# Patient Record
Sex: FEMALE | Race: WHITE | NOT HISPANIC OR LATINO | Employment: OTHER | URBAN - METROPOLITAN AREA
[De-identification: names, ages, dates, MRNs, and addresses within clinical notes are randomized per-mention and may not be internally consistent; named-entity substitution may affect disease eponyms.]

---

## 2020-09-10 PROCEDURE — U0003 INFECTIOUS AGENT DETECTION BY NUCLEIC ACID (DNA OR RNA); SEVERE ACUTE RESPIRATORY SYNDROME CORONAVIRUS 2 (SARS-COV-2) (CORONAVIRUS DISEASE [COVID-19]), AMPLIFIED PROBE TECHNIQUE, MAKING USE OF HIGH THROUGHPUT TECHNOLOGIES AS DESCRIBED BY CMS-2020-01-R: HCPCS | Performed by: NURSE PRACTITIONER

## 2020-09-19 ENCOUNTER — APPOINTMENT (OUTPATIENT)
Dept: GENERAL RADIOLOGY | Facility: HOSPITAL | Age: 65
End: 2020-09-19

## 2020-09-19 ENCOUNTER — HOSPITAL ENCOUNTER (INPATIENT)
Facility: HOSPITAL | Age: 65
LOS: 5 days | Discharge: HOME | End: 2020-09-24
Attending: INTERNAL MEDICINE | Admitting: INTERNAL MEDICINE

## 2020-09-19 DIAGNOSIS — R50.9 FEVER IN ADULT: ICD-10-CM

## 2020-09-19 DIAGNOSIS — J44.9 CHRONIC OBSTRUCTIVE PULMONARY DISEASE, UNSPECIFIED COPD TYPE (HCC): ICD-10-CM

## 2020-09-19 DIAGNOSIS — J18.9 PNEUMONIA OF BOTH LUNGS DUE TO INFECTIOUS ORGANISM, UNSPECIFIED PART OF LUNG: ICD-10-CM

## 2020-09-19 DIAGNOSIS — R09.02 HYPOXIA: ICD-10-CM

## 2020-09-19 DIAGNOSIS — U07.1 COVID-19 VIRUS INFECTION: Primary | ICD-10-CM

## 2020-09-19 LAB
ALBUMIN SERPL-MCNC: 3.3 G/DL (ref 3.5–5.2)
ALBUMIN/GLOB SERPL: 1.3 G/DL
ALP SERPL-CCNC: 61 U/L (ref 39–117)
ALT SERPL W P-5'-P-CCNC: 45 U/L (ref 1–33)
ANION GAP SERPL CALCULATED.3IONS-SCNC: 11 MMOL/L (ref 5–15)
ARTERIAL PATENCY WRIST A: POSITIVE
AST SERPL-CCNC: 67 U/L (ref 1–32)
ATMOSPHERIC PRESS: ABNORMAL MM[HG]
B PARAPERT DNA SPEC QL NAA+PROBE: NOT DETECTED
B PERT DNA SPEC QL NAA+PROBE: NOT DETECTED
BASE EXCESS BLDA CALC-SCNC: 0.4 MMOL/L (ref 0–3)
BASOPHILS # BLD AUTO: 0 10*3/MM3 (ref 0–0.2)
BASOPHILS NFR BLD AUTO: 0.3 % (ref 0–1.5)
BDY SITE: ABNORMAL
BILIRUB SERPL-MCNC: 0.3 MG/DL (ref 0–1.2)
BUN SERPL-MCNC: 7 MG/DL (ref 8–23)
BUN SERPL-MCNC: ABNORMAL MG/DL
BUN/CREAT SERPL: ABNORMAL
C PNEUM DNA NPH QL NAA+NON-PROBE: NOT DETECTED
CALCIUM SPEC-SCNC: 8 MG/DL (ref 8.6–10.5)
CHLORIDE SERPL-SCNC: 97 MMOL/L (ref 98–107)
CO2 BLDA-SCNC: 24.2 MMOL/L (ref 22–29)
CO2 SERPL-SCNC: 25 MMOL/L (ref 22–29)
CREAT SERPL-MCNC: 0.57 MG/DL (ref 0.57–1)
CRP SERPL-MCNC: 11.34 MG/DL (ref 0–0.5)
D-LACTATE SERPL-SCNC: 1 MMOL/L (ref 0.5–2)
DEPRECATED RDW RBC AUTO: 45.1 FL (ref 37–54)
EOSINOPHIL # BLD AUTO: 0 10*3/MM3 (ref 0–0.4)
EOSINOPHIL NFR BLD AUTO: 0 % (ref 0.3–6.2)
ERYTHROCYTE [DISTWIDTH] IN BLOOD BY AUTOMATED COUNT: 14 % (ref 12.3–15.4)
FLUAV H1 2009 PAND RNA NPH QL NAA+PROBE: NOT DETECTED
FLUAV H1 HA GENE NPH QL NAA+PROBE: NOT DETECTED
FLUAV H3 RNA NPH QL NAA+PROBE: NOT DETECTED
FLUAV SUBTYP SPEC NAA+PROBE: NOT DETECTED
FLUBV RNA ISLT QL NAA+PROBE: NOT DETECTED
GFR SERPL CREATININE-BSD FRML MDRD: 107 ML/MIN/1.73
GLOBULIN UR ELPH-MCNC: 2.6 GM/DL
GLUCOSE BLDC GLUCOMTR-MCNC: 143 MG/DL (ref 70–105)
GLUCOSE BLDC GLUCOMTR-MCNC: 148 MG/DL (ref 70–105)
GLUCOSE SERPL-MCNC: 118 MG/DL (ref 65–99)
HADV DNA SPEC NAA+PROBE: NOT DETECTED
HCO3 BLDA-SCNC: 23.2 MMOL/L (ref 21–28)
HCOV 229E RNA SPEC QL NAA+PROBE: NOT DETECTED
HCOV HKU1 RNA SPEC QL NAA+PROBE: NOT DETECTED
HCOV NL63 RNA SPEC QL NAA+PROBE: NOT DETECTED
HCOV OC43 RNA SPEC QL NAA+PROBE: NOT DETECTED
HCT VFR BLD AUTO: 43.2 % (ref 34–46.6)
HEMODILUTION: NO
HGB BLD-MCNC: 14.7 G/DL (ref 12–15.9)
HMPV RNA NPH QL NAA+NON-PROBE: NOT DETECTED
HPIV1 RNA SPEC QL NAA+PROBE: NOT DETECTED
HPIV2 RNA SPEC QL NAA+PROBE: NOT DETECTED
HPIV3 RNA NPH QL NAA+PROBE: NOT DETECTED
HPIV4 P GENE NPH QL NAA+PROBE: NOT DETECTED
INHALED O2 CONCENTRATION: 40 %
LDH SERPL-CCNC: 434 U/L (ref 135–214)
LYMPHOCYTES # BLD AUTO: 0.6 10*3/MM3 (ref 0.7–3.1)
LYMPHOCYTES NFR BLD AUTO: 10.2 % (ref 19.6–45.3)
M PNEUMO IGG SER IA-ACNC: NOT DETECTED
MCH RBC QN AUTO: 31.5 PG (ref 26.6–33)
MCHC RBC AUTO-ENTMCNC: 34 G/DL (ref 31.5–35.7)
MCV RBC AUTO: 92.9 FL (ref 79–97)
MODALITY: ABNORMAL
MONOCYTES # BLD AUTO: 0.3 10*3/MM3 (ref 0.1–0.9)
MONOCYTES NFR BLD AUTO: 5.2 % (ref 5–12)
NEUTROPHILS NFR BLD AUTO: 5.2 10*3/MM3 (ref 1.7–7)
NEUTROPHILS NFR BLD AUTO: 84.3 % (ref 42.7–76)
NRBC BLD AUTO-RTO: 0 /100 WBC (ref 0–0.2)
PCO2 BLDA: 31.8 MM HG (ref 35–48)
PH BLDA: 7.47 PH UNITS (ref 7.35–7.45)
PLATELET # BLD AUTO: 204 10*3/MM3 (ref 140–450)
PMV BLD AUTO: 8.4 FL (ref 6–12)
PO2 BLDA: 76.9 MM HG (ref 83–108)
POTASSIUM SERPL-SCNC: 3.6 MMOL/L (ref 3.5–5.2)
PROCALCITONIN SERPL-MCNC: 0.55 NG/ML (ref 0–0.25)
PROT SERPL-MCNC: 5.9 G/DL (ref 6–8.5)
RBC # BLD AUTO: 4.65 10*6/MM3 (ref 3.77–5.28)
RHINOVIRUS RNA SPEC NAA+PROBE: NOT DETECTED
RSV RNA NPH QL NAA+NON-PROBE: NOT DETECTED
SAO2 % BLDCOA: 96.2 % (ref 94–98)
SODIUM SERPL-SCNC: 133 MMOL/L (ref 136–145)
TROPONIN T SERPL-MCNC: <0.01 NG/ML (ref 0–0.03)
WBC # BLD AUTO: 6.2 10*3/MM3 (ref 3.4–10.8)

## 2020-09-19 PROCEDURE — 94640 AIRWAY INHALATION TREATMENT: CPT

## 2020-09-19 PROCEDURE — 63710000001 ONDANSETRON ODT 4 MG TABLET DISPERSIBLE: Performed by: NURSE PRACTITIONER

## 2020-09-19 PROCEDURE — 25010000002 CEFTRIAXONE PER 250 MG: Performed by: NURSE PRACTITIONER

## 2020-09-19 PROCEDURE — 83605 ASSAY OF LACTIC ACID: CPT

## 2020-09-19 PROCEDURE — 25010000002 ENOXAPARIN PER 10 MG: Performed by: INTERNAL MEDICINE

## 2020-09-19 PROCEDURE — 87040 BLOOD CULTURE FOR BACTERIA: CPT | Performed by: NURSE PRACTITIONER

## 2020-09-19 PROCEDURE — 82803 BLOOD GASES ANY COMBINATION: CPT

## 2020-09-19 PROCEDURE — 83615 LACTATE (LD) (LDH) ENZYME: CPT | Performed by: NURSE PRACTITIONER

## 2020-09-19 PROCEDURE — 82962 GLUCOSE BLOOD TEST: CPT

## 2020-09-19 PROCEDURE — 94799 UNLISTED PULMONARY SVC/PX: CPT

## 2020-09-19 PROCEDURE — 84145 PROCALCITONIN (PCT): CPT | Performed by: NURSE PRACTITIONER

## 2020-09-19 PROCEDURE — 25010000002 DEXAMETHASONE SODIUM PHOSPHATE 10 MG/ML SOLUTION: Performed by: NURSE PRACTITIONER

## 2020-09-19 PROCEDURE — 99285 EMERGENCY DEPT VISIT HI MDM: CPT

## 2020-09-19 PROCEDURE — 71045 X-RAY EXAM CHEST 1 VIEW: CPT

## 2020-09-19 PROCEDURE — 86140 C-REACTIVE PROTEIN: CPT | Performed by: NURSE PRACTITIONER

## 2020-09-19 PROCEDURE — 84484 ASSAY OF TROPONIN QUANT: CPT | Performed by: NURSE PRACTITIONER

## 2020-09-19 PROCEDURE — 36600 WITHDRAWAL OF ARTERIAL BLOOD: CPT

## 2020-09-19 PROCEDURE — 0100U HC BIOFIRE FILMARRAY RESP PANEL 2: CPT | Performed by: INTERNAL MEDICINE

## 2020-09-19 PROCEDURE — 99223 1ST HOSP IP/OBS HIGH 75: CPT | Performed by: INTERNAL MEDICINE

## 2020-09-19 PROCEDURE — 85025 COMPLETE CBC W/AUTO DIFF WBC: CPT | Performed by: NURSE PRACTITIONER

## 2020-09-19 PROCEDURE — XW033E5 INTRODUCTION OF REMDESIVIR ANTI-INFECTIVE INTO PERIPHERAL VEIN, PERCUTANEOUS APPROACH, NEW TECHNOLOGY GROUP 5: ICD-10-PCS | Performed by: INTERNAL MEDICINE

## 2020-09-19 PROCEDURE — 93005 ELECTROCARDIOGRAM TRACING: CPT | Performed by: NURSE PRACTITIONER

## 2020-09-19 PROCEDURE — 25010000002 AZITHROMYCIN PER 500 MG: Performed by: NURSE PRACTITIONER

## 2020-09-19 PROCEDURE — 80053 COMPREHEN METABOLIC PANEL: CPT | Performed by: NURSE PRACTITIONER

## 2020-09-19 RX ORDER — NITROGLYCERIN 0.4 MG/1
0.4 TABLET SUBLINGUAL
Status: DISCONTINUED | OUTPATIENT
Start: 2020-09-19 | End: 2020-09-24 | Stop reason: HOSPADM

## 2020-09-19 RX ORDER — ACETAMINOPHEN 500 MG
1000 TABLET ORAL ONCE
Status: COMPLETED | OUTPATIENT
Start: 2020-09-19 | End: 2020-09-19

## 2020-09-19 RX ORDER — ONDANSETRON 2 MG/ML
4 INJECTION INTRAMUSCULAR; INTRAVENOUS EVERY 6 HOURS PRN
Status: DISCONTINUED | OUTPATIENT
Start: 2020-09-19 | End: 2020-09-24 | Stop reason: HOSPADM

## 2020-09-19 RX ORDER — ONDANSETRON 4 MG/1
4 TABLET, FILM COATED ORAL EVERY 6 HOURS PRN
Status: DISCONTINUED | OUTPATIENT
Start: 2020-09-19 | End: 2020-09-24 | Stop reason: HOSPADM

## 2020-09-19 RX ORDER — DEXAMETHASONE SODIUM PHOSPHATE 10 MG/ML
6 INJECTION, SOLUTION INTRAMUSCULAR; INTRAVENOUS ONCE
Status: COMPLETED | OUTPATIENT
Start: 2020-09-19 | End: 2020-09-19

## 2020-09-19 RX ORDER — ALUMINA, MAGNESIA, AND SIMETHICONE 2400; 2400; 240 MG/30ML; MG/30ML; MG/30ML
15 SUSPENSION ORAL EVERY 6 HOURS PRN
Status: DISCONTINUED | OUTPATIENT
Start: 2020-09-19 | End: 2020-09-24 | Stop reason: HOSPADM

## 2020-09-19 RX ORDER — SODIUM CHLORIDE 0.9 % (FLUSH) 0.9 %
10 SYRINGE (ML) INJECTION AS NEEDED
Status: DISCONTINUED | OUTPATIENT
Start: 2020-09-19 | End: 2020-09-24 | Stop reason: HOSPADM

## 2020-09-19 RX ORDER — CETIRIZINE HYDROCHLORIDE 10 MG/1
10 TABLET ORAL DAILY
Status: DISCONTINUED | OUTPATIENT
Start: 2020-09-19 | End: 2020-09-24 | Stop reason: HOSPADM

## 2020-09-19 RX ORDER — BUDESONIDE AND FORMOTEROL FUMARATE DIHYDRATE 160; 4.5 UG/1; UG/1
2 AEROSOL RESPIRATORY (INHALATION)
Status: DISCONTINUED | OUTPATIENT
Start: 2020-09-19 | End: 2020-09-24 | Stop reason: HOSPADM

## 2020-09-19 RX ORDER — ACETAMINOPHEN 160 MG/5ML
650 SOLUTION ORAL EVERY 4 HOURS PRN
Status: DISCONTINUED | OUTPATIENT
Start: 2020-09-19 | End: 2020-09-24 | Stop reason: HOSPADM

## 2020-09-19 RX ORDER — DEXAMETHASONE 6 MG/1
6 TABLET ORAL
Status: DISCONTINUED | OUTPATIENT
Start: 2020-09-20 | End: 2020-09-21

## 2020-09-19 RX ORDER — SODIUM CHLORIDE 0.9 % (FLUSH) 0.9 %
10 SYRINGE (ML) INJECTION EVERY 12 HOURS SCHEDULED
Status: DISCONTINUED | OUTPATIENT
Start: 2020-09-19 | End: 2020-09-24 | Stop reason: HOSPADM

## 2020-09-19 RX ORDER — AZITHROMYCIN 250 MG/1
250 TABLET, FILM COATED ORAL
Status: COMPLETED | OUTPATIENT
Start: 2020-09-20 | End: 2020-09-23

## 2020-09-19 RX ORDER — ACETAMINOPHEN 650 MG/1
650 SUPPOSITORY RECTAL EVERY 4 HOURS PRN
Status: DISCONTINUED | OUTPATIENT
Start: 2020-09-19 | End: 2020-09-24 | Stop reason: HOSPADM

## 2020-09-19 RX ORDER — ALBUTEROL SULFATE 2.5 MG/3ML
2.5 SOLUTION RESPIRATORY (INHALATION) EVERY 6 HOURS PRN
COMMUNITY

## 2020-09-19 RX ORDER — ONDANSETRON 4 MG/1
4 TABLET, ORALLY DISINTEGRATING ORAL ONCE
Status: COMPLETED | OUTPATIENT
Start: 2020-09-19 | End: 2020-09-19

## 2020-09-19 RX ORDER — ACETAMINOPHEN 325 MG/1
650 TABLET ORAL EVERY 4 HOURS PRN
Status: DISCONTINUED | OUTPATIENT
Start: 2020-09-19 | End: 2020-09-24 | Stop reason: HOSPADM

## 2020-09-19 RX ORDER — CHOLECALCIFEROL (VITAMIN D3) 125 MCG
5 CAPSULE ORAL NIGHTLY PRN
Status: DISCONTINUED | OUTPATIENT
Start: 2020-09-19 | End: 2020-09-24 | Stop reason: HOSPADM

## 2020-09-19 RX ORDER — ALBUTEROL SULFATE 90 UG/1
2 AEROSOL, METERED RESPIRATORY (INHALATION) EVERY 4 HOURS PRN
Status: DISCONTINUED | OUTPATIENT
Start: 2020-09-19 | End: 2020-09-24 | Stop reason: HOSPADM

## 2020-09-19 RX ADMIN — CEFTRIAXONE SODIUM 1 G: 10 INJECTION, POWDER, FOR SOLUTION INTRAVENOUS at 11:33

## 2020-09-19 RX ADMIN — ALBUTEROL SULFATE 2 PUFF: 90 AEROSOL, METERED RESPIRATORY (INHALATION) at 18:36

## 2020-09-19 RX ADMIN — Medication 10 ML: at 14:57

## 2020-09-19 RX ADMIN — ENOXAPARIN SODIUM 40 MG: 40 INJECTION SUBCUTANEOUS at 14:55

## 2020-09-19 RX ADMIN — SODIUM CHLORIDE 500 MG: 9 INJECTION, SOLUTION INTRAVENOUS at 11:33

## 2020-09-19 RX ADMIN — Medication 10 ML: at 20:30

## 2020-09-19 RX ADMIN — BUDESONIDE AND FORMOTEROL FUMARATE DIHYDRATE 2 PUFF: 160; 4.5 AEROSOL RESPIRATORY (INHALATION) at 18:36

## 2020-09-19 RX ADMIN — CETIRIZINE HYDROCHLORIDE 10 MG: 10 TABLET, FILM COATED ORAL at 14:55

## 2020-09-19 RX ADMIN — DEXAMETHASONE SODIUM PHOSPHATE 6 MG: 10 INJECTION, SOLUTION INTRAMUSCULAR; INTRAVENOUS at 09:37

## 2020-09-19 RX ADMIN — ONDANSETRON 4 MG: 4 TABLET, ORALLY DISINTEGRATING ORAL at 11:54

## 2020-09-19 RX ADMIN — SODIUM CHLORIDE 200 MG: 9 INJECTION, SOLUTION INTRAVENOUS at 15:12

## 2020-09-19 RX ADMIN — ACETAMINOPHEN 1000 MG: 500 TABLET ORAL at 09:39

## 2020-09-19 NOTE — ED NOTES
RT called for ABG per NP Andressa request.     Kacy Duarte RN  09/19/20 0941       Kacy Duarte RN  09/19/20 0941

## 2020-09-19 NOTE — ED NOTES
Bodyaches, fever at home. Was diagnosed with covid about 1 week ago. History of COPD. Coughs only after breathing treatments     Cari Kamara RN  09/19/20 1483

## 2020-09-19 NOTE — ED NOTES
Notified staff that patient needs a recollect on their green tube d/t hemolysis     Garima Champion, RegSched Rep  09/19/20 1007

## 2020-09-19 NOTE — ED PROVIDER NOTES
Subjective   Patient is a 65-year-old obese white female with history of COPD who presents today with complaints of fever chills body aches cough congestion shortness of breath.  Patient states she normally only wears oxygen at night however she has been wearing it continuously for the last several days.  Patient states her symptoms started 10 days ago.  She states at the onset of her symptoms she was seen in urgent care and tested for COVID.  She states 6 days ago she received positive results.  She states her  also had COVID and thinks she contracted it from him.  She states in the last 24 to 48 hours her symptoms have worsened.  She denies any chest pain.  She denies any nausea or vomiting but does report a few nonbloody loose stools.  She denies any lower extremity pain or edema.  She denies any recent travel or prolonged immobilization.          Review of Systems   Constitutional: Positive for chills, fatigue and fever.   HENT: Positive for congestion. Negative for sore throat.    Respiratory: Positive for cough and shortness of breath.    Cardiovascular: Negative for chest pain and leg swelling.   Gastrointestinal: Positive for diarrhea. Negative for abdominal pain, nausea and vomiting.   Genitourinary: Negative for decreased urine volume and dysuria.   Musculoskeletal: Positive for myalgias.   Skin: Negative for rash.   Neurological: Positive for weakness and headaches. Negative for dizziness and numbness.       Past Medical History:   Diagnosis Date   • COPD (chronic obstructive pulmonary disease) (CMS/[facility])        Allergies   Allergen Reactions   • Sulfa Antibiotics Anaphylaxis       Past Surgical History:   Procedure Laterality Date   • TONSILLECTOMY     • TUBAL ABDOMINAL LIGATION         No family history on file.    Social History     Socioeconomic History   • Marital status:      Spouse name: Not on file   • Number of children: Not on file   • Years of education: Not on file   • Highest  education level: Not on file   Tobacco Use   • Smoking status: Former Smoker   • Smokeless tobacco: Never Used   • Tobacco comment: quit 2011   Substance and Sexual Activity   • Alcohol use: Never     Frequency: Never   • Drug use: Never           Objective   Physical Exam  Vital signs and triage nurse note reviewed.  Constitutional: Awake, alert; well-developed and well-nourished. No acute distress is noted.  HEENT: Normocephalic, atraumatic; pupils are PERRL with intact EOM; oropharynx is pink and moist without exudate or erythema.  No drooling or pooling of oral secretions.  Neck: Supple, full range of motion without pain; no cervical lymphadenopathy. Normal phonation.  Cardiovascular: Regular rate and rhythm, normal S1-S2.  No murmur noted.  Pulmonary: Respiratory effort regular nonlabored, mildly tachypneic, breath sounds bibasilar crackles.  No wheezes or rhonchi noted.  Abdomen: Soft, nontender, nondistended with normoactive bowel sounds; no rebound or guarding.  Musculoskeletal: Independent range of motion of all extremities with no palpable tenderness or edema.  Calves are symmetric and nontender.  Neuro: Alert oriented x3, speech is clear and appropriate, GCS 15.    Skin: Flesh tone, warm, dry, intact; no erythematous or petechial rash or lesion.      Procedures           ED Course  ED Course as of Sep 19 1101   Sat Sep 19, 2020   1052 EKG reviewed by me and interpreted by Dr. Pollock:  Sinus tachycardia with ventricular rate of 110.  No acute ST or T wave changes noted.     [MD]      ED Course User Index  [MD] Steffany Mcmanus APRN      Labs Reviewed   CBC WITH AUTO DIFFERENTIAL - Abnormal; Notable for the following components:       Result Value    Neutrophil % 84.3 (*)     Lymphocyte % 10.2 (*)     Eosinophil % 0.0 (*)     Lymphocytes, Absolute 0.60 (*)     All other components within normal limits   BLOOD GAS, ARTERIAL - Abnormal; Notable for the following components:    pH, Arterial 7.471 (*)     pCO2,  Arterial 31.8 (*)     pO2, Arterial 76.9 (*)     All other components within normal limits   POC LACTATE - Normal   BLOOD CULTURE   BLOOD CULTURE   BLOOD GAS, ARTERIAL   COMPREHENSIVE METABOLIC PANEL   LACTATE DEHYDROGENASE   PROCALCITONIN   C-REACTIVE PROTEIN   TROPONIN (IN-HOUSE)   BUN   POC LACTATE   CBC AND DIFFERENTIAL    Narrative:     The following orders were created for panel order CBC & Differential.  Procedure                               Abnormality         Status                     ---------                               -----------         ------                     CBC Auto Differential[[id number]]        Abnormal            Final result                 Please view results for these tests on the individual orders.     Xr Chest Ap    Result Date: 9/19/2020  Multifocal consolidation involving the right midlung and lung bases bilaterally consistent with multifocal pneumonia, worsened from 09/10/2020.  Electronically Signed By-Ramiro Villa On:9/19/2020 10:30 AM This report was finalized on [id number] by  Ramiro Villa, .    Medications   sodium chloride 0.9 % flush 10 mL (has no administration in time range)   cefTRIAXone (ROCEPHIN) in SWFI 1 gram/10ml IV PUSH syringe (has no administration in time range)   azithromycin (ZITHROMAX) 500 mg in 250mL NS IVPB (has no administration in time range)   dexamethasone sodium phosphate injection 6 mg (6 mg Intravenous Given 9/19/20 0937)   acetaminophen (TYLENOL) tablet 1,000 mg (1,000 mg Oral Given 9/19/20 0939)                                            MDM  Number of Diagnoses or Management Options  COVID-19 virus infection:   Fever in adult:   Hypoxia:   Diagnosis management comments: Comorbidities: COPD  Differentials: Pneumonia, hypoxia, sepsis, COPD exacerbation;this list is not all inclusive and does not constitute the entirety of considered causes  Discussion with provider:  Radiology interpretation: X-rays reviewed by me and interpreted by radiologist:  As above  Lab interpretation: Labs viewed by me significant for: As above    Patient is pleasant continue cardiac monitor.  She had IV established.  She had labs, EKG and chest x-ray obtained.  Her initial room air O2 saturation was noted to be 85 to 86%.  She was placed on nasal cannula at 2 L with minimal improvement.  It was increased to 5 L.  Her O2 saturation improved to 92 to 93% on this.  She was given Decadron IV.  She had blood cultures obtained as well as POC lactates be normal at 1.0.  She was given Tylenol for fever.  She had ABG obtained was found to be as follows: pH 7.47, PCO2 31.8, PaO2 76.9, bicarb 23.2.  This was after the patient had been on 5 L nasal cannula for approximately 30 minutes.    Chest x-ray reveals multifocal pneumonia.  Patient started on IV Rocephin and Zithromax.    She will be admitted to hospital for further observation and treatment.  She was discussed with hospitalist.    Appropriate PPE was worn throughout patient exam/encounter.       Amount and/or Complexity of Data Reviewed  Clinical lab tests: ordered and reviewed  Tests in the radiology section of CPT®: ordered and reviewed    Patient Progress  Patient progress: stable      Final diagnoses:   COVID-19 virus infection   Hypoxia   Fever in adult   Pneumonia of both lungs due to infectious organism, unspecified part of lung            Steffany Mcmanus APRN  09/19/20 1057       Steffany Mcmanus APRN  09/19/20 1101

## 2020-09-19 NOTE — NURSING NOTE
Received patient from ED around 13:00 today.  Presents with SOB on 5 liters non-rebreather.  Was febrile in Ed, but upon admission was 97.5.  No c/o of pain or distress.  Pt received Remdesivir at 15:00 through her 20G RAC.  Pt is A&O x4 and a stand by assist.  Pt ate 25% of her dinner and stated that she is feeling much better than she did when she came in.

## 2020-09-19 NOTE — H&P
[facility] Medicine Services      Patient Name: Monisha Batista  : 1955  MRN: [id number]  Primary Care Physician: Polly Domingo MD  Date of admission: 2020    Patient Care Team:  Polly Domingo MD as PCP - General (Family Medicine)  Bess Serrato MD as PCP - Claims Attributed  Bess Serrato MD as Consulting Physician (Pulmonary Disease)          Subjective   History Present Illness     Chief Complaint:   Chief Complaint   Patient presents with   • Fever       Shortness of breath         History of Present Illness    Patient is a 65-year-old female with history of COPD on home oxygen 2.5 L at bedtime.  Presented to the emergency room on 2020 with complaints of cough productive of brownish sputum, progressively worsening shortness of breath and body aches.  Patient stated symptoms started about 10 days ago  and she had gone to an outlying urgent care facility where she was diagnosed with COVID-19 on 2020.  Since then her symptoms continue to worsen and she now has to use her home oxygen continuously.  Related having subjective fever and chills  Of note is that her  is also positive for COVID-19.  In the ED, temperature was 101.6 and patient 's oxygen saturation was 86% on room air improved to 93% with 5 L of oxygen via nasal cannula.  Chest x-ray done showed worsening multifocal pneumonia.    Patient was admitted for further care.    Review of Systems   Constitution: Positive for chills, fever and malaise/fatigue.   HENT: Positive for congestion.    Eyes: Negative for blurred vision.   Cardiovascular: Negative for chest pain.   Respiratory: Positive for cough and shortness of breath. Negative for hemoptysis.    Endocrine: Negative for cold intolerance and heat intolerance.   Gastrointestinal: Positive for diarrhea and nausea. Negative for abdominal pain and vomiting.   Genitourinary: Negative for dysuria.   Neurological: Positive for weakness.      Psychiatric/Behavioral: Negative for altered mental status.           Personal History     Past Medical History:   Past Medical History:   Diagnosis Date   • COPD (chronic obstructive pulmonary disease) (CMS/HCC)    • Sleep apnea        Surgical History:      Past Surgical History:   Procedure Laterality Date   • TONSILLECTOMY     • TUBAL ABDOMINAL LIGATION             Family History: family history is not on file. Otherwise pertinent FHx was reviewed and unremarkable.     Social History:  reports that she has quit smoking. She has never used smokeless tobacco. She reports that she does not drink alcohol or use drugs.      Medications:  Prior to Admission medications    Medication Sig Start Date End Date Taking? Authorizing Provider   albuterol (PROVENTIL) (2.5 MG/3ML) 0.083% nebulizer solution Take 2.5 mg by nebulization Every 6 (Six) Hours As Needed for Wheezing or Shortness of Air.   Yes Provider, MD Angel   albuterol sulfate  (90 Base) MCG/ACT inhaler Inhale 2 puffs Every 4 (Four) Hours As Needed. 7/7/20  Yes Emergency, Nurse Harris RN   loratadine (CLARITIN) 10 MG tablet Take 10 mg by mouth every night at bedtime. 7/6/20  Yes Emergency, Nurse Epic, RN   TRELEGY ELLIPTA 100-62.5-25 MCG/INH aerosol powder  Inhale 1 puff Daily. 8/12/20  Yes Emergency, Nurse Epic, RN   brompheniramine-pseudoephedrine-DM 30-2-10 MG/5ML syrup Take 5 mL by mouth 4 (Four) Times a Day As Needed for Congestion or Cough. 9/10/20   Taj Belcher APRN   doxycycline (VIBRAMYCIN) 100 MG capsule Take 1 capsule by mouth 2 (Two) Times a Day. 9/10/20   Taj Belcher APRN   albuterol sulfate HFA (ProAir HFA) 108 (90 Base) MCG/ACT inhaler PROAIR  (90 Base) MCG/ACT AERS 2/18/16 9/19/20  Emergency, Nurse Epic, RN       Allergies:    Allergies   Allergen Reactions   • Sulfa Antibiotics Anaphylaxis       Objective   Objective     Vital Signs  Temp:  [97.8 °F (36.6 °C)-101.6 °F (38.7 °C)] 97.8 °F (36.6 °C)  Heart Rate:   [101-119] 101  Resp:  [16-22] 22  BP: (101-146)/(68-85) 123/74  SpO2:  [86 %-95 %] 92 %  on  Flow (L/min):  [5] 5;   Device (Oxygen Therapy): nonrebreather mask  Body mass index is 38.03 kg/m².    Physical Exam  Vitals signs reviewed.   Constitutional:       General: She is not in acute distress.  HENT:      Head: Normocephalic and atraumatic.      Nose: Nose normal.   Eyes:      Extraocular Movements: Extraocular movements intact.      Pupils: Pupils are equal, round, and reactive to light.   Neck:      Musculoskeletal: Neck supple.   Cardiovascular:      Rate and Rhythm: Normal rate and regular rhythm.   Pulmonary:      Effort: No respiratory distress.      Comments: Decreased air entry at the bases  Abdominal:      General: Bowel sounds are normal.      Palpations: Abdomen is soft.      Tenderness: There is no abdominal tenderness.   Musculoskeletal: Normal range of motion.   Skin:     General: Skin is warm and dry.   Neurological:      General: No focal deficit present.      Mental Status: She is alert and oriented to person, place, and time.   Psychiatric:         Mood and Affect: Mood normal.         Results Review:  I have personally reviewed most recent blood work and imaging     Results from last 7 days   Lab Units 09/19/20  0937   WBC 10*3/mm3 6.20   HEMOGLOBIN g/dL 14.7   HEMATOCRIT % 43.2   PLATELETS 10*3/mm3 204     Results from last 7 days   Lab Units 09/19/20  1009 09/19/20  0935   SODIUM mmol/L 133*  --    POTASSIUM mmol/L 3.6  --    CHLORIDE mmol/L 97*  --    CO2 mmol/L 25.0  --    BUN  7*  --    CREATININE mg/dL 0.57  --    GLUCOSE mg/dL 118*  --    CALCIUM mg/dL 8.0*  --    ALT (SGPT) U/L 45*  --    AST (SGOT) U/L 67*  --    TROPONIN T ng/mL <0.010  --    LACTATE mmol/L  --  1.0   PROCALCITONIN ng/mL 0.55*  --      Estimated Creatinine Clearance: 115.2 mL/min (by C-G formula based on SCr of 0.57 mg/dL).  Brief Urine Lab Results     None          Microbiology Results (last 10 days)     Procedure  Component Value - Date/Time    COVID-19,[facility],NP/OP Swab in Transport Media or ESwab 72 HR TAT - Swab, Nasopharynx [[id number]]  (Abnormal) Collected: 09/10/20 1313    Lab Status: Final result Specimen: Swab from Nasopharynx Updated: 09/12/20 2207    Narrative:      The following orders were created for panel order COVID-19,[facility],NP/OP Swab in Transport Media or ESwab 72 HR TAT - Swab, Nasopharynx.  Procedure                               Abnormality         Status                     ---------                               -----------         ------                     COVID-19,[facility] ROUTINE...[[id number]]  Abnormal            Final result                 Please view results for these tests on the individual orders.    COVID-19,[facility] ROUTINE, NP/OP SWAB IN TRANSPORT MEDIA OR ESWAB 72 HR TAT - Swab, Nasopharynx [[id number]]  (Abnormal) Collected: 09/10/20 1313    Lab Status: Final result Specimen: Swab from Nasopharynx Updated: 09/12/20 2207     SARS-CoV-2, [facility]ILYN Detected     Comment: This nucleic acid amplification test was developed and its performance  characteristics determined by [facility]. Nucleic acid  amplification tests include PCR and TMA. This test has not been FDA  cleared or approved. This test has been authorized by FDA under an  Emergency Use Authorization (EUA). This test is only authorized for  the duration of time the declaration that circumstances exist  justifying the authorization of the emergency use of in vitro  diagnostic tests for detection of SARS-CoV-2 virus and/or diagnosis  of COVID-19 infection under section 564(b)(1) of the Act, 21 U.S.C.  360bbb-3(b) (1), unless the authorization is terminated or revoked  sooner.  When diagnostic testing is negative, the possibility of a false  negative result should be considered in the context of a patient's  recent exposures and the presence of clinical signs and symptoms  consistent with COVID-19. An individual without symptoms of  COVID-19  and who is not shedding SARS-CoV-2 virus would expect to have a  negative (not detected) result in this assay.       Narrative:      Performed at:  [address] [facility]  [address] [facility], [place], [place]  [id number]  : Eliza Rose MD, Phone:  [phone]          ECG/EMG Results (most recent)     Procedure Component Value Units Date/Time    ECG 12 Lead [[id number]] Collected: 09/19/20 0944     Updated: 09/19/20 0946    Narrative:      HEART RATE= 110  bpm  RR Interval= 544  ms  NC Interval= 158  ms  P Horizontal Axis= 0  deg  P Front Axis= 61  deg  QRSD Interval= 84  ms  QT Interval= 319  ms  QRS Axis= 5  deg  T Wave Axis= 62  deg  - OTHERWISE NORMAL ECG -  Sinus tachycardia  No previous ECG available for comparison  Electronically Signed By:   Date and Time of Study: 2020-09-19 09:44:09                    Xr Chest Ap    Result Date: 9/19/2020  Multifocal consolidation involving the right midlung and lung bases bilaterally consistent with multifocal pneumonia, worsened from 09/10/2020.  Electronically Signed By-Ramiro Villa On:9/19/2020 10:30 AM This report was finalized on [id number] by  Ramiro Villa, .        Estimated Creatinine Clearance: 115.2 mL/min (by C-G formula based on SCr of 0.57 mg/dL).    Assessment/Plan   Assessment/Plan       Active Hospital Problems    Diagnosis  POA   • COVID-19 virus infection [U07.1]  Yes      Resolved Hospital Problems   No resolved problems to display.     Multifocal pneumonia due to COVID-19 infection  Tested positive for COVID-19 on 09/10/2020  Will  check respiratory viral panel  Chest x-ray reviewed-showed worsening of the focal pneumonia  Will start on Decadron and remdesivir   Also started on azithromycin  Respiratory care with bronchodilator  Oxygen therapy and titration  Airborne and contact precaution initiated  Monitor inflammatory markers    Fever-most likely due to above  Analgesic as needed    Acute hypoxic respiratory failure on  chronic respiratory failure  On oxygen via nasal cannula-currently on 5 L  Oxygen therapy and titration      DVT prophylaxis with Lovenox        Further recommendation following clinical course    The FDA has authorized the emergency use of remdesivir, which is not an FDA approved drug. Discussions with the patient/patient caregiver regarding the risks and benefits of remdesivir have occurred. The patient/patient caregiver verbalizes recognition that this is an investigational medication which may offer both benefits and risks, the extent of which are unknown. Information on available alternative treatments and the risks and benefits of those alternatives was discussed. “Fact Sheet for Patients and Parents/Caregivers” was discussed with the patient/patient caregiver. All questions were answered to satisfaction and the patient/patient caregiver would like to proceed treating with remdesivir.                Mechanical Order History:     None      Pharmalogical Order History:     None          CODE STATUS:    Code Status and Medical Interventions:   Ordered at: 09/19/20 1358     Level Of Support Discussed With:    Patient     Code Status:    CPR     Medical Interventions (Level of Support Prior to Arrest):    Full       This patient has been examined with appropriate PPE. 09/19/20      I discussed the patient's findings and my recommendations with patient's    Electronically signed by Alex Leonard MD, 09/19/20, 1:27 PM EDT.  [facility] Hospitalist Team

## 2020-09-20 LAB
ALBUMIN SERPL-MCNC: 3.1 G/DL (ref 3.5–5.2)
ALBUMIN/GLOB SERPL: 1.1 G/DL
ALP SERPL-CCNC: 64 U/L (ref 39–117)
ALT SERPL W P-5'-P-CCNC: 45 U/L (ref 1–33)
ANION GAP SERPL CALCULATED.3IONS-SCNC: 10 MMOL/L (ref 5–15)
AST SERPL-CCNC: 63 U/L (ref 1–32)
BASOPHILS # BLD AUTO: 0 10*3/MM3 (ref 0–0.2)
BASOPHILS NFR BLD AUTO: 0.2 % (ref 0–1.5)
BILIRUB SERPL-MCNC: 0.2 MG/DL (ref 0–1.2)
BUN SERPL-MCNC: 10 MG/DL (ref 8–23)
BUN SERPL-MCNC: ABNORMAL MG/DL
BUN/CREAT SERPL: ABNORMAL
CALCIUM SPEC-SCNC: 7.9 MG/DL (ref 8.6–10.5)
CHLORIDE SERPL-SCNC: 102 MMOL/L (ref 98–107)
CO2 SERPL-SCNC: 26 MMOL/L (ref 22–29)
CREAT SERPL-MCNC: 0.53 MG/DL (ref 0.57–1)
D DIMER PPP FEU-MCNC: 0.53 MG/L (FEU) (ref 0–0.59)
DEPRECATED RDW RBC AUTO: 45.1 FL (ref 37–54)
EOSINOPHIL # BLD AUTO: 0 10*3/MM3 (ref 0–0.4)
EOSINOPHIL NFR BLD AUTO: 0 % (ref 0.3–6.2)
ERYTHROCYTE [DISTWIDTH] IN BLOOD BY AUTOMATED COUNT: 13.7 % (ref 12.3–15.4)
FERRITIN SERPL-MCNC: 1797 NG/ML (ref 13–150)
GFR SERPL CREATININE-BSD FRML MDRD: 116 ML/MIN/1.73
GLOBULIN UR ELPH-MCNC: 2.7 GM/DL
GLUCOSE BLDC GLUCOMTR-MCNC: 120 MG/DL (ref 70–105)
GLUCOSE BLDC GLUCOMTR-MCNC: 146 MG/DL (ref 70–105)
GLUCOSE BLDC GLUCOMTR-MCNC: 170 MG/DL (ref 70–105)
GLUCOSE BLDC GLUCOMTR-MCNC: 173 MG/DL (ref 70–105)
GLUCOSE SERPL-MCNC: 129 MG/DL (ref 65–99)
HCT VFR BLD AUTO: 41.2 % (ref 34–46.6)
HGB BLD-MCNC: 14 G/DL (ref 12–15.9)
LYMPHOCYTES # BLD AUTO: 0.7 10*3/MM3 (ref 0.7–3.1)
LYMPHOCYTES NFR BLD AUTO: 18.6 % (ref 19.6–45.3)
MCH RBC QN AUTO: 31.5 PG (ref 26.6–33)
MCHC RBC AUTO-ENTMCNC: 33.9 G/DL (ref 31.5–35.7)
MCV RBC AUTO: 93 FL (ref 79–97)
MONOCYTES # BLD AUTO: 0.4 10*3/MM3 (ref 0.1–0.9)
MONOCYTES NFR BLD AUTO: 10.8 % (ref 5–12)
NEUTROPHILS NFR BLD AUTO: 2.5 10*3/MM3 (ref 1.7–7)
NEUTROPHILS NFR BLD AUTO: 70.4 % (ref 42.7–76)
NRBC BLD AUTO-RTO: 0.1 /100 WBC (ref 0–0.2)
PLATELET # BLD AUTO: 207 10*3/MM3 (ref 140–450)
PMV BLD AUTO: 8 FL (ref 6–12)
POTASSIUM SERPL-SCNC: 3.9 MMOL/L (ref 3.5–5.2)
PROT SERPL-MCNC: 5.8 G/DL (ref 6–8.5)
RBC # BLD AUTO: 4.43 10*6/MM3 (ref 3.77–5.28)
SODIUM SERPL-SCNC: 138 MMOL/L (ref 136–145)
WBC # BLD AUTO: 3.5 10*3/MM3 (ref 3.4–10.8)

## 2020-09-20 PROCEDURE — 94799 UNLISTED PULMONARY SVC/PX: CPT

## 2020-09-20 PROCEDURE — 80053 COMPREHEN METABOLIC PANEL: CPT | Performed by: INTERNAL MEDICINE

## 2020-09-20 PROCEDURE — 82962 GLUCOSE BLOOD TEST: CPT

## 2020-09-20 PROCEDURE — 99233 SBSQ HOSP IP/OBS HIGH 50: CPT | Performed by: INTERNAL MEDICINE

## 2020-09-20 PROCEDURE — 82728 ASSAY OF FERRITIN: CPT | Performed by: INTERNAL MEDICINE

## 2020-09-20 PROCEDURE — 85025 COMPLETE CBC W/AUTO DIFF WBC: CPT | Performed by: INTERNAL MEDICINE

## 2020-09-20 PROCEDURE — 85379 FIBRIN DEGRADATION QUANT: CPT | Performed by: INTERNAL MEDICINE

## 2020-09-20 PROCEDURE — 25010000002 ENOXAPARIN PER 10 MG: Performed by: INTERNAL MEDICINE

## 2020-09-20 RX ADMIN — SODIUM CHLORIDE 100 MG: 9 INJECTION, SOLUTION INTRAVENOUS at 15:11

## 2020-09-20 RX ADMIN — ENOXAPARIN SODIUM 40 MG: 40 INJECTION SUBCUTANEOUS at 15:11

## 2020-09-20 RX ADMIN — BUDESONIDE AND FORMOTEROL FUMARATE DIHYDRATE 2 PUFF: 160; 4.5 AEROSOL RESPIRATORY (INHALATION) at 07:52

## 2020-09-20 RX ADMIN — BUDESONIDE AND FORMOTEROL FUMARATE DIHYDRATE 2 PUFF: 160; 4.5 AEROSOL RESPIRATORY (INHALATION) at 19:10

## 2020-09-20 RX ADMIN — Medication 10 ML: at 20:00

## 2020-09-20 RX ADMIN — CETIRIZINE HYDROCHLORIDE 10 MG: 10 TABLET, FILM COATED ORAL at 08:00

## 2020-09-20 RX ADMIN — DEXAMETHASONE 6 MG: 6 TABLET ORAL at 08:00

## 2020-09-20 RX ADMIN — AZITHROMYCIN MONOHYDRATE 250 MG: 250 TABLET ORAL at 08:01

## 2020-09-20 RX ADMIN — Medication 10 ML: at 08:00

## 2020-09-20 NOTE — NURSING NOTE
Patient in bed resting. Non- rebreather on. Patient denies pain. States she experienced one episode of diarrhea today(1900). Per patient one BM today. VS WNL. Patient denies nausea. Productive cough noted. Call light within reach. Bedside table with reach. Educated on use of call light for assistance. Bedside commode noted. Will continue to monitor.

## 2020-09-20 NOTE — PROGRESS NOTES
[facility] Medicine Services Daily Progress Note      Hospitalist Team  LOS 1 days      Patient Care Team:  Polly Domingo MD as PCP - General (Family Medicine)  Bess Serrato MD as PCP - Claims Attributed  Bess Serrato MD as Consulting Physician (Pulmonary Disease)    Patient Location: 201/1      Subjective   Subjective     Chief Complaint / Subjective  Chief Complaint   Patient presents with   • Fever         Brief Synopsis of Hospital Course/HPI  Patient is a 65-year-old female with history of COPD on home oxygen 2.5 L at bedtime.  Presented to the emergency room on 9/20/2020 with complaints of cough productive of brownish sputum, progressively worsening shortness of breath and body aches.  Patient stated symptoms started about 10 days ago  and she had gone to an outlying urgent care facility where she was diagnosed with COVID-19 on 9/11/2020.  Since then her symptoms continue to worsen and she now has to use her home oxygen continuously.  Related having subjective fever and chills  Of note is that her  is also positive for COVID-19.  In the ED, temperature was 101.6 and patient 's oxygen saturation was 86% on room air improved to 93% with 5 L of oxygen via nasal cannula.  Chest x-ray done showed worsening multifocal pneumonia.     Patient was admitted for further care.      Date::    9/20/2020  Patient seen and examined  Shortness of breath continues  Currently on nonrebreather mask 11 L and saturating mid 90s    Review of Systems   Constitution: Negative for chills and fever.   HENT: Negative for congestion.    Eyes: Negative for blurred vision.   Cardiovascular: Negative for chest pain.   Respiratory: Positive for cough and shortness of breath.    Endocrine: Negative for cold intolerance and heat intolerance.   Gastrointestinal: Negative for abdominal pain, nausea and vomiting.   Genitourinary: Negative for dysuria.   Neurological: Positive for weakness.    "  Psychiatric/Behavioral: Negative for altered mental status.         Objective   Objective      Vital Signs  Temp:  [96 °F (35.6 °C)-101.6 °F (38.7 °C)] 97.3 °F (36.3 °C)  Heart Rate:  [] 88  Resp:  [17-25] 20  BP: (101-146)/(64-81) 118/66  Oxygen Therapy  SpO2: 93 %  Pulse Oximetry Type: Continuous  Device (Oxygen Therapy): nonrebreather mask  Flow (L/min): 11  Flowsheet Rows      First Filed Value   Admission Height  162.6 cm (64\") Documented at 09/19/2020 0912   Admission Weight  101 kg (221 lb 9 oz) Documented at 09/19/2020 0912        Intake & Output (last 3 days)       09/17 0701 - 09/18 0700 09/18 0701 - 09/19 0700 09/19 0701 - 09/20 0700 09/20 0701 - 09/21 0700    P.O.   120 240    Total Intake(mL/kg)   120 (1.2) 240 (2.4)    Net   +120 +240            Urine Unmeasured Occurrence    2 x    Stool Unmeasured Occurrence   1 x         Lines, Drains & Airways    Active LDAs     Name:   Placement date:   Placement time:   Site:   Days:    Peripheral IV 09/19/20 0939 Right Antecubital   09/19/20    0939    Antecubital   1                  Physical Exam:    Physical Exam  Vitals signs reviewed.   Constitutional:       Appearance: She is ill-appearing.   HENT:      Head: Normocephalic and atraumatic.      Nose: Nose normal.   Eyes:      Extraocular Movements: Extraocular movements intact.      Conjunctiva/sclera: Conjunctivae normal.      Pupils: Pupils are equal, round, and reactive to light.   Neck:      Musculoskeletal: Neck supple.   Cardiovascular:      Rate and Rhythm: Normal rate and regular rhythm.   Pulmonary:      Effort: Pulmonary effort is normal.      Comments: Decreased air entry at the bases  Abdominal:      General: Bowel sounds are normal.      Palpations: Abdomen is soft.      Tenderness: There is no abdominal tenderness.   Musculoskeletal: Normal range of motion.   Skin:     General: Skin is warm and dry.   Neurological:      Mental Status: She is alert and oriented to person, place, and " time.   Psychiatric:         Mood and Affect: Mood normal.               Procedures:              Results Review:     I reviewed the patient's new clinical results.      Lab Results (last 24 hours)     Procedure Component Value Units Date/Time    Blood Culture - Blood, Arm, Right [[id number]] Collected: 09/19/20 0937    Specimen: Blood from Arm, Right Updated: 09/20/20 0945     Blood Culture No growth at 24 hours    BUN [[id number]]  (Normal) Collected: 09/20/20 0322    Specimen: Blood Updated: 09/20/20 0711     BUN 10 mg/dL     POC Glucose Once [[id number]]  (Abnormal) Collected: 09/20/20 0701    Specimen: Blood Updated: 09/20/20 0702     Glucose 120 mg/dL      Comment: Serial Number: [id number]:  [id number]       Ferritin [[id number]]  (Abnormal) Collected: 09/20/20 0322    Specimen: Blood Updated: 09/20/20 0446     Ferritin 1,797.00 ng/mL     Narrative:      Results may be falsely decreased if patient taking Biotin.      Comprehensive Metabolic Panel [[id number]]  (Abnormal) Collected: 09/20/20 0322    Specimen: Blood Updated: 09/20/20 0442     Glucose 129 mg/dL      BUN --     Comment: Testing performed by alternate method        Creatinine 0.53 mg/dL      Sodium 138 mmol/L      Potassium 3.9 mmol/L      Chloride 102 mmol/L      CO2 26.0 mmol/L      Calcium 7.9 mg/dL      Total Protein 5.8 g/dL      Albumin 3.10 g/dL      ALT (SGPT) 45 U/L      AST (SGOT) 63 U/L      Alkaline Phosphatase 64 U/L      Total Bilirubin 0.2 mg/dL      eGFR Non African Amer 116 mL/min/1.73      Globulin 2.7 gm/dL      A/G Ratio 1.1 g/dL      BUN/Creatinine Ratio --     Comment: Testing not performed        Anion Gap 10.0 mmol/L     Narrative:      GFR Normal >60  Chronic Kidney Disease <60  Kidney Failure <15      D-dimer, Quantitative [[id number]]  (Normal) Collected: 09/20/20 0322    Specimen: Blood Updated: 09/20/20 0424     D-Dimer, Quantitative 0.53 mg/L (FEU)     Narrative:      Reference  Range  --------------------------------------------------------------------     < 0.50   Negative Predictive Value  0.50-0.59   Indeterminate    >= 0.60   Probable VTE             A very low percentage of patients with DVT may yield D-Dimer results   below the cut-off of 0.50 mg/L FEU.  This is known to be more   prevalent in patients with distal DVT.             Results of this test should always be interpreted in conjunction with   the patient's medical history, clinical presentation and other   findings.  Clinical diagnosis should not be based on the result of   INNOVANCE D-Dimer alone.    CBC Auto Differential [[id number]]  (Abnormal) Collected: 09/20/20 0322    Specimen: Blood Updated: 09/20/20 0414     WBC 3.50 10*3/mm3      RBC 4.43 10*6/mm3      Hemoglobin 14.0 g/dL      Hematocrit 41.2 %      MCV 93.0 fL      MCH 31.5 pg      MCHC 33.9 g/dL      RDW 13.7 %      RDW-SD 45.1 fl      MPV 8.0 fL      Platelets 207 10*3/mm3      Neutrophil % 70.4 %      Lymphocyte % 18.6 %      Monocyte % 10.8 %      Eosinophil % 0.0 %      Basophil % 0.2 %      Neutrophils, Absolute 2.50 10*3/mm3      Lymphocytes, Absolute 0.70 10*3/mm3      Monocytes, Absolute 0.40 10*3/mm3      Eosinophils, Absolute 0.00 10*3/mm3      Basophils, Absolute 0.00 10*3/mm3      nRBC 0.1 /100 WBC     POC Glucose Once [[id number]]  (Abnormal) Collected: 09/19/20 2142    Specimen: Blood Updated: 09/19/20 2143     Glucose 148 mg/dL      Comment: Serial Number: [id number]:  [id number]       Respiratory Panel, PCR - Swab, Nasopharynx [[id number]]  (Normal) Collected: 09/19/20 1504    Specimen: Swab from Nasopharynx Updated: 09/19/20 1651     ADENOVIRUS, PCR Not Detected     Coronavirus 229E Not Detected     Coronavirus HKU1 Not Detected     Coronavirus NL63 Not Detected     Coronavirus OC43 Not Detected     Human Metapneumovirus Not Detected     Human Rhinovirus/Enterovirus Not Detected     Influenza B PCR Not Detected     Parainfluenza Virus 1 Not  "Detected     Parainfluenza Virus 2 Not Detected     Parainfluenza Virus 3 Not Detected     Parainfluenza Virus 4 Not Detected     Bordetella pertussis pcr Not Detected     Influenza A H1 2009 PCR Not Detected     Chlamydophila pneumoniae PCR Not Detected     Mycoplasma pneumo by PCR Not Detected     Influenza A PCR Not Detected     Influenza A H3 Not Detected     Influenza A H1 Not Detected     RSV, PCR Not Detected     Bordetella parapertussis PCR Not Detected    Narrative:      The coronavirus on the RVP is NOT COVID-19 and is NOT indicative of infection with COVID-19.     POC Glucose Once [[id number]]  (Abnormal) Collected: 09/19/20 1559    Specimen: Blood Updated: 09/19/20 1600     Glucose 143 mg/dL      Comment: Serial Number: [id number]:  [id number]       BUN [[id number]]  (Abnormal) Collected: 09/19/20 1009    Specimen: Blood Updated: 09/19/20 1115     BUN 7 mg/dL     Procalcitonin [[id number]]  (Abnormal) Collected: 09/19/20 1009    Specimen: Blood Updated: 09/19/20 1106     Procalcitonin 0.55 ng/mL     Narrative:      As a Marker for Sepsis (Non-Neonates):   1. <0.5 ng/mL represents a low risk of severe sepsis and/or septic shock.  1. >2 ng/mL represents a high risk of severe sepsis and/or septic shock.    As a Marker for Lower Respiratory Tract Infections that require antibiotic therapy:  PCT on Admission     Antibiotic Therapy             6-12 Hrs later  > 0.5                Strongly Recommended            >0.25 - <0.5         Recommended  0.1 - 0.25           Discouraged                   Remeasure/reassess PCT  <0.1                 Strongly Discouraged          Remeasure/reassess PCT      As 28 day mortality risk marker: \"Change in Procalcitonin Result\" (> 80 % or <=80 %) if Day 0 (or Day 1) and Day 4 values are available. Refer to [email or web address]-[email or web address]   Change in PCT <=80 %   A decrease of PCT levels below or equal to 80 % defines a positive change in PCT test result representing a " higher risk for 28-day all-cause mortality of patients diagnosed with severe sepsis or septic shock.  Change in PCT > 80 %   A decrease of PCT levels of more than 80 % defines a negative change in PCT result representing a lower risk for 28-day all-cause mortality of patients diagnosed with severe sepsis or septic shock.                Results may be falsely decreased if patient taking Biotin.     Comprehensive Metabolic Panel [[id number]]  (Abnormal) Collected: 09/19/20 1009    Specimen: Blood Updated: 09/19/20 1104     Glucose 118 mg/dL      BUN --     Comment: Testing performed by alternate method        Creatinine 0.57 mg/dL      Sodium 133 mmol/L      Potassium 3.6 mmol/L      Chloride 97 mmol/L      CO2 25.0 mmol/L      Calcium 8.0 mg/dL      Total Protein 5.9 g/dL      Albumin 3.30 g/dL      ALT (SGPT) 45 U/L      AST (SGOT) 67 U/L      Alkaline Phosphatase 61 U/L      Total Bilirubin 0.3 mg/dL      eGFR Non African Amer 107 mL/min/1.73      Globulin 2.6 gm/dL      A/G Ratio 1.3 g/dL      BUN/Creatinine Ratio --     Comment: Testing not performed        Anion Gap 11.0 mmol/L     Narrative:      GFR Normal >60  Chronic Kidney Disease <60  Kidney Failure <15      Lactate Dehydrogenase [[id number]]  (Abnormal) Collected: 09/19/20 1009    Specimen: Blood Updated: 09/19/20 1104      U/L     C-reactive Protein [[id number]]  (Abnormal) Collected: 09/19/20 1009    Specimen: Blood Updated: 09/19/20 1104     C-Reactive Protein 11.34 mg/dL     Troponin [[id number]]  (Normal) Collected: 09/19/20 1009    Specimen: Blood Updated: 09/19/20 1104     Troponin T <0.010 ng/mL     Narrative:      Troponin T Reference Range:  <= 0.03 ng/mL-   Negative for AMI  >0.03 ng/mL-     Abnormal for myocardial necrosis.  Clinicians would have to utilize clinical acumen, EKG, Troponin and serial changes to determine if it is an Acute Myocardial Infarction or myocardial injury due to an underlying chronic condition.       Results may be  falsely decreased if patient taking Biotin.      Blood Gas, Arterial [[id number]]  (Abnormal) Collected: 09/19/20 0959    Specimen: Arterial Blood Updated: 09/19/20 1002     Site Right Radial     Blair's Test Positive     pH, Arterial 7.471 pH units      pCO2, Arterial 31.8 mm Hg      pO2, Arterial 76.9 mm Hg      HCO3, Arterial 23.2 mmol/L      Base Excess, Arterial 0.4 mmol/L      Comment: Serial Number: [id number]:  [id number]        O2 Saturation, Arterial 96.2 %      CO2 Content 24.2 mmol/L      Barometric Pressure for Blood Gas --     Comment: N/A        Modality Cannula     FIO2 40 %      Hemodilution No    CBC & Differential [[id number]]  (Abnormal) Collected: 09/19/20 0937    Specimen: Blood Updated: 09/19/20 1000    Narrative:      The following orders were created for panel order CBC & Differential.  Procedure                               Abnormality         Status                     ---------                               -----------         ------                     CBC Auto Differential[[id number]]        Abnormal            Final result                 Please view results for these tests on the individual orders.    CBC Auto Differential [[id number]]  (Abnormal) Collected: 09/19/20 0937    Specimen: Blood Updated: 09/19/20 1000     WBC 6.20 10*3/mm3      RBC 4.65 10*6/mm3      Hemoglobin 14.7 g/dL      Hematocrit 43.2 %      MCV 92.9 fL      MCH 31.5 pg      MCHC 34.0 g/dL      RDW 14.0 %      RDW-SD 45.1 fl      MPV 8.4 fL      Platelets 204 10*3/mm3      Neutrophil % 84.3 %      Lymphocyte % 10.2 %      Monocyte % 5.2 %      Eosinophil % 0.0 %      Basophil % 0.3 %      Neutrophils, Absolute 5.20 10*3/mm3      Lymphocytes, Absolute 0.60 10*3/mm3      Monocytes, Absolute 0.30 10*3/mm3      Eosinophils, Absolute 0.00 10*3/mm3      Basophils, Absolute 0.00 10*3/mm3      nRBC 0.0 /100 WBC         No results found for: HGBA1C        Results from last 7 days   Lab Units 09/19/20 0959   PH, ARTERIAL pH  units 7.471*   PO2 ART mm Hg 76.9*   PCO2, ARTERIAL mm Hg 31.8*   HCO3 ART mmol/L 23.2     No results found for: LIPASE  No results found for: CHOL, CHLPL, TRIG, HDL, LDL, LDLDIRECT    No results found for: INTRAOP, PREDX, FINALDX, COMDX    Microbiology Results (last 10 days)     Procedure Component Value - Date/Time    Respiratory Panel, PCR - Swab, Nasopharynx [[id number]]  (Normal) Collected: 09/19/20 1504    Lab Status: Final result Specimen: Swab from Nasopharynx Updated: 09/19/20 1651     ADENOVIRUS, PCR Not Detected     Coronavirus 229E Not Detected     Coronavirus HKU1 Not Detected     Coronavirus NL63 Not Detected     Coronavirus OC43 Not Detected     Human Metapneumovirus Not Detected     Human Rhinovirus/Enterovirus Not Detected     Influenza B PCR Not Detected     Parainfluenza Virus 1 Not Detected     Parainfluenza Virus 2 Not Detected     Parainfluenza Virus 3 Not Detected     Parainfluenza Virus 4 Not Detected     Bordetella pertussis pcr Not Detected     Influenza A H1 2009 PCR Not Detected     Chlamydophila pneumoniae PCR Not Detected     Mycoplasma pneumo by PCR Not Detected     Influenza A PCR Not Detected     Influenza A H3 Not Detected     Influenza A H1 Not Detected     RSV, PCR Not Detected     Bordetella parapertussis PCR Not Detected    Narrative:      The coronavirus on the RVP is NOT COVID-19 and is NOT indicative of infection with COVID-19.     Blood Culture - Blood, Arm, Right [[id number]] Collected: 09/19/20 0937    Lab Status: Preliminary result Specimen: Blood from Arm, Right Updated: 09/20/20 0945     Blood Culture No growth at 24 hours    COVID-19,[facility],NP/OP Swab in Transport Media or ESwab 72 HR TAT - Swab, Nasopharynx [[id number]]  (Abnormal) Collected: 09/10/20 1313    Lab Status: Final result Specimen: Swab from Nasopharynx Updated: 09/12/20 2207    Narrative:      The following orders were created for panel order COVID-19,[facility],NP/OP Swab in Transport Media or ESwab 72 HR TAT  - Swab, Nasopharynx.  Procedure                               Abnormality         Status                     ---------                               -----------         ------                     COVID-19,[facility] ROUTINE...[[id number]]  Abnormal            Final result                 Please view results for these tests on the individual orders.    COVID-19,[facility] ROUTINE, NP/OP SWAB IN TRANSPORT MEDIA OR ESWAB 72 HR TAT - Swab, Nasopharynx [[id number]]  (Abnormal) Collected: 09/10/20 1313    Lab Status: Final result Specimen: Swab from Nasopharynx Updated: 09/12/20 2207     SARS-CoV-2, MARILYN Detected     Comment: This nucleic acid amplification test was developed and its performance  characteristics determined by [employer] Nucleic acid  amplification tests include PCR and TMA. This test has not been FDA  cleared or approved. This test has been authorized by FDA under an  Emergency Use Authorization (EUA). This test is only authorized for  the duration of time the declaration that circumstances exist  justifying the authorization of the emergency use of in vitro  diagnostic tests for detection of SARS-CoV-2 virus and/or diagnosis  of COVID-19 infection under section 564(b)(1) of the Act, 21 U.S.C.  360bbb-3(b) (1), unless the authorization is terminated or revoked  sooner.  When diagnostic testing is negative, the possibility of a false  negative result should be considered in the context of a patient's  recent exposures and the presence of clinical signs and symptoms  consistent with COVID-19. An individual without symptoms of COVID-19  and who is not shedding SARS-CoV-2 virus would expect to have a  negative (not detected) result in this assay.       Narrative:      Performed at:   - [facility]  [address] [facility] [place], [place]  [id number]  : Eliza Rose MD, Phone:  [phone]          ECG/EMG Results (most recent)     Procedure Component Value Units Date/Time    ECG 12 Lead  [[id number]] Collected: 09/19/20 0944     Updated: 09/20/20 0831    Narrative:      HEART RATE= 110  bpm  RR Interval= 544  ms  W[place] Interval= 158  ms  P Horizontal Axis= 0  deg  P Front Axis= 61  deg  QRSD Interval= 84  ms  QT Interval= 319  ms  QRS Axis= 5  deg  T Wave Axis= 62  deg  - OTHERWISE NORMAL ECG -  Sinus tachycardia  No previous ECG available for comparison  Electronically Signed By: Reid Pollock (JAYNE) 20-Sep-2020 08:30:38  Date and Time of Study: 2020-09-19 09:44:09                    Xr Chest Ap    Result Date: 9/19/2020  Multifocal consolidation involving the right midlung and lung bases bilaterally consistent with multifocal pneumonia, worsened from 09/10/2020.  Electronically Signed By-Ramiro Villa On:9/19/2020 10:30 AM This report was finalized on [id number] by  Ramiro Villa, .          Xrays, labs reviewed personally by physician.    Medication Review:   I have reviewed the patient's current medication list      Scheduled Meds  azithromycin, 250 mg, Oral, Q24H  budesonide-formoterol, 2 puff, Inhalation, BID - RT  cetirizine, 10 mg, Oral, Daily  dexamethasone, 6 mg, Oral, Daily With Breakfast  enoxaparin, 40 mg, Subcutaneous, Q24H  INV [id number] remdesivir in NS IVPB, 100 mg, Intravenous, Q24H  sodium chloride, 10 mL, Intravenous, Q12H        Meds Infusions  Pharmacy Consult - Remdesivir,   Pharmacy Consult - Steroid Insulin Protocol,         Meds PRN  •  acetaminophen **OR** acetaminophen **OR** acetaminophen  •  albuterol sulfate HFA  •  aluminum-magnesium hydroxide-simethicone  •  melatonin  •  nitroglycerin  •  ondansetron **OR** ondansetron  •  Pharmacy Consult - Remdesivir  •  Pharmacy Consult - Steroid Insulin Protocol  •  [COMPLETED] Insert peripheral IV **AND** sodium chloride  •  sodium chloride        Assessment/Plan   Assessment/Plan     Active Hospital Problems    Diagnosis  POA   • COVID-19 virus infection [U07.1]  Yes      Resolved Hospital Problems   No resolved problems to display.        MEDICAL DECISION MAKING COMPLEXITY BY PROBLEM:     Multifocal pneumonia due to COVID-19 infection  Tested positive for COVID-19 on 09/10/2020   respiratory viral panel insignificant  Chest x-ray reviewed-showed worsening of the focal pneumonia  on Decadron and remdesivir   Also started on azithromycin  Respiratory care with bronchodilator  Oxygen therapy and titration  Airborne and contact precaution initiated  Monitor inflammatory markers     Fever-most likely due to above  Analgesic as needed     Acute hypoxic respiratory failure on chronic respiratory failure  Currently on 11 L of oxygen via nonrebreather mask  Oxygen therapy and titration        DVT prophylaxis with Lovenox         Mechanical Order History:     None      Pharmalogical Order History:      Ordered     Dose Route Frequency Stop    09/19/20 3498  enoxaparin (LOVENOX) syringe 40 mg      40 mg SC Every 24 Hours --                  Code Status -   Code Status and Medical Interventions:   Ordered at: 09/19/20 1358     Level Of Support Discussed With:    Patient     Code Status:    CPR     Medical Interventions (Level of Support Prior to Arrest):    Full       This patient has been examined with appropriate PPE. 09/20/20        Discharge Planning  Pending clinical progress          Electronically signed by Alex Leonard MD, 09/20/20, 09:52 EDT.  [facility]ist Team

## 2020-09-20 NOTE — PLAN OF CARE
Goal Outcome Evaluation:  Plan of Care Reviewed With: patient  Progress: no change  Outcome Summary: Pt. admitted for COVID. Reports increased SOA at home leading her to come to hospital. Remains on Remdesivir. Plans to return home

## 2020-09-21 ENCOUNTER — APPOINTMENT (OUTPATIENT)
Dept: GENERAL RADIOLOGY | Facility: HOSPITAL | Age: 65
End: 2020-09-21

## 2020-09-21 LAB
ABO GROUP BLD: NORMAL
ALBUMIN SERPL-MCNC: 2.9 G/DL (ref 3.5–5.2)
ALBUMIN/GLOB SERPL: 1.2 G/DL
ALP SERPL-CCNC: 64 U/L (ref 39–117)
ALT SERPL W P-5'-P-CCNC: 42 U/L (ref 1–33)
ANION GAP SERPL CALCULATED.3IONS-SCNC: 10 MMOL/L (ref 5–15)
ANION GAP SERPL CALCULATED.3IONS-SCNC: 8 MMOL/L (ref 5–15)
ANTI-M: NORMAL
AST SERPL-CCNC: 52 U/L (ref 1–32)
BASOPHILS # BLD AUTO: 0 10*3/MM3 (ref 0–0.2)
BASOPHILS NFR BLD AUTO: 0.1 % (ref 0–1.5)
BILIRUB SERPL-MCNC: 0.3 MG/DL (ref 0–1.2)
BLD GP AB SCN SERPL QL: POSITIVE
BUN SERPL-MCNC: 16 MG/DL (ref 8–23)
BUN SERPL-MCNC: 16 MG/DL (ref 8–23)
BUN SERPL-MCNC: ABNORMAL MG/DL
BUN SERPL-MCNC: ABNORMAL MG/DL
BUN/CREAT SERPL: ABNORMAL
BUN/CREAT SERPL: ABNORMAL
CALCIUM SPEC-SCNC: 7.9 MG/DL (ref 8.6–10.5)
CALCIUM SPEC-SCNC: 7.9 MG/DL (ref 8.6–10.5)
CHLORIDE SERPL-SCNC: 104 MMOL/L (ref 98–107)
CHLORIDE SERPL-SCNC: 104 MMOL/L (ref 98–107)
CO2 SERPL-SCNC: 26 MMOL/L (ref 22–29)
CO2 SERPL-SCNC: 28 MMOL/L (ref 22–29)
CREAT SERPL-MCNC: 0.59 MG/DL (ref 0.57–1)
CREAT SERPL-MCNC: 0.63 MG/DL (ref 0.57–1)
D DIMER PPP FEU-MCNC: 0.57 MG/L (FEU) (ref 0–0.59)
DEPRECATED RDW RBC AUTO: 46.4 FL (ref 37–54)
EOSINOPHIL # BLD AUTO: 0 10*3/MM3 (ref 0–0.4)
EOSINOPHIL NFR BLD AUTO: 0 % (ref 0.3–6.2)
ERYTHROCYTE [DISTWIDTH] IN BLOOD BY AUTOMATED COUNT: 14 % (ref 12.3–15.4)
FERRITIN SERPL-MCNC: 1254 NG/ML (ref 13–150)
GFR SERPL CREATININE-BSD FRML MDRD: 103 ML/MIN/1.73
GFR SERPL CREATININE-BSD FRML MDRD: 95 ML/MIN/1.73
GLOBULIN UR ELPH-MCNC: 2.5 GM/DL
GLUCOSE BLDC GLUCOMTR-MCNC: 133 MG/DL (ref 70–105)
GLUCOSE BLDC GLUCOMTR-MCNC: 153 MG/DL (ref 70–105)
GLUCOSE BLDC GLUCOMTR-MCNC: 155 MG/DL (ref 70–105)
GLUCOSE BLDC GLUCOMTR-MCNC: 178 MG/DL (ref 70–105)
GLUCOSE SERPL-MCNC: 132 MG/DL (ref 65–99)
GLUCOSE SERPL-MCNC: 141 MG/DL (ref 65–99)
HCT VFR BLD AUTO: 41.9 % (ref 34–46.6)
HGB BLD-MCNC: 13.8 G/DL (ref 12–15.9)
LDH SERPL-CCNC: 415 U/L (ref 135–214)
LYMPHOCYTES # BLD AUTO: 0.8 10*3/MM3 (ref 0.7–3.1)
LYMPHOCYTES NFR BLD AUTO: 13.3 % (ref 19.6–45.3)
MCH RBC QN AUTO: 31.3 PG (ref 26.6–33)
MCHC RBC AUTO-ENTMCNC: 32.9 G/DL (ref 31.5–35.7)
MCV RBC AUTO: 95.3 FL (ref 79–97)
MONOCYTES # BLD AUTO: 0.7 10*3/MM3 (ref 0.1–0.9)
MONOCYTES NFR BLD AUTO: 10.9 % (ref 5–12)
NEUTROPHILS NFR BLD AUTO: 4.5 10*3/MM3 (ref 1.7–7)
NEUTROPHILS NFR BLD AUTO: 75.7 % (ref 42.7–76)
NRBC BLD AUTO-RTO: 0.1 /100 WBC (ref 0–0.2)
NT-PROBNP SERPL-MCNC: 516.2 PG/ML (ref 0–900)
PLATELET # BLD AUTO: 277 10*3/MM3 (ref 140–450)
PMV BLD AUTO: 7.7 FL (ref 6–12)
POTASSIUM SERPL-SCNC: 4 MMOL/L (ref 3.5–5.2)
POTASSIUM SERPL-SCNC: 4.2 MMOL/L (ref 3.5–5.2)
PROT SERPL-MCNC: 5.4 G/DL (ref 6–8.5)
RBC # BLD AUTO: 4.39 10*6/MM3 (ref 3.77–5.28)
RH BLD: POSITIVE
SODIUM SERPL-SCNC: 140 MMOL/L (ref 136–145)
SODIUM SERPL-SCNC: 140 MMOL/L (ref 136–145)
T&S EXPIRATION DATE: NORMAL
WBC # BLD AUTO: 6 10*3/MM3 (ref 3.4–10.8)

## 2020-09-21 PROCEDURE — 85379 FIBRIN DEGRADATION QUANT: CPT | Performed by: INTERNAL MEDICINE

## 2020-09-21 PROCEDURE — 80048 BASIC METABOLIC PNL TOTAL CA: CPT | Performed by: INTERNAL MEDICINE

## 2020-09-21 PROCEDURE — 86900 BLOOD TYPING SEROLOGIC ABO: CPT | Performed by: INTERNAL MEDICINE

## 2020-09-21 PROCEDURE — 85025 COMPLETE CBC W/AUTO DIFF WBC: CPT | Performed by: INTERNAL MEDICINE

## 2020-09-21 PROCEDURE — 83880 ASSAY OF NATRIURETIC PEPTIDE: CPT | Performed by: INTERNAL MEDICINE

## 2020-09-21 PROCEDURE — 80053 COMPREHEN METABOLIC PANEL: CPT | Performed by: INTERNAL MEDICINE

## 2020-09-21 PROCEDURE — 82962 GLUCOSE BLOOD TEST: CPT

## 2020-09-21 PROCEDURE — 86901 BLOOD TYPING SEROLOGIC RH(D): CPT | Performed by: INTERNAL MEDICINE

## 2020-09-21 PROCEDURE — 86870 RBC ANTIBODY IDENTIFICATION: CPT | Performed by: INTERNAL MEDICINE

## 2020-09-21 PROCEDURE — 83615 LACTATE (LD) (LDH) ENZYME: CPT | Performed by: INTERNAL MEDICINE

## 2020-09-21 PROCEDURE — 25010000002 DEXAMETHASONE SODIUM PHOSPHATE 10 MG/ML SOLUTION: Performed by: INTERNAL MEDICINE

## 2020-09-21 PROCEDURE — 86901 BLOOD TYPING SEROLOGIC RH(D): CPT

## 2020-09-21 PROCEDURE — 71045 X-RAY EXAM CHEST 1 VIEW: CPT

## 2020-09-21 PROCEDURE — 82728 ASSAY OF FERRITIN: CPT | Performed by: INTERNAL MEDICINE

## 2020-09-21 PROCEDURE — 94799 UNLISTED PULMONARY SVC/PX: CPT

## 2020-09-21 PROCEDURE — 86900 BLOOD TYPING SEROLOGIC ABO: CPT

## 2020-09-21 PROCEDURE — 86922 COMPATIBILITY TEST ANTIGLOB: CPT

## 2020-09-21 PROCEDURE — 25010000002 CEFEPIME PER 500 MG: Performed by: INTERNAL MEDICINE

## 2020-09-21 PROCEDURE — 99233 SBSQ HOSP IP/OBS HIGH 50: CPT | Performed by: INTERNAL MEDICINE

## 2020-09-21 PROCEDURE — 86850 RBC ANTIBODY SCREEN: CPT | Performed by: INTERNAL MEDICINE

## 2020-09-21 PROCEDURE — 25010000002 ENOXAPARIN PER 10 MG: Performed by: INTERNAL MEDICINE

## 2020-09-21 RX ORDER — PANTOPRAZOLE SODIUM 40 MG/1
40 TABLET, DELAYED RELEASE ORAL
Status: DISCONTINUED | OUTPATIENT
Start: 2020-09-22 | End: 2020-09-24 | Stop reason: HOSPADM

## 2020-09-21 RX ORDER — DEXAMETHASONE SODIUM PHOSPHATE 10 MG/ML
6 INJECTION, SOLUTION INTRAMUSCULAR; INTRAVENOUS 2 TIMES DAILY
Status: DISCONTINUED | OUTPATIENT
Start: 2020-09-21 | End: 2020-09-23

## 2020-09-21 RX ADMIN — BUDESONIDE AND FORMOTEROL FUMARATE DIHYDRATE 2 PUFF: 160; 4.5 AEROSOL RESPIRATORY (INHALATION) at 19:57

## 2020-09-21 RX ADMIN — ENOXAPARIN SODIUM 40 MG: 40 INJECTION SUBCUTANEOUS at 17:24

## 2020-09-21 RX ADMIN — BUDESONIDE AND FORMOTEROL FUMARATE DIHYDRATE 2 PUFF: 160; 4.5 AEROSOL RESPIRATORY (INHALATION) at 07:23

## 2020-09-21 RX ADMIN — Medication 10 ML: at 21:21

## 2020-09-21 RX ADMIN — CETIRIZINE HYDROCHLORIDE 10 MG: 10 TABLET, FILM COATED ORAL at 08:15

## 2020-09-21 RX ADMIN — CEFEPIME 2 G: 2 INJECTION, POWDER, FOR SOLUTION INTRAVENOUS at 17:24

## 2020-09-21 RX ADMIN — DEXAMETHASONE SODIUM PHOSPHATE 6 MG: 10 INJECTION, SOLUTION INTRAMUSCULAR; INTRAVENOUS at 21:21

## 2020-09-21 RX ADMIN — AZITHROMYCIN MONOHYDRATE 250 MG: 250 TABLET ORAL at 08:15

## 2020-09-21 RX ADMIN — DEXAMETHASONE 6 MG: 6 TABLET ORAL at 08:15

## 2020-09-21 RX ADMIN — SODIUM CHLORIDE 100 MG: 9 INJECTION, SOLUTION INTRAVENOUS at 17:25

## 2020-09-21 RX ADMIN — Medication 10 ML: at 08:15

## 2020-09-21 NOTE — PAYOR COMM NOTE
"AUTHORIZATION PENDING:   PLEASE CALL OR FAX DETERMINATION TO CONTACT BELOW. THANK YOU.        Sharon Lackey RN MSN  /UR  [facility]  [phone] office  [phone] fax  [email or web address]    [facility]  NPI: [id number]  Tax: [id number]      Monisha Wilder (65 y.o. Female)     Date of Birth Social Security Number Address Home Phone MRN    [id number]  [id number]   [place] [address] [phone] [id number]    Restorationist Marital Status          Non-Christianity        Admission Date Admission Type Admitting Provider Attending Provider Department, Room/Bed    9/19/20 Emergency Alex Leonard MD Ozor, Uchenna, MD [facility] BOB 2A PEDIATRICS, 201/1    Discharge Date Discharge Disposition Discharge Destination                       Attending Provider: Alex Leonard MD    Allergies: Sulfa Antibiotics    Isolation: Enh Drop/Con   Infection: COVID (confirmed) (09/12/20)   Code Status: CPR    Ht: 162.6 cm (64\")   Wt: 101 kg (221 lb 11.2 oz)    Admission Cmt: None   Principal Problem: None                Active Insurance as of 9/19/2020     Primary Coverage     Payor Plan Insurance Group Employer/Plan Group    [employer] BLUE [employer] BLUE [employer] BLUE [facility] PPO [id number]     Payor Plan Address Payor Plan Phone Number Payor Plan Fax Number Effective Dates    [address] [phone]  1/1/2020 - None Entered    [facility] [id number]       Subscriber Name Subscriber Birth Date Member ID       CHENTE TREVIÑO 1955 [id number]           Secondary Coverage     Payor Plan Insurance Group Employer/Plan Group    MEDICARE MEDICARE A & B      Payor Plan Address Payor Plan Phone Number Payor Plan Fax Number Effective Dates    [address] [phone]  1/1/2013 - None Entered    [facility] [id number]       Subscriber Name Subscriber Birth Date Member ID       MONISHA WILDER 1955 [id number]                 Emergency Contacts      " (Rel.) Home Phone Work Phone Mobile Phone    CHENTE TREVIÑO (Spouse) -- -- [phone]        20 1357  Inpatient Admission Once    Completed   Level of Care: Med/Surg    Diagnosis: COVID-19 virus infection [[id number]]    Admitting Physician: TANG LEONARD [[id number]]    Attending Physician: TANG LEONARD [[id number]]    Certification: I certify that inpatient hospital services are medically necessary for greater than 2 midnights.                Criteria Review   Patient admitted via ER with complaints of increasing shortness of breath, fever, nausea.   Patient states she received positive Covid-19 test approximately six days ago, however her symptoms have worsened over the last two day.   Patient was febrile upon presentation to the ER and noted to have a O2 sat of 85% on room air.   Patient with clinical support [facility].     Pneumonia RRG  RRG: M-282-RRG (ISC)  Link to Codes  Admission is indicated for 1 or more of the following[B][  Hypoxemia as indicated by 1 or more of the following(1):  Patient with baseline need for supplemental oxygen who now requires increased supplemental oxygen to maintain oxygenation at baseline or acceptable level          History & Physical      Tang Leonard MD at 20 1327                [facility] Medicine Services      Patient Name: Monisha Treviño  : 1955  MRN: [id number]  Primary Care Physician: Polly Domingo MD  Date of admission: 2020    Patient Care Team:  Polly Domingo MD as PCP - General (Family Medicine)  Bess Serrato MD as PCP - Claims Attributed  Bess Serrato MD as Consulting Physician (Pulmonary Disease)          Subjective   History Present Illness     Chief Complaint:   Chief Complaint   Patient presents with   • Fever       Shortness of breath         History of Present Illness    Patient is a 65-year-old female with history of COPD on home oxygen 2.5 L at bedtime.  Presented to  the emergency room on 9/20/2020 with complaints of cough productive of brownish sputum, progressively worsening shortness of breath and body aches.  Patient stated symptoms started about 10 days ago  and she had gone to an outlying urgent care facility where she was diagnosed with COVID-19 on 9/11/2020.  Since then her symptoms continue to worsen and she now has to use her home oxygen continuously.  Related having subjective fever and chills  Of note is that her  is also positive for COVID-19.  In the ED, temperature was 101.6 and patient 's oxygen saturation was 86% on room air improved to 93% with 5 L of oxygen via nasal cannula.  Chest x-ray done showed worsening multifocal pneumonia.    Patient was admitted for further care.    Review of Systems   Constitution: Positive for chills, fever and malaise/fatigue.   HENT: Positive for congestion.    Eyes: Negative for blurred vision.   Cardiovascular: Negative for chest pain.   Respiratory: Positive for cough and shortness of breath. Negative for hemoptysis.    Endocrine: Negative for cold intolerance and heat intolerance.   Gastrointestinal: Positive for diarrhea and nausea. Negative for abdominal pain and vomiting.   Genitourinary: Negative for dysuria.   Neurological: Positive for weakness.   Psychiatric/Behavioral: Negative for altered mental status.           Personal History     Past Medical History:   Past Medical History:   Diagnosis Date   • COPD (chronic obstructive pulmonary disease) (CMS/[facility])    • Sleep apnea        Surgical History:      Past Surgical History:   Procedure Laterality Date   • TONSILLECTOMY     • TUBAL ABDOMINAL LIGATION             Family History: family history is not on file. Otherwise pertinent FHx was reviewed and unremarkable.     Social History:  reports that she has quit smoking. She has never used smokeless tobacco. She reports that she does not drink alcohol or use drugs.      Medications:  Prior to Admission medications       Medication Sig Start Date End Date Taking? Authorizing Provider   albuterol (PROVENTIL) (2.5 MG/3ML) 0.083% nebulizer solution Take 2.5 mg by nebulization Every 6 (Six) Hours As Needed for Wheezing or Shortness of Air.   Yes Provider, MD Angel   albuterol sulfate  (90 Base) MCG/ACT inhaler Inhale 2 puffs Every 4 (Four) Hours As Needed. 7/7/20  Yes Emergency, Nurse Harris RN   loratadine (CLARITIN) 10 MG tablet Take 10 mg by mouth every night at bedtime. 7/6/20  Yes Emergency, Nurse Epic, RN   TRELEGY ELLIPTA 100-62.5-25 MCG/INH aerosol powder  Inhale 1 puff Daily. 8/12/20  Yes Emergency, Nurse Epic, RN   brompheniramine-pseudoephedrine-DM 30-2-10 MG/5ML syrup Take 5 mL by mouth 4 (Four) Times a Day As Needed for Congestion or Cough. 9/10/20   Taj Belcher APRN   doxycycline (VIBRAMYCIN) 100 MG capsule Take 1 capsule by mouth 2 (Two) Times a Day. 9/10/20   Taj Belcher APRN   albuterol sulfate HFA (ProAir HFA) 108 (90 Base) MCG/ACT inhaler PROAIR  (90 Base) MCG/ACT AERS 2/18/16 9/19/20  Emergency, Nurse Epic, RN       Allergies:    Allergies   Allergen Reactions   • Sulfa Antibiotics Anaphylaxis       Objective   Objective     Vital Signs  Temp:  [97.8 °F (36.6 °C)-101.6 °F (38.7 °C)] 97.8 °F (36.6 °C)  Heart Rate:  [101-119] 101  Resp:  [16-22] 22  BP: (101-146)/(68-85) 123/74  SpO2:  [86 %-95 %] 92 %  on  Flow (L/min):  [5] 5;   Device (Oxygen Therapy): nonrebreather mask  Body mass index is 38.03 kg/m².    Physical Exam  Vitals signs reviewed.   Constitutional:       General: She is not in acute distress.  HENT:      Head: Normocephalic and atraumatic.      Nose: Nose normal.   Eyes:      Extraocular Movements: Extraocular movements intact.      Pupils: Pupils are equal, round, and reactive to light.   Neck:      Musculoskeletal: Neck supple.   Cardiovascular:      Rate and Rhythm: Normal rate and regular rhythm.   Pulmonary:      Effort: No respiratory distress.      Comments:  Decreased air entry at the bases  Abdominal:      General: Bowel sounds are normal.      Palpations: Abdomen is soft.      Tenderness: There is no abdominal tenderness.   Musculoskeletal: Normal range of motion.   Skin:     General: Skin is warm and dry.   Neurological:      General: No focal deficit present.      Mental Status: She is alert and oriented to person, place, and time.   Psychiatric:         Mood and Affect: Mood normal.         Results Review:  I have personally reviewed most recent blood work and imaging     Results from last 7 days   Lab Units 09/19/20  0937   WBC 10*3/mm3 6.20   HEMOGLOBIN g/dL 14.7   HEMATOCRIT % 43.2   PLATELETS 10*3/mm3 204     Results from last 7 days   Lab Units 09/19/20  1009 09/19/20  0935   SODIUM mmol/L 133*  --    POTASSIUM mmol/L 3.6  --    CHLORIDE mmol/L 97*  --    CO2 mmol/L 25.0  --    BUN  7*  --    CREATININE mg/dL 0.57  --    GLUCOSE mg/dL 118*  --    CALCIUM mg/dL 8.0*  --    ALT (SGPT) U/L 45*  --    AST (SGOT) U/L 67*  --    TROPONIN T ng/mL <0.010  --    LACTATE mmol/L  --  1.0   PROCALCITONIN ng/mL 0.55*  --      Estimated Creatinine Clearance: 115.2 mL/min (by C-G formula based on SCr of 0.57 mg/dL).  Brief Urine Lab Results     None          Microbiology Results (last 10 days)     Procedure Component Value - Date/Time    COVID-19,[facility],NP/OP Swab in Transport Media or ESwab 72 HR TAT - Swab, Nasopharynx [[id number]]  (Abnormal) Collected: 09/10/20 1313    Lab Status: Final result Specimen: Swab from Nasopharynx Updated: 09/12/20 1782    Narrative:      The following orders were created for panel order COVID-19,[facility],NP/OP Swab in Transport Media or ESwab 72 HR TAT - Swab, Nasopharynx.  Procedure                               Abnormality         Status                     ---------                               -----------         ------                     COVID-19,[facility] ROUTINE...[[id number]]  Abnormal            Final result                 Please view  results for these tests on the individual orders.    COVID-19,[facility] ROUTINE, NP/OP SWAB IN TRANSPORT MEDIA OR ESWAB 72 HR TAT - Swab, Nasopharynx [[id number]]  (Abnormal) Collected: 09/10/20 1313    Lab Status: Final result Specimen: Swab from Nasopharynx Updated: 09/12/20 2207     SARS-CoV-2, MARILYN Detected     Comment: This nucleic acid amplification test was developed and its performance  characteristics determined by [facility]. Nucleic acid  amplification tests include PCR and TMA. This test has not been FDA  cleared or approved. This test has been authorized by FDA under an  Emergency Use Authorization (EUA). This test is only authorized for  the duration of time the declaration that circumstances exist  justifying the authorization of the emergency use of in vitro  diagnostic tests for detection of SARS-CoV-2 virus and/or diagnosis  of COVID-19 infection under section 564(b)(1) of the Act, 21 U.S.C.  360bbb-3(b) (1), unless the authorization is terminated or revoked  sooner.  When diagnostic testing is negative, the possibility of a false  negative result should be considered in the context of a patient's  recent exposures and the presence of clinical signs and symptoms  consistent with COVID-19. An individual without symptoms of COVID-19  and who is not shedding SARS-CoV-2 virus would expect to have a  negative (not detected) result in this assay.       Narrative:      Performed at:  [address] [place] [address] Central Laboratory  [address] [facility] [place]  [id number]  : Eliza Rose MD, Phone:  [phone]          ECG/EMG Results (most recent)     Procedure Component Value Units Date/Time    ECG 12 Lead [[id number]] Collected: 09/19/20 0944     Updated: 09/19/20 0946    Narrative:      HEART RATE= 110  bpm  RR Interval= 544  ms  PA Interval= 158  ms  P Horizontal Axis= 0  deg  P Front Axis= 61  deg  QRSD Interval= 84  ms  QT Interval= 319  ms  QRS Axis= 5  deg  T Wave Axis= 62  deg  - OTHERWISE  NORMAL ECG -  Sinus tachycardia  No previous ECG available for comparison  Electronically Signed By:   Date and Time of Study: 2020-09-19 09:44:09                    Xr Chest Ap    Result Date: 9/19/2020  Multifocal consolidation involving the right midlung and lung bases bilaterally consistent with multifocal pneumonia, worsened from 09/10/2020.  Electronically Signed By-Ramiro Villa On:9/19/2020 10:30 AM This report was finalized on [id number] by  Ramiro Villa, .        Estimated Creatinine Clearance: 115.2 mL/min (by C-G formula based on SCr of 0.57 mg/dL).    Assessment/Plan   Assessment/Plan       Active Hospital Problems    Diagnosis  POA   • COVID-19 virus infection [U07.1]  Yes      Resolved Hospital Problems   No resolved problems to display.     Multifocal pneumonia due to COVID-19 infection  Tested positive for COVID-19 on 09/10/2020  Will  check respiratory viral panel  Chest x-ray reviewed-showed worsening of the focal pneumonia  Will start on Decadron and remdesivir   Also started on azithromycin  Respiratory care with bronchodilator  Oxygen therapy and titration  Airborne and contact precaution initiated  Monitor inflammatory markers    Fever-most likely due to above  Analgesic as needed    Acute hypoxic respiratory failure on chronic respiratory failure  On oxygen via nasal cannula-currently on 5 L  Oxygen therapy and titration      DVT prophylaxis with Lovenox        Further recommendation following clinical course    The FDA has authorized the emergency use of remdesivir, which is not an FDA approved drug. Discussions with the patient/patient caregiver regarding the risks and benefits of remdesivir have occurred. The patient/patient caregiver verbalizes recognition that this is an investigational medication which may offer both benefits and risks, the extent of which are unknown. Information on available alternative treatments and the risks and benefits of those alternatives was discussed. “Fact  Sheet for Patients and Parents/Caregivers” was discussed with the patient/patient caregiver. All questions were answered to satisfaction and the patient/patient caregiver would like to proceed treating with remdesivir.                Mechanical Order History:     None      Pharmalogical Order History:     None          CODE STATUS:    Code Status and Medical Interventions:   Ordered at: 09/19/20 1358     Level Of Support Discussed With:    Patient     Code Status:    CPR     Medical Interventions (Level of Support Prior to Arrest):    Full       This patient has been examined with appropriate PPE. 09/19/20      I discussed the patient's findings and my recommendations with patient's    Electronically signed by Alex Leonard MD, 09/19/20, 1:27 PM EDT.  [facility] Hospitalist Team          Electronically signed by Alex Leonard MD at 09/19/20 1359          Emergency Department Notes      Cari Kamara RN at 09/19/20 0921        Bodyaches, fever at home. Was diagnosed with covid about 1 week ago. History of COPD. Coughs only after breathing treatments     Cari Kamara RN  09/19/20 0922      Electronically signed by Cari Kamara RN at 09/19/20 0922     Steffany Mcmanus APRN at 09/19/20 0928     Attestation signed by Reid Pollock MD at 09/19/20 1331          For this patient encounter, I reviewed the NP or PA documentation, treatment plan, and medical decision making. Reid Pollock MD 9/19/2020 13:37 EDT                  Subjective   Patient is a 65-year-old obese white female with history of COPD who presents today with complaints of fever chills body aches cough congestion shortness of breath.  Patient states she normally only wears oxygen at night however she has been wearing it continuously for the last several days.  Patient states her symptoms started 10 days ago.  She states at the onset of her symptoms she was seen in urgent care and tested for COVID.  She states 6 days ago she received  positive results.  She states her  also had COVID and thinks she contracted it from him.  She states in the last 24 to 48 hours her symptoms have worsened.  She denies any chest pain.  She denies any nausea or vomiting but does report a few nonbloody loose stools.  She denies any lower extremity pain or edema.  She denies any recent travel or prolonged immobilization.          Review of Systems   Constitutional: Positive for chills, fatigue and fever.   HENT: Positive for congestion. Negative for sore throat.    Respiratory: Positive for cough and shortness of breath.    Cardiovascular: Negative for chest pain and leg swelling.   Gastrointestinal: Positive for diarrhea. Negative for abdominal pain, nausea and vomiting.   Genitourinary: Negative for decreased urine volume and dysuria.   Musculoskeletal: Positive for myalgias.   Skin: Negative for rash.   Neurological: Positive for weakness and headaches. Negative for dizziness and numbness.       Past Medical History:   Diagnosis Date   • COPD (chronic obstructive pulmonary disease) (CMS/[facility])        Allergies   Allergen Reactions   • Sulfa Antibiotics Anaphylaxis       Past Surgical History:   Procedure Laterality Date   • TONSILLECTOMY     • TUBAL ABDOMINAL LIGATION         No family history on file.    Social History     Socioeconomic History   • Marital status:      Spouse name: Not on file   • Number of children: Not on file   • Years of education: Not on file   • Highest education level: Not on file   Tobacco Use   • Smoking status: Former Smoker   • Smokeless tobacco: Never Used   • Tobacco comment: quit 2011   Substance and Sexual Activity   • Alcohol use: Never     Frequency: Never   • Drug use: Never           Objective   Physical Exam  Vital signs and triage nurse note reviewed.  Constitutional: Awake, alert; well-developed and well-nourished. No acute distress is noted.  HEENT: Normocephalic, atraumatic; pupils are PERRL with intact EOM;  oropharynx is pink and moist without exudate or erythema.  No drooling or pooling of oral secretions.  Neck: Supple, full range of motion without pain; no cervical lymphadenopathy. Normal phonation.  Cardiovascular: Regular rate and rhythm, normal S1-S2.  No murmur noted.  Pulmonary: Respiratory effort regular nonlabored, mildly tachypneic, breath sounds bibasilar crackles.  No wheezes or rhonchi noted.  Abdomen: Soft, nontender, nondistended with normoactive bowel sounds; no rebound or guarding.  Musculoskeletal: Independent range of motion of all extremities with no palpable tenderness or edema.  Calves are symmetric and nontender.  Neuro: Alert oriented x3, speech is clear and appropriate, GCS 15.    Skin: Flesh tone, warm, dry, intact; no erythematous or petechial rash or lesion.      Procedures          ED Course  ED Course as of Sep 19 1101   Sat Sep 19, 2020   1052 EKG reviewed by me and interpreted by Dr. Pollock:  Sinus tachycardia with ventricular rate of 110.  No acute ST or T wave changes noted.     [MD]      ED Course User Index  [MD] Steffany Mcmanus APRN      Labs Reviewed   CBC WITH AUTO DIFFERENTIAL - Abnormal; Notable for the following components:       Result Value    Neutrophil % 84.3 (*)     Lymphocyte % 10.2 (*)     Eosinophil % 0.0 (*)     Lymphocytes, Absolute 0.60 (*)     All other components within normal limits   BLOOD GAS, ARTERIAL - Abnormal; Notable for the following components:    pH, Arterial 7.471 (*)     pCO2, Arterial 31.8 (*)     pO2, Arterial 76.9 (*)     All other components within normal limits   POC LACTATE - Normal   BLOOD CULTURE   BLOOD CULTURE   BLOOD GAS, ARTERIAL   COMPREHENSIVE METABOLIC PANEL   LACTATE DEHYDROGENASE   PROCALCITONIN   C-REACTIVE PROTEIN   TROPONIN (IN-HOUSE)   BUN   POC LACTATE   CBC AND DIFFERENTIAL    Narrative:     The following orders were created for panel order CBC & Differential.  Procedure                               Abnormality         Status                      ---------                               -----------         ------                     CBC Auto Differential[[id number]]        Abnormal            Final result                 Please view results for these tests on the individual orders.     Xr Chest Ap    Result Date: 9/19/2020  Multifocal consolidation involving the right midlung and lung bases bilaterally consistent with multifocal pneumonia, worsened from 09/10/2020.  Electronically Signed By-Ramiro Villa On:9/19/2020 10:30 AM This report was finalized on [id number] by  Ramiro Villa, .    Medications   sodium chloride 0.9 % flush 10 mL (has no administration in time range)   cefTRIAXone (ROCEPHIN) in SWFI 1 gram/10ml IV PUSH syringe (has no administration in time range)   azithromycin (ZITHROMAX) 500 mg in 250mL NS IVPB (has no administration in time range)   dexamethasone sodium phosphate injection 6 mg (6 mg Intravenous Given 9/19/20 0937)   acetaminophen (TYLENOL) tablet 1,000 mg (1,000 mg Oral Given 9/19/20 0939)                                            MDM  Number of Diagnoses or Management Options  COVID-19 virus infection:   Fever in adult:   Hypoxia:   Diagnosis management comments: Comorbidities: COPD  Differentials: Pneumonia, hypoxia, sepsis, COPD exacerbation;this list is not all inclusive and does not constitute the entirety of considered causes  Discussion with provider:  Radiology interpretation: X-rays reviewed by me and interpreted by radiologist: As above  Lab interpretation: Labs viewed by me significant for: As above    Patient is pleasant continue cardiac monitor.  She had IV established.  She had labs, EKG and chest x-ray obtained.  Her initial room air O2 saturation was noted to be 85 to 86%.  She was placed on nasal cannula at 2 L with minimal improvement.  It was increased to 5 L.  Her O2 saturation improved to 92 to 93% on this.  She was given Decadron IV.  She had blood cultures obtained as well as POC lactates be  normal at 1.0.  She was given Tylenol for fever.  She had ABG obtained was found to be as follows: pH 7.47, PCO2 31.8, PaO2 76.9, bicarb 23.2.  This was after the patient had been on 5 L nasal cannula for approximately 30 minutes.    Chest x-ray reveals multifocal pneumonia.  Patient started on IV Rocephin and Zithromax.    She will be admitted to hospital for further observation and treatment.  She was discussed with hospitalist.    Appropriate PPE was worn throughout patient exam/encounter.       Amount and/or Complexity of Data Reviewed  Clinical lab tests: ordered and reviewed  Tests in the radiology section of CPT®: ordered and reviewed    Patient Progress  Patient progress: stable      Final diagnoses:   COVID-19 virus infection   Hypoxia   Fever in adult   Pneumonia of both lungs due to infectious organism, unspecified part of lung            Steffany Mcmanus APRN  09/19/20 1057       Steffany Mcmanus APRN  09/19/20 1101      Electronically signed by Reid Pollock MD at 09/19/20 1337     Kacy Duarte, RN at 09/19/20 0935        RT called for ABG per NP Andressa request.     Kacy Duarte RN  09/19/20 0941       Kacy Duarte RN  09/19/20 0941      Electronically signed by Kacy Duarte RN at 09/19/20 0941     Garima Champion RegSched Rep at 09/19/20 1005        Notified staff that patient needs a recollect on their green tube d/t hemolysis     Garima Champion RegSched Rep  09/19/20 1005      Electronically signed by Garima Champion RegSched Rep at 09/19/20 1005     Cari Kamara RN at 09/19/20 1147        Patient reporting nausea. NP notified     Cari Kamara RN  09/19/20 1148      Electronically signed by Cari Kamara RN at 09/19/20 1148          Physician Progress Notes (last 7 days) (Notes from 09/14/20 0825 through 09/21/20 0825)      Alex Leonard MD at 09/20/20 0951                [facility] Medicine Services Daily Progress Note      Hospitalist Team  LOS 1 days      Patient  Care Team:  Polly Domingo MD as PCP - General (Family Medicine)  Bess Serrato MD as PCP - Claims Attributed  Bess Serrato MD as Consulting Physician (Pulmonary Disease)    Patient Location: 201/1      Subjective   Subjective     Chief Complaint / Subjective  Chief Complaint   Patient presents with   • Fever         Brief Synopsis of Hospital Course/HPI  Patient is a 65-year-old female with history of COPD on home oxygen 2.5 L at bedtime.  Presented to the emergency room on 9/20/2020 with complaints of cough productive of brownish sputum, progressively worsening shortness of breath and body aches.  Patient stated symptoms started about 10 days ago  and she had gone to an outlying urgent care facility where she was diagnosed with COVID-19 on 9/11/2020.  Since then her symptoms continue to worsen and she now has to use her home oxygen continuously.  Related having subjective fever and chills  Of note is that her  is also positive for COVID-19.  In the ED, temperature was 101.6 and patient 's oxygen saturation was 86% on room air improved to 93% with 5 L of oxygen via nasal cannula.  Chest x-ray done showed worsening multifocal pneumonia.     Patient was admitted for further care.      Date::    9/20/2020  Patient seen and examined  Shortness of breath continues  Currently on nonrebreather mask 11 L and saturating mid 90s    Review of Systems   Constitution: Negative for chills and fever.   HENT: Negative for congestion.    Eyes: Negative for blurred vision.   Cardiovascular: Negative for chest pain.   Respiratory: Positive for cough and shortness of breath.    Endocrine: Negative for cold intolerance and heat intolerance.   Gastrointestinal: Negative for abdominal pain, nausea and vomiting.   Genitourinary: Negative for dysuria.   Neurological: Positive for weakness.   Psychiatric/Behavioral: Negative for altered mental status.         Objective   Objective      Vital Signs  Temp:  [96 °F (35.6  "°C)-101.6 °F (38.7 °C)] 97.3 °F (36.3 °C)  Heart Rate:  [] 88  Resp:  [17-25] 20  BP: (101-146)/(64-81) 118/66  Oxygen Therapy  SpO2: 93 %  Pulse Oximetry Type: Continuous  Device (Oxygen Therapy): nonrebreather mask  Flow (L/min): 11  Flowsheet Rows      First Filed Value   Admission Height  162.6 cm (64\") Documented at 09/19/2020 0912   Admission Weight  101 kg (221 lb 9 oz) Documented at 09/19/2020 0912        Intake & Output (last 3 days)       09/17 0701 - 09/18 0700 09/18 0701 - 09/19 0700 09/19 0701 - 09/20 0700 09/20 0701 - 09/21 0700    P.O.   120 240    Total Intake(mL/kg)   120 (1.2) 240 (2.4)    Net   +120 +240            Urine Unmeasured Occurrence    2 x    Stool Unmeasured Occurrence   1 x         Lines, Drains & Airways    Active LDAs     Name:   Placement date:   Placement time:   Site:   Days:    Peripheral IV 09/19/20 0939 Right Antecubital   09/19/20 0939    Antecubital   1                  Physical Exam:    Physical Exam  Vitals signs reviewed.   Constitutional:       Appearance: She is ill-appearing.   HENT:      Head: Normocephalic and atraumatic.      Nose: Nose normal.   Eyes:      Extraocular Movements: Extraocular movements intact.      Conjunctiva/sclera: Conjunctivae normal.      Pupils: Pupils are equal, round, and reactive to light.   Neck:      Musculoskeletal: Neck supple.   Cardiovascular:      Rate and Rhythm: Normal rate and regular rhythm.   Pulmonary:      Effort: Pulmonary effort is normal.      Comments: Decreased air entry at the bases  Abdominal:      General: Bowel sounds are normal.      Palpations: Abdomen is soft.      Tenderness: There is no abdominal tenderness.   Musculoskeletal: Normal range of motion.   Skin:     General: Skin is warm and dry.   Neurological:      Mental Status: She is alert and oriented to person, place, and time.   Psychiatric:         Mood and Affect: Mood normal.               Procedures:              Results Review:     I reviewed the " patient's new clinical results.      Lab Results (last 24 hours)     Procedure Component Value Units Date/Time    Blood Culture - Blood, Arm, Right [[id number]] Collected: 09/19/20 0937    Specimen: Blood from Arm, Right Updated: 09/20/20 0945     Blood Culture No growth at 24 hours    BUN [[id number]]  (Normal) Collected: 09/20/20 0322    Specimen: Blood Updated: 09/20/20 0711     BUN 10 mg/dL     POC Glucose Once [[id number]]  (Abnormal) Collected: 09/20/20 0701    Specimen: Blood Updated: 09/20/20 0702     Glucose 120 mg/dL      Comment: Serial Number: [id number]:  [id number]       Ferritin [[id number]]  (Abnormal) Collected: 09/20/20 0322    Specimen: Blood Updated: 09/20/20 0446     Ferritin 1,797.00 ng/mL     Narrative:      Results may be falsely decreased if patient taking Biotin.      Comprehensive Metabolic Panel [[id number]]  (Abnormal) Collected: 09/20/20 0322    Specimen: Blood Updated: 09/20/20 0442     Glucose 129 mg/dL      BUN --     Comment: Testing performed by alternate method        Creatinine 0.53 mg/dL      Sodium 138 mmol/L      Potassium 3.9 mmol/L      Chloride 102 mmol/L      CO2 26.0 mmol/L      Calcium 7.9 mg/dL      Total Protein 5.8 g/dL      Albumin 3.10 g/dL      ALT (SGPT) 45 U/L      AST (SGOT) 63 U/L      Alkaline Phosphatase 64 U/L      Total Bilirubin 0.2 mg/dL      eGFR Non African Amer 116 mL/min/1.73      Globulin 2.7 gm/dL      A/G Ratio 1.1 g/dL      BUN/Creatinine Ratio --     Comment: Testing not performed        Anion Gap 10.0 mmol/L     Narrative:      GFR Normal >60  Chronic Kidney Disease <60  Kidney Failure <15      D-dimer, Quantitative [[id number]]  (Normal) Collected: 09/20/20 0322    Specimen: Blood Updated: 09/20/20 0424     D-Dimer, Quantitative 0.53 mg/L (FEU)     Narrative:      Reference Range  --------------------------------------------------------------------     < 0.50   Negative Predictive Value  0.50-0.59   Indeterminate    >= 0.60   Probable VTE              A very low percentage of patients with DVT may yield D-Dimer results   below the cut-off of 0.50 mg/L FEU.  This is known to be more   prevalent in patients with distal DVT.             Results of this test should always be interpreted in conjunction with   the patient's medical history, clinical presentation and other   findings.  Clinical diagnosis should not be based on the result of   INNOVANCE D-Dimer alone.    CBC Auto Differential [[id number]]  (Abnormal) Collected: 09/20/20 0322    Specimen: Blood Updated: 09/20/20 0414     WBC 3.50 10*3/mm3      RBC 4.43 10*6/mm3      Hemoglobin 14.0 g/dL      Hematocrit 41.2 %      MCV 93.0 fL      MCH 31.5 pg      MCHC 33.9 g/dL      RDW 13.7 %      RDW-SD 45.1 fl      MPV 8.0 fL      Platelets 207 10*3/mm3      Neutrophil % 70.4 %      Lymphocyte % 18.6 %      Monocyte % 10.8 %      Eosinophil % 0.0 %      Basophil % 0.2 %      Neutrophils, Absolute 2.50 10*3/mm3      Lymphocytes, Absolute 0.70 10*3/mm3      Monocytes, Absolute 0.40 10*3/mm3      Eosinophils, Absolute 0.00 10*3/mm3      Basophils, Absolute 0.00 10*3/mm3      nRBC 0.1 /100 WBC     POC Glucose Once [[id number]]  (Abnormal) Collected: 09/19/20 2142    Specimen: Blood Updated: 09/19/20 2143     Glucose 148 mg/dL      Comment: Serial Number: [id number]:  [id number]       Respiratory Panel, PCR - Swab, Nasopharynx [[id number]]  (Normal) Collected: 09/19/20 1504    Specimen: Swab from Nasopharynx Updated: 09/19/20 1651     ADENOVIRUS, PCR Not Detected     Coronavirus 229E Not Detected     Coronavirus HKU1 Not Detected     Coronavirus NL63 Not Detected     Coronavirus OC43 Not Detected     Human Metapneumovirus Not Detected     Human Rhinovirus/Enterovirus Not Detected     Influenza B PCR Not Detected     Parainfluenza Virus 1 Not Detected     Parainfluenza Virus 2 Not Detected     Parainfluenza Virus 3 Not Detected     Parainfluenza Virus 4 Not Detected     Bordetella pertussis pcr Not Detected      "Influenza A H1 2009 PCR Not Detected     Chlamydophila pneumoniae PCR Not Detected     Mycoplasma pneumo by PCR Not Detected     Influenza A PCR Not Detected     Influenza A H3 Not Detected     Influenza A H1 Not Detected     RSV, PCR Not Detected     Bordetella parapertussis PCR Not Detected    Narrative:      The coronavirus on the RVP is NOT COVID-19 and is NOT indicative of infection with COVID-19.     POC Glucose Once [[id number]]  (Abnormal) Collected: 09/19/20 1559    Specimen: Blood Updated: 09/19/20 1600     Glucose 143 mg/dL      Comment: Serial Number: [id number]:  [id number]       BUN [[id number]]  (Abnormal) Collected: 09/19/20 1009    Specimen: Blood Updated: 09/19/20 1115     BUN 7 mg/dL     Procalcitonin [[id number]]  (Abnormal) Collected: 09/19/20 1009    Specimen: Blood Updated: 09/19/20 1106     Procalcitonin 0.55 ng/mL     Narrative:      As a Marker for Sepsis (Non-Neonates):   1. <0.5 ng/mL represents a low risk of severe sepsis and/or septic shock.  1. >2 ng/mL represents a high risk of severe sepsis and/or septic shock.    As a Marker for Lower Respiratory Tract Infections that require antibiotic therapy:  PCT on Admission     Antibiotic Therapy             6-12 Hrs later  > 0.5                Strongly Recommended            >0.25 - <0.5         Recommended  0.1 - 0.25           Discouraged                   Remeasure/reassess PCT  <0.1                 Strongly Discouraged          Remeasure/reassess PCT      As 28 day mortality risk marker: \"Change in Procalcitonin Result\" (> 80 % or <=80 %) if Day 0 (or Day 1) and Day 4 values are available. Refer to http://www.[employer]-pct-calculator.com/   Change in PCT <=80 %   A decrease of PCT levels below or equal to 80 % defines a positive change in PCT test result representing a higher risk for 28-day all-cause mortality of patients diagnosed with severe sepsis or septic shock.  Change in PCT > 80 %   A decrease of PCT levels of more than 80 % " defines a negative change in PCT result representing a lower risk for 28-day all-cause mortality of patients diagnosed with severe sepsis or septic shock.                Results may be falsely decreased if patient taking Biotin.     Comprehensive Metabolic Panel [[id number]]  (Abnormal) Collected: 09/19/20 1009    Specimen: Blood Updated: 09/19/20 1104     Glucose 118 mg/dL      BUN --     Comment: Testing performed by alternate method        Creatinine 0.57 mg/dL      Sodium 133 mmol/L      Potassium 3.6 mmol/L      Chloride 97 mmol/L      CO2 25.0 mmol/L      Calcium 8.0 mg/dL      Total Protein 5.9 g/dL      Albumin 3.30 g/dL      ALT (SGPT) 45 U/L      AST (SGOT) 67 U/L      Alkaline Phosphatase 61 U/L      Total Bilirubin 0.3 mg/dL      eGFR Non African Amer 107 mL/min/1.73      Globulin 2.6 gm/dL      A/G Ratio 1.3 g/dL      BUN/Creatinine Ratio --     Comment: Testing not performed        Anion Gap 11.0 mmol/L     Narrative:      GFR Normal >60  Chronic Kidney Disease <60  Kidney Failure <15      Lactate Dehydrogenase [[id number]]  (Abnormal) Collected: 09/19/20 1009    Specimen: Blood Updated: 09/19/20 1104      U/L     C-reactive Protein [[id number]]  (Abnormal) Collected: 09/19/20 1009    Specimen: Blood Updated: 09/19/20 1104     C-Reactive Protein 11.34 mg/dL     Troponin [[id number]]  (Normal) Collected: 09/19/20 1009    Specimen: Blood Updated: 09/19/20 1104     Troponin T <0.010 ng/mL     Narrative:      Troponin T Reference Range:  <= 0.03 ng/mL-   Negative for AMI  >0.03 ng/mL-     Abnormal for myocardial necrosis.  Clinicians would have to utilize clinical acumen, EKG, Troponin and serial changes to determine if it is an Acute Myocardial Infarction or myocardial injury due to an underlying chronic condition.       Results may be falsely decreased if patient taking Biotin.      Blood Gas, Arterial [[id number]]  (Abnormal) Collected: 09/19/20 0959    Specimen: Arterial Blood Updated: 09/19/20 1002       Site Right Radial     Blair's Test Positive     pH, Arterial 7.471 pH units      pCO2, Arterial 31.8 mm Hg      pO2, Arterial 76.9 mm Hg      HCO3, Arterial 23.2 mmol/L      Base Excess, Arterial 0.4 mmol/L      Comment: Serial Number: [id number]:  [id number]        O2 Saturation, Arterial 96.2 %      CO2 Content 24.2 mmol/L      Barometric Pressure for Blood Gas --     Comment: N/A        Modality Cannula     FIO2 40 %      Hemodilution No    CBC & Differential [[id number]]  (Abnormal) Collected: 09/19/20 0937    Specimen: Blood Updated: 09/19/20 1000    Narrative:      The following orders were created for panel order CBC & Differential.  Procedure                               Abnormality         Status                     ---------                               -----------         ------                     CBC Auto Differential[[id number]]        Abnormal            Final result                 Please view results for these tests on the individual orders.    CBC Auto Differential [[id number]]  (Abnormal) Collected: 09/19/20 0937    Specimen: Blood Updated: 09/19/20 1000     WBC 6.20 10*3/mm3      RBC 4.65 10*6/mm3      Hemoglobin 14.7 g/dL      Hematocrit 43.2 %      MCV 92.9 fL      MCH 31.5 pg      MCHC 34.0 g/dL      RDW 14.0 %      RDW-SD 45.1 fl      MPV 8.4 fL      Platelets 204 10*3/mm3      Neutrophil % 84.3 %      Lymphocyte % 10.2 %      Monocyte % 5.2 %      Eosinophil % 0.0 %      Basophil % 0.3 %      Neutrophils, Absolute 5.20 10*3/mm3      Lymphocytes, Absolute 0.60 10*3/mm3      Monocytes, Absolute 0.30 10*3/mm3      Eosinophils, Absolute 0.00 10*3/mm3      Basophils, Absolute 0.00 10*3/mm3      nRBC 0.0 /100 WBC         No results found for: HGBA1C        Results from last 7 days   Lab Units 09/19/20  0959   PH, ARTERIAL pH units 7.471*   PO2 ART mm Hg 76.9*   PCO2, ARTERIAL mm Hg 31.8*   HCO3 ART mmol/L 23.2     No results found for: LIPASE  No results found for: CHOL, CHLPL, TRIG, HDL, LDL,  LDLDIRECT    No results found for: INTRAOP, PREDX, FINALDX, COMDX    Microbiology Results (last 10 days)     Procedure Component Value - Date/Time    Respiratory Panel, PCR - Swab, Nasopharynx [[id number]]  (Normal) Collected: 09/19/20 1504    Lab Status: Final result Specimen: Swab from Nasopharynx Updated: 09/19/20 1651     ADENOVIRUS, PCR Not Detected     Coronavirus 229E Not Detected     Coronavirus HKU1 Not Detected     Coronavirus NL63 Not Detected     Coronavirus OC43 Not Detected     Human Metapneumovirus Not Detected     Human Rhinovirus/Enterovirus Not Detected     Influenza B PCR Not Detected     Parainfluenza Virus 1 Not Detected     Parainfluenza Virus 2 Not Detected     Parainfluenza Virus 3 Not Detected     Parainfluenza Virus 4 Not Detected     Bordetella pertussis pcr Not Detected     Influenza A H1 2009 PCR Not Detected     Chlamydophila pneumoniae PCR Not Detected     Mycoplasma pneumo by PCR Not Detected     Influenza A PCR Not Detected     Influenza A H3 Not Detected     Influenza A H1 Not Detected     RSV, PCR Not Detected     Bordetella parapertussis PCR Not Detected    Narrative:      The coronavirus on the RVP is NOT COVID-19 and is NOT indicative of infection with COVID-19.     Blood Culture - Blood, Arm, Right [[id number]] Collected: 09/19/20 0937    Lab Status: Preliminary result Specimen: Blood from Arm, Right Updated: 09/20/20 0945     Blood Culture No growth at 24 hours    COVID-19,[facility],NP/OP Swab in Transport Media or ESwab 72 HR TAT - Swab, Nasopharynx [[id number]]  (Abnormal) Collected: 09/10/20 1313    Lab Status: Final result Specimen: Swab from Nasopharynx Updated: 09/12/20 2207    Narrative:      The following orders were created for panel order COVID-19,[facility],NP/OP Swab in Transport Media or ESwab 72 HR TAT - Swab, Nasopharynx.  Procedure                               Abnormality         Status                     ---------                               -----------          ------                     COVID-19,[facility] ROUTINE...[[id number]]  Abnormal            Final result                 Please view results for these tests on the individual orders.    COVID-19,[facility] ROUTINE, NP/OP SWAB IN TRANSPORT MEDIA OR ESWAB 72 HR TAT - Swab, Nasopharynx [[id number]]  (Abnormal) Collected: 09/10/20 1313    Lab Status: Final result Specimen: Swab from Nasopharynx Updated: 09/12/20 2207     SARS-CoV-2, MARILYN Detected     Comment: This nucleic acid amplification test was developed and its performance  characteristics determined by [facility]. Nucleic acid  amplification tests include PCR and TMA. This test has not been FDA  cleared or approved. This test has been authorized by FDA under an  Emergency Use Authorization (EUA). This test is only authorized for  the duration of time the declaration that circumstances exist  justifying the authorization of the emergency use of in vitro  diagnostic tests for detection of SARS-CoV-2 virus and/or diagnosis  of COVID-19 infection under section 564(b)(1) of the Act, 21 U.S.C.  360bbb-3(b) (1), unless the authorization is terminated or revoked  sooner.  When diagnostic testing is negative, the possibility of a false  negative result should be considered in the context of a patient's  recent exposures and the presence of clinical signs and symptoms  consistent with COVID-19. An individual without symptoms of COVID-19  and who is not shedding SARS-CoV-2 virus would expect to have a  negative (not detected) result in this assay.       Narrative:      Performed at:   - [facility]  [address] [facility], [place]  [id number]  : Eliza Rose MD, Phone:  [phone]          ECG/EMG Results (most recent)     Procedure Component Value Units Date/Time    ECG 12 Lead [[id number]] Collected: 09/19/20 0944     Updated: 09/20/20 0831    Narrative:      HEART RATE= 110  bpm  RR Interval= 544  ms  IA Interval= 158  ms  P Horizontal Axis=  0  deg  P Front Axis= 61  deg  QRSD Interval= 84  ms  QT Interval= 319  ms  QRS Axis= 5  deg  T Wave Axis= 62  deg  - OTHERWISE NORMAL ECG -  Sinus tachycardia  No previous ECG available for comparison  Electronically Signed By: Reid Pollock (JAYNE) 20-Sep-2020 08:30:38  Date and Time of Study: 2020-09-19 09:44:09                    Xr Chest Ap    Result Date: 9/19/2020  Multifocal consolidation involving the right midlung and lung bases bilaterally consistent with multifocal pneumonia, worsened from 09/10/2020.  Electronically Signed By-Ramiro Villa On:9/19/2020 10:30 AM This report was finalized on [id number] by  Ramiro Villa, .          Xrays, labs reviewed personally by physician.    Medication Review:   I have reviewed the patient's current medication list      Scheduled Meds  azithromycin, 250 mg, Oral, Q24H  budesonide-formoterol, 2 puff, Inhalation, BID - RT  cetirizine, 10 mg, Oral, Daily  dexamethasone, 6 mg, Oral, Daily With Breakfast  enoxaparin, 40 mg, Subcutaneous, Q24H  INV [id number] remdesivir in NS IVPB, 100 mg, Intravenous, Q24H  sodium chloride, 10 mL, Intravenous, Q12H        Meds Infusions  Pharmacy Consult - Remdesivir,   Pharmacy Consult - Steroid Insulin Protocol,         Meds PRN  •  acetaminophen **OR** acetaminophen **OR** acetaminophen  •  albuterol sulfate HFA  •  aluminum-magnesium hydroxide-simethicone  •  melatonin  •  nitroglycerin  •  ondansetron **OR** ondansetron  •  Pharmacy Consult - Remdesivir  •  Pharmacy Consult - Steroid Insulin Protocol  •  [COMPLETED] Insert peripheral IV **AND** sodium chloride  •  sodium chloride        Assessment/Plan   Assessment/Plan     Active Hospital Problems    Diagnosis  POA   • COVID-19 virus infection [U07.1]  Yes      Resolved Hospital Problems   No resolved problems to display.       MEDICAL DECISION MAKING COMPLEXITY BY PROBLEM:     Multifocal pneumonia due to COVID-19 infection  Tested positive for COVID-19 on 09/10/2020   respiratory viral  panel insignificant  Chest x-ray reviewed-showed worsening of the focal pneumonia  on Decadron and remdesivir   Also started on azithromycin  Respiratory care with bronchodilator  Oxygen therapy and titration  Airborne and contact precaution initiated  Monitor inflammatory markers     Fever-most likely due to above  Analgesic as needed     Acute hypoxic respiratory failure on chronic respiratory failure  Currently on 11 L of oxygen via nonrebreather mask  Oxygen therapy and titration        DVT prophylaxis with Lovenox         Mechanical Order History:     None      Pharmalogical Order History:      Ordered     Dose Route Frequency Stop    09/19/20 1098  enoxaparin (LOVENOX) syringe 40 mg      40 mg SC Every 24 Hours --                  Code Status -   Code Status and Medical Interventions:   Ordered at: 09/19/20 1358     Level Of Support Discussed With:    Patient     Code Status:    CPR     Medical Interventions (Level of Support Prior to Arrest):    Full       This patient has been examined with appropriate PPE. 09/20/20        Discharge Planning  Pending clinical progress          Electronically signed by Alex Leonard MD, 09/20/20, 09:52 EDT.  [facility] Hospitalist Team        Electronically signed by Alex Leonard MD at 09/20/20 5297

## 2020-09-21 NOTE — CONSULTS
PULMONARY/ CRITICAL CARE/ SLEEP MEDICINE CONSULT NOTE        Patient Name:  Monisha Batista    :  1955    Medical Record:  [id number]    REQUESTING PHYSICIAN    Bess Serrato MD    PRIMARY CARE PHYSICIAN     Polly Domingo MD    REASON FOR CONSULTATION    Monisha Batista is a 65 y.o. female who was referred for consultation for COVID 19 PNA.    65-year-old female with history of COPD on home oxygen 2.5 L at bedtime.  Admitted on 2020 with complaints of cough productive of brownish sputum, progressively worsening shortness of breath, fever and body aches.  Patient stated symptoms started about 10 days ago  and she had gone to an outlying urgent care facility where she was diagnosed with COVID-19 on 2020.  Since then her symptoms continue to worsen and she now has to use her home oxygen continuously. her  is also positive for COVID-19.  In the ED, temperature was 101.6 and patient 's oxygen saturation was 86% on room air   Chest x-ray done showed worsening multifocal pneumonia.        REVIEW OF SYSTEMS    Constitutional:  + fever   Eyes:  Denies change in visual acuity   HENT:  Denies nasal congestion or sore throat   Respiratory:  + cough or shortness of breath   Cardiovascular:  Denies chest pain or edema   GI:  Denies abdominal pain, nausea, vomiting, bloody stools or diarrhea   :  Denies dysuria   Musculoskeletal:  Denies back pain or joint pain   Integument:  Denies rash   Neurologic:  Denies headache, focal weakness or sensory changes   Endocrine:  Denies polyuria or polydipsia   Lymphatic:  Denies swollen glands   Psychiatric:  Denies depression or anxiety     MEDICAL HISTORY    Past Medical History:   Diagnosis Date   • COPD (chronic obstructive pulmonary disease) (CMS/[facility])    • Sleep apnea         SURGICAL HISTORY    Past Surgical History:   Procedure Laterality Date   • TONSILLECTOMY     • TUBAL ABDOMINAL LIGATION          FAMILY HISTORY    No family history on  file.    SOCIAL HISTORY    Social History     Tobacco Use   • Smoking status: Former Smoker   • Smokeless tobacco: Never Used   • Tobacco comment: quit    Substance Use Topics   • Alcohol use: Never     Frequency: Never        ALLERGIES    Allergies   Allergen Reactions   • Sulfa Antibiotics Anaphylaxis       MEDICATIONS    Scheduled Meds:azithromycin, 250 mg, Oral, Q24H  budesonide-formoterol, 2 puff, Inhalation, BID - RT  cetirizine, 10 mg, Oral, Daily  dexamethasone, 6 mg, Oral, Daily With Breakfast  enoxaparin, 40 mg, Subcutaneous, Q24H  INV [id number] remdesivir in NS IVPB, 100 mg, Intravenous, Q24H  [START ON 2020] pantoprazole, 40 mg, Oral, Q AM  sodium chloride, 10 mL, Intravenous, Q12H      Continuous Infusions:Pharmacy Consult - Remdesivir,   Pharmacy Consult - Steroid Insulin Protocol,       PRN Meds:.•  acetaminophen **OR** acetaminophen **OR** acetaminophen  •  albuterol sulfate HFA  •  aluminum-magnesium hydroxide-simethicone  •  melatonin  •  nitroglycerin  •  ondansetron **OR** ondansetron  •  Pharmacy Consult - Remdesivir  •  Pharmacy Consult - Steroid Insulin Protocol  •  [COMPLETED] Insert peripheral IV **AND** sodium chloride  •  sodium chloride      PHYSICAL EXAM    tMax 24 hrs:  Temp (24hrs), Av.7 °F (35.9 °C), Min:94.8 °F (34.9 °C), Max:97.7 °F (36.5 °C)    Vitals Ranges:  Temp:  [94.8 °F (34.9 °C)-97.7 °F (36.5 °C)] 96.7 °F (35.9 °C)  Heart Rate:  [63-84] 63  Resp:  [20-26] 20  BP: (108-116)/(65-73) 108/65  Intake and Output Last 3 Shifts:  I/O last 3 completed shifts:  In: 600 [P.O.:600]  Out: 200 [Urine:200]    Intake/Output Summary (Last 24 hours) at 2020 1607  Last data filed at 2020 1300  Gross per 24 hour   Intake 720 ml   Output 300 ml   Net 420 ml       Constitutional:  Obese, no acute distress  Eyes:  PERRL, conjunctiva normal   HENT:  Atraumatic, external ears normal, nose normal, oropharynx moist, no pharyngeal exudates.   Neck- normal range of motion, no  tenderness, supple   Respiratory:  No respiratory distress, few rhonchi at bases   Cardiovascular:  Normal rate, normal rhythm, no edema  GI:  Soft, nondistended, normal bowel sounds, nontender, no organomegaly, no mass, no rebound, no guarding   :  No costovertebral angle tenderness   Musculoskeletal:  No edema, no tenderness, no deformities. Back- no tenderness  Integument:  Well hydrated, no rash   Lymphatic:  No lymphadenopathy noted   Neurologic:  Alert & oriented x 3, CN 2-12 normal, normal motor function, normal sensory function, no focal deficits noted   Psychiatric:  Speech and behavior appropriate     LABS    Lab Results (last 24 hours)     Procedure Component Value Units Date/Time    POC Glucose Once [[id number]]  (Abnormal) Collected: 09/21/20 1221    Specimen: Blood Updated: 09/21/20 1223     Glucose 155 mg/dL      Comment: Serial Number: [id number]:  [id number]       Lactate Dehydrogenase [[id number]]  (Abnormal) Collected: 09/21/20 0819    Specimen: Blood Updated: 09/21/20 1158      U/L      Comment: Specimen hemolyzed.  Results may be affected.       D-dimer, Quantitative [[id number]]  (Normal) Collected: 09/21/20 1022    Specimen: Blood Updated: 09/21/20 1112     D-Dimer, Quantitative 0.57 mg/L (FEU)     Narrative:      Reference Range  --------------------------------------------------------------------     < 0.50   Negative Predictive Value  0.50-0.59   Indeterminate    >= 0.60   Probable VTE             A very low percentage of patients with DVT may yield D-Dimer results   below the cut-off of 0.50 mg/L FEU.  This is known to be more   prevalent in patients with distal DVT.             Results of this test should always be interpreted in conjunction with   the patient's medical history, clinical presentation and other   findings.  Clinical diagnosis should not be based on the result of   IN[place]OV[place] D-Dimer alone.    BUN [[id number]]  (Normal) Collected: 09/21/20 0349    Specimen: Blood  Updated: 09/21/20 1101     BUN 16 mg/dL     Ferritin [[id number]]  (Abnormal) Collected: 09/21/20 0819    Specimen: Blood Updated: 09/21/20 1051     Ferritin 1,254.00 ng/mL     Narrative:      Results may be falsely decreased if patient taking Biotin.      Blood Culture - Blood, Arm, Left [[id number]] Collected: 09/19/20 0937    Specimen: Blood from Arm, Left Updated: 09/21/20 1000     Blood Culture No growth at 2 days    Blood Culture - Blood, Arm, Right [[id number]] Collected: 09/19/20 0937    Specimen: Blood from Arm, Right Updated: 09/21/20 1000     Blood Culture No growth at 2 days    Basic Metabolic Panel [[id number]]  (Abnormal) Collected: 09/21/20 0819    Specimen: Blood Updated: 09/21/20 0927     Glucose 132 mg/dL      BUN --     Comment: Testing performed by alternate method        Creatinine 0.63 mg/dL      Sodium 140 mmol/L      Potassium 4.0 mmol/L      Comment: Slight hemolysis detected by analyzer. Results may be affected.        Chloride 104 mmol/L      CO2 28.0 mmol/L      Calcium 7.9 mg/dL      eGFR Non African Amer 95 mL/min/1.73      BUN/Creatinine Ratio --     Comment: Testing not performed        Anion Gap 8.0 mmol/L     Narrative:      GFR Normal >60  Chronic Kidney Disease <60  Kidney Failure <15      BUN [[id number]] Collected: 09/21/20 0819    Specimen: Blood Updated: 09/21/20 0927    CBC & Differential [[id number]]  (Abnormal) Collected: 09/21/20 0819    Specimen: Blood Updated: 09/21/20 0910    Narrative:      The following orders were created for panel order CBC & Differential.  Procedure                               Abnormality         Status                     ---------                               -----------         ------                     CBC Auto Differential[[id number]]        Abnormal            Final result                 Please view results for these tests on the individual orders.    CBC Auto Differential [[id number]]  (Abnormal) Collected: 09/21/20 0819    Specimen: Blood  Updated: 09/21/20 0910     WBC 6.00 10*3/mm3      RBC 4.39 10*6/mm3      Hemoglobin 13.8 g/dL      Hematocrit 41.9 %      MCV 95.3 fL      MCH 31.3 pg      MCHC 32.9 g/dL      RDW 14.0 %      RDW-SD 46.4 fl      MPV 7.7 fL      Platelets 277 10*3/mm3      Neutrophil % 75.7 %      Lymphocyte % 13.3 %      Monocyte % 10.9 %      Eosinophil % 0.0 %      Basophil % 0.1 %      Neutrophils, Absolute 4.50 10*3/mm3      Lymphocytes, Absolute 0.80 10*3/mm3      Monocytes, Absolute 0.70 10*3/mm3      Eosinophils, Absolute 0.00 10*3/mm3      Basophils, Absolute 0.00 10*3/mm3      nRBC 0.1 /100 WBC     POC Glucose Once [[id number]]  (Abnormal) Collected: 09/21/20 0810    Specimen: Blood Updated: 09/21/20 0815     Glucose 133 mg/dL      Comment: Serial Number: [id number]:  [id number]       Comprehensive Metabolic Panel [[id number]]  (Abnormal) Collected: 09/21/20 0349    Specimen: Blood Updated: 09/21/20 0704     Glucose 141 mg/dL      BUN --     Comment: Testing performed by alternate method        Creatinine 0.59 mg/dL      Sodium 140 mmol/L      Potassium 4.2 mmol/L      Comment: Slight hemolysis detected by analyzer. Results may be affected.        Chloride 104 mmol/L      CO2 26.0 mmol/L      Calcium 7.9 mg/dL      Total Protein 5.4 g/dL      Albumin 2.90 g/dL      ALT (SGPT) 42 U/L      AST (SGOT) 52 U/L      Alkaline Phosphatase 64 U/L      Total Bilirubin 0.3 mg/dL      eGFR Non African Amer 103 mL/min/1.73      Globulin 2.5 gm/dL      A/G Ratio 1.2 g/dL      BUN/Creatinine Ratio --     Comment: Testing not performed        Anion Gap 10.0 mmol/L     Narrative:      GFR Normal >60  Chronic Kidney Disease <60  Kidney Failure <15      POC Glucose Once [[id number]]  (Abnormal) Collected: 09/20/20 1926    Specimen: Blood Updated: 09/20/20 1927     Glucose 146 mg/dL      Comment: Serial Number: [id number]:  [id number]       POC Glucose Once [[id number]]  (Abnormal) Collected: 09/20/20 1606    Specimen: Blood Updated:  09/20/20 1607     Glucose 170 mg/dL      Comment: Serial Number: [id number]:  [id number]            Microbiology Results (last 10 days)     Procedure Component Value - Date/Time    Respiratory Panel, PCR - Swab, Nasopharynx [[id number]]  (Normal) Collected: 09/19/20 1504    Lab Status: Final result Specimen: Swab from Nasopharynx Updated: 09/19/20 1651     ADENOVIRUS, PCR Not Detected     Coronavirus 229E Not Detected     Coronavirus HKU1 Not Detected     Coronavirus NL63 Not Detected     Coronavirus OC43 Not Detected     Human Metapneumovirus Not Detected     Human Rhinovirus/Enterovirus Not Detected     Influenza B PCR Not Detected     Parainfluenza Virus 1 Not Detected     Parainfluenza Virus 2 Not Detected     Parainfluenza Virus 3 Not Detected     Parainfluenza Virus 4 Not Detected     Bordetella pertussis pcr Not Detected     Influenza A H1 2009 PCR Not Detected     Chlamydophila pneumoniae PCR Not Detected     Mycoplasma pneumo by PCR Not Detected     Influenza A PCR Not Detected     Influenza A H3 Not Detected     Influenza A H1 Not Detected     RSV, PCR Not Detected     Bordetella parapertussis PCR Not Detected    Narrative:      The coronavirus on the RVP is NOT COVID-19 and is NOT indicative of infection with COVID-19.     Blood Culture - Blood, Arm, Left [[id number]] Collected: 09/19/20 0937    Lab Status: Preliminary result Specimen: Blood from Arm, Left Updated: 09/21/20 1000     Blood Culture No growth at 2 days    Blood Culture - Blood, Arm, Right [[id number]] Collected: 09/19/20 0937    Lab Status: Preliminary result Specimen: Blood from Arm, Right Updated: 09/21/20 1000     Blood Culture No growth at 2 days         CBC  Results from last 7 days   Lab Units 09/21/20  0819 09/20/20  0322 09/19/20  0937   WBC 10*3/mm3 6.00 3.50 6.20   RBC 10*6/mm3 4.39 4.43 4.65   HEMOGLOBIN g/dL 13.8 14.0 14.7   HEMATOCRIT % 41.9 41.2 43.2   MCV fL 95.3 93.0 92.9   PLATELETS 10*3/mm3 277 207 204       BMP  Results  from last 7 days   Lab Units 09/21/20  0819 09/21/20  0349 09/20/20  0322 09/19/20  1009   SODIUM mmol/L 140 140 138 133*   POTASSIUM mmol/L 4.0 4.2 3.9 3.6   CHLORIDE mmol/L 104 104 102 97*   CO2 mmol/L 28.0 26.0 26.0 25.0   BUN   --  16 10 7*   CREATININE mg/dL 0.63 0.59 0.53* 0.57   GLUCOSE mg/dL 132* 141* 129* 118*       CMP   Results from last 7 days   Lab Units 09/21/20  0819 09/21/20  0349 09/20/20  0322 09/19/20  1009   SODIUM mmol/L 140 140 138 133*   POTASSIUM mmol/L 4.0 4.2 3.9 3.6   CHLORIDE mmol/L 104 104 102 97*   CO2 mmol/L 28.0 26.0 26.0 25.0   BUN   --  16 10 7*   CREATININE mg/dL 0.63 0.59 0.53* 0.57   GLUCOSE mg/dL 132* 141* 129* 118*   ALBUMIN g/dL  --  2.90* 3.10* 3.30*   BILIRUBIN mg/dL  --  0.3 0.2 0.3   ALK PHOS U/L  --  64 64 61   AST (SGOT) U/L  --  52* 63* 67*   ALT (SGPT) U/L  --  42* 45* 45*       TROPONIN  Results from last 7 days   Lab Units 09/19/20  1009   TROPONIN T ng/mL <0.010       CoAg        Creatinine Clearance  Estimated Creatinine Clearance: 104.2 mL/min (by C-G formula based on SCr of 0.63 mg/dL).    ABG  Results from last 7 days   Lab Units 09/19/20  0959   PH, ARTERIAL pH units 7.471*   PCO2, ARTERIAL mm Hg 31.8*   PO2 ART mm Hg 76.9*   O2 SATURATION ART % 96.2   BASE EXCESS ART mmol/L 0.4     IMAGING & OTHER STUDIES    Imaging Results (Last 72 Hours)     Procedure Component Value Units Date/Time    XR Chest 1 View [[id number]] Collected: 09/21/20 1110     Updated: 09/21/20 1113    Narrative:      EXAMINATION: XR CHEST 1 VW-     DATE OF EXAM: 9/21/2020 11:02 AM     INDICATION: Per MD; U07.1-COVID-19; R09.02-Hypoxemia; R50.9-Fever,  unspecified; J18.9-Pneumonia, unspecified organism.     COMPARISON: Chest radiograph dated 9/19/2020     TECHNIQUE: Portable AP view of the chest was obtained.     FINDINGS:  The cardiomediastinal silhouette is within normal limits. There is  aortic arch atherosclerotic calcification. There are bilateral airspace  opacities within the inferior  right upper lobe and bilateral lung bases.  These appear slightly improved from the prior examination. There is no  significant pleural effusion or pneumothorax. There are degenerative  changes of the thoracic spine.       Impression:      1. Slight improvement of the bilateral airspace opacities likely  representing pneumonia.     Electronically Signed By-Flaquito Lewis On:9/21/2020 11:11 AM  This report was finalized on [id number] by  Flaquito Lewis, .    XR Chest AP [[id number]] Collected: 09/19/20 1029     Updated: 09/19/20 1032    Narrative:         DATE OF EXAM:   9/19/2020 10:10 AM     PROCEDURE:   XR CHEST AP-     INDICATIONS:   COVID Evaluation, Cough, Fever; U07.1-COVID-19; R09.02-Hypoxemia;  R50.9-Fever, unspecified     COMPARISON:  09/10/2020     TECHNIQUE:   Portable chest radiograph.     FINDINGS:    There are scattered multifocal areas of airspace disease involving the  right midlung and the lung bases bilaterally which are worsened from the  prior study. No pneumothorax. No large pleural effusion. Aortic arch  atherosclerosis. Osseous structures intact.       Impression:      Multifocal consolidation involving the right midlung and lung bases  bilaterally consistent with multifocal pneumonia, worsened from  09/10/2020.     Electronically Signed By-Ramiro Villa On:9/19/2020 10:30 AM  This report was finalized on [id number] by  Ramiro Villa, .          ASSESSMENT      Severe COVID-19 virus infection  - Increase Dexamethasone to BID given worsening severe hypoxia.   - Continue Remdesivir.  - Give Convalescent plasma.  - Continue Lovenox.     Multifocal PNA- HCAP  - Elevated procalcitonin.   - Start Cefepime empirically.  - Check sputum cx and MRSA nares.    Acute hypoxic respiratpory failure  - Continue O2 supplementation to keep O2 sat >92%  - Check BNP     DVT ppy Lovenox    The FDA has authorized the emergency use of COVID-19 convalescent plasma, which is not an FDA approved biological  product. Discussions with the patient/patient caregiver regarding the risks and benefits of COVID-19 convalescent plasma have occurred. The patient/patient caregiver verbalizes recognition that this is an investigational treatment which may offer significant known and potential benefits and risks, the extent of which are unknown. Information on available alternative treatments and the risks and benefits of those alternatives was discussed. “Fact Sheet for Patients and Parents/Caregivers” was discussed with the patient/patient caregiver. All questions were answered to satisfaction and the patient/patient caregiver has the option to accept or refuse administration of COVID-19 convalescent plasma and would like to accept treatment.      Critically ill CC time 32 min   I thank you for this opportunity to take part in this patient's care and will follow the patient along with you.

## 2020-09-21 NOTE — PROGRESS NOTES
[facility] Daily Progress Note      Hospitalist Team  LOS 2 days      Patient Care Team:  Polly Domingo MD as PCP - General (Family Medicine)  Bess Serrato MD as PCP - Claims Attributed  Bess Serrato MD as Consulting Physician (Pulmonary Disease)    Patient Location: 201/1      Subjective   Subjective     Chief Complaint / Subjective  Chief Complaint   Patient presents with   • Fever         Brief Synopsis of Hospital Course/HPI  Patient is a 65-year-old female with history of COPD on home oxygen 2.5 L at bedtime.  Presented to the emergency room on 9/20/2020 with complaints of cough productive of brownish sputum, progressively worsening shortness of breath and body aches.  Patient stated symptoms started about 10 days ago  and she had gone to an outlying urgent care facility where she was diagnosed with COVID-19 on 9/11/2020.  Since then her symptoms continue to worsen and she now has to use her home oxygen continuously.  Related having subjective fever and chills  Of note is that her  is also positive for COVID-19.  In the ED, temperature was 101.6 and patient 's oxygen saturation was 86% on room air improved to 93% with 5 L of oxygen via nasal cannula.  Chest x-ray done showed worsening multifocal pneumonia.     Patient was admitted for further care.      Date::    9/20/2020  Patient seen and examined  Shortness of breath continues  Currently on nonrebreather mask 11 L and saturating mid 90s    9/21/2020  Patient reports that she feels better but her oxygen requirements are increasing up to 15 L.  Repeat chest x-ray ordered and pulmonologist consulted.  May benefit from convalescent plasma/Actemra.    Review of Systems   All other systems reviewed and negative      Objective   Objective      Vital Signs  Temp:  [94.8 °F (34.9 °C)-97.7 °F (36.5 °C)] 94.8 °F (34.9 °C)  Heart Rate:  [66-87] 74  Resp:  [18-26] 26  BP: (107-116)/(62-76) 115/67  Oxygen  "Therapy  SpO2: 95 %  Pulse Oximetry Type: Continuous  Device (Oxygen Therapy): nonrebreather mask  Flow (L/min): 15  Flowsheet Rows      First Filed Value   Admission Height  162.6 cm (64\") Documented at 09/19/2020 0912   Admission Weight  101 kg (221 lb 9 oz) Documented at 09/19/2020 0912        Intake & Output (last 3 days)       09/18 0701 - 09/19 0700 09/19 0701 - 09/20 0700 09/20 0701 - 09/21 0700 09/21 0701 - 09/22 0700    P.O.  120 600     Total Intake(mL/kg)  120 (1.2) 600 (5.9)     Urine (mL/kg/hr)   200 (0.1)     Total Output   200     Net  +120 +400             Urine Unmeasured Occurrence   2 x     Stool Unmeasured Occurrence  1 x          Lines, Drains & Airways    Active LDAs     Name:   Placement date:   Placement time:   Site:   Days:    Peripheral IV 09/19/20 0939 Right Antecubital   09/19/20 0939    Antecubital   1                  Physical Exam:    Physical Exam  Vitals signs reviewed.   Constitutional:       Comments: Moderate respiratory distress   HENT:      Head: Normocephalic and atraumatic.      Nose: Nose normal.   Eyes:      Extraocular Movements: Extraocular movements intact.      Conjunctiva/sclera: Conjunctivae normal.      Pupils: Pupils are equal, round, and reactive to light.   Neck:      Musculoskeletal: Neck supple.   Cardiovascular:      Rate and Rhythm: Normal rate and regular rhythm.   Pulmonary:      Effort: Pulmonary effort is normal.      Comments: Decreased air entry at the bases  Abdominal:      General: Bowel sounds are normal.      Palpations: Abdomen is soft.      Tenderness: There is no abdominal tenderness.   Musculoskeletal: Normal range of motion.   Skin:     General: Skin is warm and dry.   Neurological:      Mental Status: She is alert and oriented to person, place, and time.   Psychiatric:         Mood and Affect: Mood normal.               Procedures:              Results Review:     I reviewed the patient's new clinical results.      Lab Results (last 24 " hours)     Procedure Component Value Units Date/Time    Blood Culture - Blood, Arm, Left [[id number]] Collected: 09/19/20 0937    Specimen: Blood from Arm, Left Updated: 09/21/20 1000     Blood Culture No growth at 2 days    Blood Culture - Blood, Arm, Right [[id number]] Collected: 09/19/20 0937    Specimen: Blood from Arm, Right Updated: 09/21/20 1000     Blood Culture No growth at 2 days    Basic Metabolic Panel [[id number]]  (Abnormal) Collected: 09/21/20 0819    Specimen: Blood Updated: 09/21/20 0927     Glucose 132 mg/dL      BUN --     Comment: Testing performed by alternate method        Creatinine 0.63 mg/dL      Sodium 140 mmol/L      Potassium 4.0 mmol/L      Comment: Slight hemolysis detected by analyzer. Results may be affected.        Chloride 104 mmol/L      CO2 28.0 mmol/L      Calcium 7.9 mg/dL      eGFR Non African Amer 95 mL/min/1.73      BUN/Creatinine Ratio --     Comment: Testing not performed        Anion Gap 8.0 mmol/L     Narrative:      GFR Normal >60  Chronic Kidney Disease <60  Kidney Failure <15      BUN [[id number]] Collected: 09/21/20 0819    Specimen: Blood Updated: 09/21/20 0927    CBC & Differential [[id number]]  (Abnormal) Collected: 09/21/20 0819    Specimen: Blood Updated: 09/21/20 0910    Narrative:      The following orders were created for panel order CBC & Differential.  Procedure                               Abnormality         Status                     ---------                               -----------         ------                     CBC Auto Differential[[id number]]        Abnormal            Final result                 Please view results for these tests on the individual orders.    CBC Auto Differential [[id number]]  (Abnormal) Collected: 09/21/20 0819    Specimen: Blood Updated: 09/21/20 0910     WBC 6.00 10*3/mm3      RBC 4.39 10*6/mm3      Hemoglobin 13.8 g/dL      Hematocrit 41.9 %      MCV 95.3 fL      MCH 31.3 pg      MCHC 32.9 g/dL      RDW 14.0 %      RDW-SD 46.4  fl      MPV 7.7 fL      Platelets 277 10*3/mm3      Neutrophil % 75.7 %      Lymphocyte % 13.3 %      Monocyte % 10.9 %      Eosinophil % 0.0 %      Basophil % 0.1 %      Neutrophils, Absolute 4.50 10*3/mm3      Lymphocytes, Absolute 0.80 10*3/mm3      Monocytes, Absolute 0.70 10*3/mm3      Eosinophils, Absolute 0.00 10*3/mm3      Basophils, Absolute 0.00 10*3/mm3      nRBC 0.1 /100 WBC     POC Glucose Once [[id number]]  (Abnormal) Collected: 09/21/20 0810    Specimen: Blood Updated: 09/21/20 0815     Glucose 133 mg/dL      Comment: Serial Number: [id number]:  [id number]       Comprehensive Metabolic Panel [[id number]]  (Abnormal) Collected: 09/21/20 0349    Specimen: Blood Updated: 09/21/20 0704     Glucose 141 mg/dL      BUN --     Comment: Testing performed by alternate method        Creatinine 0.59 mg/dL      Sodium 140 mmol/L      Potassium 4.2 mmol/L      Comment: Slight hemolysis detected by analyzer. Results may be affected.        Chloride 104 mmol/L      CO2 26.0 mmol/L      Calcium 7.9 mg/dL      Total Protein 5.4 g/dL      Albumin 2.90 g/dL      ALT (SGPT) 42 U/L      AST (SGOT) 52 U/L      Alkaline Phosphatase 64 U/L      Total Bilirubin 0.3 mg/dL      eGFR Non African Amer 103 mL/min/1.73      Globulin 2.5 gm/dL      A/G Ratio 1.2 g/dL      BUN/Creatinine Ratio --     Comment: Testing not performed        Anion Gap 10.0 mmol/L     Narrative:      GFR Normal >60  Chronic Kidney Disease <60  Kidney Failure <15      BUN [[id number]] Collected: 09/21/20 0349    Specimen: Blood Updated: 09/21/20 0704    POC Glucose Once [[id number]]  (Abnormal) Collected: 09/20/20 1926    Specimen: Blood Updated: 09/20/20 1927     Glucose 146 mg/dL      Comment: Serial Number: [id number]:  [id number]       POC Glucose Once [[id number]]  (Abnormal) Collected: 09/20/20 1606    Specimen: Blood Updated: 09/20/20 1607     Glucose 170 mg/dL      Comment: Serial Number: [id number]:  [id number]       POC Glucose Once  [[id number]]  (Abnormal) Collected: 09/20/20 1118    Specimen: Blood Updated: 09/20/20 1120     Glucose 173 mg/dL      Comment: Serial Number: [id number]:  [id number]           No results found for: HGBA1C        Results from last 7 days   Lab Units 09/19/20  0959   PH, ARTERIAL pH units 7.471*   PO2 ART mm Hg 76.9*   PCO2, ARTERIAL mm Hg 31.8*   HCO3 ART mmol/L 23.2     No results found for: LIPASE  No results found for: CHOL, CHLPL, TRIG, HDL, LDL, LDLDIRECT    No results found for: INTRAOP, PREDX, FINALDX, COMDX    Microbiology Results (last 10 days)     Procedure Component Value - Date/Time    Respiratory Panel, PCR - Swab, Nasopharynx [[id number]]  (Normal) Collected: 09/19/20 1504    Lab Status: Final result Specimen: Swab from Nasopharynx Updated: 09/19/20 1651     ADENOVIRUS, PCR Not Detected     Coronavirus 229E Not Detected     Coronavirus HKU1 Not Detected     Coronavirus NL63 Not Detected     Coronavirus OC43 Not Detected     Human Metapneumovirus Not Detected     Human Rhinovirus/Enterovirus Not Detected     Influenza B PCR Not Detected     Parainfluenza Virus 1 Not Detected     Parainfluenza Virus 2 Not Detected     Parainfluenza Virus 3 Not Detected     Parainfluenza Virus 4 Not Detected     Bordetella pertussis pcr Not Detected     Influenza A H1 2009 PCR Not Detected     Chlamydophila pneumoniae PCR Not Detected     Mycoplasma pneumo by PCR Not Detected     Influenza A PCR Not Detected     Influenza A H3 Not Detected     Influenza A H1 Not Detected     RSV, PCR Not Detected     Bordetella parapertussis PCR Not Detected    Narrative:      The coronavirus on the RVP is NOT COVID-19 and is NOT indicative of infection with COVID-19.     Blood Culture - Blood, Arm, Left [[id number]] Collected: 09/19/20 0937    Lab Status: Preliminary result Specimen: Blood from Arm, Left Updated: 09/21/20 1000     Blood Culture No growth at 2 days    Blood Culture - Blood, Arm, Right [[id number]] Collected: 09/19/20  0937    Lab Status: Preliminary result Specimen: Blood from Arm, Right Updated: 09/21/20 1000     Blood Culture No growth at 2 days          ECG/EMG Results (most recent)     Procedure Component Value Units Date/Time    ECG 12 Lead [[id number]] Collected: 09/19/20 0944     Updated: 09/20/20 0831    Narrative:      HEART RATE= 110  bpm  RR Interval= 544  ms  TX Interval= 158  ms  P Horizontal Axis= 0  deg  P Front Axis= 61  deg  QRSD Interval= 84  ms  QT Interval= 319  ms  QRS Axis= 5  deg  T Wave Axis= 62  deg  - OTHERWISE NORMAL ECG -  Sinus tachycardia  No previous ECG available for comparison  Electronically Signed By: Reid Pollock (JAYNE) 20-Sep-2020 08:30:38  Date and Time of Study: 2020-09-19 09:44:09                    Xr Chest Ap    Result Date: 9/19/2020  Multifocal consolidation involving the right midlung and lung bases bilaterally consistent with multifocal pneumonia, worsened from 09/10/2020.  Electronically Signed By-Ramiro Villa On:9/19/2020 10:30 AM This report was finalized on [id number] by  Ramiro Villa, .          Xrays, labs reviewed personally by physician.    Medication Review:   I have reviewed the patient's current medication list      Scheduled Meds  azithromycin, 250 mg, Oral, Q24H  budesonide-formoterol, 2 puff, Inhalation, BID - RT  cetirizine, 10 mg, Oral, Daily  dexamethasone, 6 mg, Oral, Daily With Breakfast  enoxaparin, 40 mg, Subcutaneous, Q24H  INV [id number] remdesivir in NS IVPB, 100 mg, Intravenous, Q24H  sodium chloride, 10 mL, Intravenous, Q12H        Meds Infusions  Pharmacy Consult - Remdesivir,   Pharmacy Consult - Steroid Insulin Protocol,         Meds PRN  •  acetaminophen **OR** acetaminophen **OR** acetaminophen  •  albuterol sulfate HFA  •  aluminum-magnesium hydroxide-simethicone  •  melatonin  •  nitroglycerin  •  ondansetron **OR** ondansetron  •  Pharmacy Consult - Remdesivir  •  Pharmacy Consult - Steroid Insulin Protocol  •  [COMPLETED] Insert peripheral IV **AND**  sodium chloride  •  sodium chloride        Assessment/Plan   Assessment/Plan     Active Hospital Problems    Diagnosis  POA   • COVID-19 virus infection [U07.1]  Yes      Resolved Hospital Problems   No resolved problems to display.       MEDICAL DECISION MAKING COMPLEXITY BY PROBLEM:     Multifocal pneumonia due to COVID-19 infection  Tested positive for COVID-19 on 09/10/2020   respiratory viral panel insignificant  Chest x-ray reviewed-showed worsening of the focal pneumonia  on Decadron and remdesivir   Also started on azithromycin  Respiratory care with bronchodilator  Oxygen therapy and titration  Airborne and contact precaution initiated  Monitor inflammatory markers  Consult pulmonary repeat chest x-ray  May benefit from Actemra/convalescent plasma.  Blood cultures negative so far  Pending respiratory panel    Fever-most likely due to above  Resolved  Acute hypoxic respiratory failure on chronic respiratory failure  Currently on 11 L of oxygen via nonrebreather mask  Oxygen therapy and titration  Blood cultures negative so far     DVT prophylaxis with Lovenox     Obesity BMI 30.0    Mechanical Order History:     None      Pharmalogical Order History:      Ordered     Dose Route Frequency Stop    09/19/20 1358  enoxaparin (LOVENOX) syringe 40 mg      40 mg SC Every 24 Hours --                  Code Status -   Code Status and Medical Interventions:   Ordered at: 09/19/20 1358     Level Of Support Discussed With:    Patient     Code Status:    CPR     Medical Interventions (Level of Support Prior to Arrest):    Full       This patient has been examined with appropriate PPE. 09/21/20        Discharge Planning  Pending clinical progress          Electronically signed by Lenin Brooks MD, 09/21/20, 10:12 EDT.  [facility] Hospitalist Team

## 2020-09-21 NOTE — PAYOR COMM NOTE
"  CLINICAL UPDATE FOR   Monisha Wilder (65 y.o. Female)    1955  Auth # [id number]    PATIENT IS STILL INHOUSE AS OF 09/21/22020.    AUTHORIZATION PENDING:   PLEASE CALL OR FAX DETERMINATION TO CONTACT BELOW. THANK YOU.        Sharon Lackey, RN MSN  /UR  [facility]  [phone] office  [phone] fax  [email or web address]    [facility]  NPI: [id number]  Tax: 807-      Monisha Wilder (65 y.o. Female)    1955  Auth # [id number]    Date of Birth Social Security Number Address Home Phone MRN    1955  [facility] OFFICERS   [place] [address] [phone] [id number]    Church Marital Status          Non-Jainism        Admission Date Admission Type Admitting Provider Attending Provider Department, Room/Bed    9/19/20 Emergency Lenin Brooks MD Gad, George Fayez Labib Youssief, MD [facility] 2A PEDIATRICS, 201/1    Discharge Date Discharge Disposition Discharge Destination                       Attending Provider: Lenin Brooks MD    Allergies: Sulfa Antibiotics    Isolation: Enh Drop/Con   Infection: COVID (confirmed) (09/12/20)   Code Status: CPR    Ht: 162.6 cm (64\")   Wt: 101 kg (221 lb 11.2 oz)    Admission Cmt: None   Principal Problem: None                Active Insurance as of 9/19/2020     Primary Coverage     Payor Plan Insurance Group Employer/Plan Group    [facility] PPO [id number]     Payor Plan Address Payor Plan Phone Number Payor Plan Fax Number Effective Dates    [id number] [phone]  1/1/2020 - None Entered    [facility] [id number]       Subscriber Name Subscriber Birth Date Member ID       CHENTE TREVIÑO 1955 [id number]           Secondary Coverage     Payor Plan Insurance Group Employer/Plan Group    MEDICARE MEDICARE A & B      Payor Plan Address Payor Plan Phone Number Payor Plan Fax Number Effective Dates    [address] " [address] [phone]  2013 - None Entered    [facility] [address]       Subscriber Name Subscriber Birth Date Member ID       MONISHA MONROY 1955 [id number]                 Emergency Contacts      (Rel.) Home Phone Work Phone Mobile Phone    CHENTE TREVIÑO (Spouse) -- -- [phone]               History & Physical      Alex Leonard MD at 20 1327                [facility] Medicine Services      Patient Name: Monisha Treviño  : 1955  MRN: [id number]  Primary Care Physician: Polly Domingo MD  Date of admission: 2020    Patient Care Team:  Polly Domingo MD as PCP - General (Family Medicine)  Bess Serrato MD as PCP - Claims Attributed  Bess Serraot MD as Consulting Physician (Pulmonary Disease)          Subjective   History Present Illness     Chief Complaint:   Chief Complaint   Patient presents with   • Fever       Shortness of breath         History of Present Illness    Patient is a 65-year-old female with history of COPD on home oxygen 2.5 L at bedtime.  Presented to the emergency room on 2020 with complaints of cough productive of brownish sputum, progressively worsening shortness of breath and body aches.  Patient stated symptoms started about 10 days ago  and she had gone to an outlying urgent care facility where she was diagnosed with COVID-19 on 2020.  Since then her symptoms continue to worsen and she now has to use her home oxygen continuously.  Related having subjective fever and chills  Of note is that her  is also positive for COVID-19.  In the ED, temperature was 101.6 and patient 's oxygen saturation was 86% on room air improved to 93% with 5 L of oxygen via nasal cannula.  Chest x-ray done showed worsening multifocal pneumonia.    Patient was admitted for further care.    Review of Systems   Constitution: Positive for chills, fever and malaise/fatigue.   HENT: Positive for congestion.    Eyes:  Negative for blurred vision.   Cardiovascular: Negative for chest pain.   Respiratory: Positive for cough and shortness of breath. Negative for hemoptysis.    Endocrine: Negative for cold intolerance and heat intolerance.   Gastrointestinal: Positive for diarrhea and nausea. Negative for abdominal pain and vomiting.   Genitourinary: Negative for dysuria.   Neurological: Positive for weakness.   Psychiatric/Behavioral: Negative for altered mental status.           Personal History     Past Medical History:   Past Medical History:   Diagnosis Date   • COPD (chronic obstructive pulmonary disease) (CMS/HCC)    • Sleep apnea        Surgical History:      Past Surgical History:   Procedure Laterality Date   • TONSILLECTOMY     • TUBAL ABDOMINAL LIGATION             Family History: family history is not on file. Otherwise pertinent FHx was reviewed and unremarkable.     Social History:  reports that she has quit smoking. She has never used smokeless tobacco. She reports that she does not drink alcohol or use drugs.      Medications:  Prior to Admission medications    Medication Sig Start Date End Date Taking? Authorizing Provider   albuterol (PROVENTIL) (2.5 MG/3ML) 0.083% nebulizer solution Take 2.5 mg by nebulization Every 6 (Six) Hours As Needed for Wheezing or Shortness of Air.   Yes Provider, MD Angel   albuterol sulfate  (90 Base) MCG/ACT inhaler Inhale 2 puffs Every 4 (Four) Hours As Needed. 7/7/20  Yes Emergency, Nurse Harris RN   loratadine (CLARITIN) 10 MG tablet Take 10 mg by mouth every night at bedtime. 7/6/20  Yes Emergency, Nurse Epic, RN   TRELEGY ELLIPTA 100-62.5-25 MCG/INH aerosol powder  Inhale 1 puff Daily. 8/12/20  Yes Emergency, Nurse Epic, RN   brompheniramine-pseudoephedrine-DM 30-2-10 MG/5ML syrup Take 5 mL by mouth 4 (Four) Times a Day As Needed for Congestion or Cough. 9/10/20   Taj Belcher APRN   doxycycline (VIBRAMYCIN) 100 MG capsule Take 1 capsule by mouth 2 (Two) Times a  Day. 9/10/20   Taj Belcher APRN   albuterol sulfate HFA (ProAir HFA) 108 (90 Base) MCG/ACT inhaler PROAIR  (90 Base) MCG/ACT AERS 2/18/16 9/19/20  Emergency, Nurse Harris, RN       Allergies:    Allergies   Allergen Reactions   • Sulfa Antibiotics Anaphylaxis       Objective   Objective     Vital Signs  Temp:  [97.8 °F (36.6 °C)-101.6 °F (38.7 °C)] 97.8 °F (36.6 °C)  Heart Rate:  [101-119] 101  Resp:  [16-22] 22  BP: (101-146)/(68-85) 123/74  SpO2:  [86 %-95 %] 92 %  on  Flow (L/min):  [5] 5;   Device (Oxygen Therapy): nonrebreather mask  Body mass index is 38.03 kg/m².    Physical Exam  Vitals signs reviewed.   Constitutional:       General: She is not in acute distress.  HENT:      Head: Normocephalic and atraumatic.      Nose: Nose normal.   Eyes:      Extraocular Movements: Extraocular movements intact.      Pupils: Pupils are equal, round, and reactive to light.   Neck:      Musculoskeletal: Neck supple.   Cardiovascular:      Rate and Rhythm: Normal rate and regular rhythm.   Pulmonary:      Effort: No respiratory distress.      Comments: Decreased air entry at the bases  Abdominal:      General: Bowel sounds are normal.      Palpations: Abdomen is soft.      Tenderness: There is no abdominal tenderness.   Musculoskeletal: Normal range of motion.   Skin:     General: Skin is warm and dry.   Neurological:      General: No focal deficit present.      Mental Status: She is alert and oriented to person, place, and time.   Psychiatric:         Mood and Affect: Mood normal.         Results Review:  I have personally reviewed most recent blood work and imaging     Results from last 7 days   Lab Units 09/19/20  0937   WBC 10*3/mm3 6.20   HEMOGLOBIN g/dL 14.7   HEMATOCRIT % 43.2   PLATELETS 10*3/mm3 204     Results from last 7 days   Lab Units 09/19/20  1009 09/19/20  0935   SODIUM mmol/L 133*  --    POTASSIUM mmol/L 3.6  --    CHLORIDE mmol/L 97*  --    CO2 mmol/L 25.0  --    BUN  7*  --    CREATININE  mg/dL 0.57  --    GLUCOSE mg/dL 118*  --    CALCIUM mg/dL 8.0*  --    ALT (SGPT) U/L 45*  --    AST (SGOT) U/L 67*  --    TROPONIN T ng/mL <0.010  --    LACTATE mmol/L  --  1.0   PROCALCITONIN ng/mL 0.55*  --      Estimated Creatinine Clearance: 115.2 mL/min (by C-G formula based on SCr of 0.57 mg/dL).  Brief Urine Lab Results     None          Microbiology Results (last 10 days)     Procedure Component Value - Date/Time    COVID-19,[facility],NP/OP Swab in Transport Media or ESwab 72 HR TAT - Swab, Nasopharynx [[id number]]  (Abnormal) Collected: 09/10/20 1313    Lab Status: Final result Specimen: Swab from Nasopharynx Updated: 09/12/20 2207    Narrative:      The following orders were created for panel order COVID-19,[facility],NP/OP Swab in Transport Media or ESwab 72 HR TAT - Swab, Nasopharynx.  Procedure                               Abnormality         Status                     ---------                               -----------         ------                     COVID-19,[facility] ROUTINE...[[id number]]  Abnormal            Final result                 Please view results for these tests on the individual orders.    COVID-19,[facility] ROUTINE, NP/OP SWAB IN TRANSPORT MEDIA OR ESWAB 72 HR TAT - Swab, Nasopharynx [[id number]]  (Abnormal) Collected: 09/10/20 1313    Lab Status: Final result Specimen: Swab from Nasopharynx Updated: 09/12/20 2207     SARS-CoV-2, MARILYN Detected     Comment: This nucleic acid amplification test was developed and its performance  characteristics determined by [employer]. Nucleic acid  amplification tests include PCR and TMA. This test has not been FDA  cleared or approved. This test has been authorized by FDA under an  Emergency Use Authorization (EUA). This test is only authorized for  the duration of time the declaration that circumstances exist  justifying the authorization of the emergency use of in vitro  diagnostic tests for detection of SARS-CoV-2 virus and/or diagnosis  of  COVID-19 infection under section 564(b)(1) of the Act, 21 U.S.C.  360bbb-3(b) (1), unless the authorization is terminated or revoked  sooner.  When diagnostic testing is negative, the possibility of a false  negative result should be considered in the context of a patient's  recent exposures and the presence of clinical signs and symptoms  consistent with COVID-19. An individual without symptoms of COVID-19  and who is not shedding SARS-CoV-2 virus would expect to have a  negative (not detected) result in this assay.       Narrative:      Performed at:  01 - [facility]  [address], [place]  [id number]  : Eliza Rose MD, Phone:  [phone]          ECG/EMG Results (most recent)     Procedure Component Value Units Date/Time    ECG 12 Lead [[id number]] Collected: 09/19/20 0944     Updated: 09/19/20 0946    Narrative:      HEART RATE= 110  bpm  RR Interval= 544  ms  GA Interval= 158  ms  P Horizontal Axis= 0  deg  P Front Axis= 61  deg  QRSD Interval= 84  ms  QT Interval= 319  ms  QRS Axis= 5  deg  T Wave Axis= 62  deg  - OTHERWISE NORMAL ECG -  Sinus tachycardia  No previous ECG available for comparison  Electronically Signed By:   Date and Time of Study: 2020-09-19 09:44:09                    Xr Chest Ap    Result Date: 9/19/2020  Multifocal consolidation involving the right midlung and lung bases bilaterally consistent with multifocal pneumonia, worsened from 09/10/2020.  Electronically Signed By-Ramiro Villa On:9/19/2020 10:30 AM This report was finalized on [id number] by  Ramiro Villa, .        Estimated Creatinine Clearance: 115.2 mL/min (by C-G formula based on SCr of 0.57 mg/dL).    Assessment/Plan   Assessment/Plan       Active Hospital Problems    Diagnosis  POA   • COVID-19 virus infection [U07.1]  Yes      Resolved Hospital Problems   No resolved problems to display.     Multifocal pneumonia due to COVID-19 infection  Tested positive for COVID-19 on 09/10/2020  Will   check respiratory viral panel  Chest x-ray reviewed-showed worsening of the focal pneumonia  Will start on Decadron and remdesivir   Also started on azithromycin  Respiratory care with bronchodilator  Oxygen therapy and titration  Airborne and contact precaution initiated  Monitor inflammatory markers    Fever-most likely due to above  Analgesic as needed    Acute hypoxic respiratory failure on chronic respiratory failure  On oxygen via nasal cannula-currently on 5 L  Oxygen therapy and titration      DVT prophylaxis with Lovenox        Further recommendation following clinical course    The FDA has authorized the emergency use of remdesivir, which is not an FDA approved drug. Discussions with the patient/patient caregiver regarding the risks and benefits of remdesivir have occurred. The patient/patient caregiver verbalizes recognition that this is an investigational medication which may offer both benefits and risks, the extent of which are unknown. Information on available alternative treatments and the risks and benefits of those alternatives was discussed. “Fact Sheet for Patients and Parents/Caregivers” was discussed with the patient/patient caregiver. All questions were answered to satisfaction and the patient/patient caregiver would like to proceed treating with remdesivir.                Mechanical Order History:     None      Pharmalogical Order History:     None          CODE STATUS:    Code Status and Medical Interventions:   Ordered at: 09/19/20 3052     Level Of Support Discussed With:    Patient     Code Status:    CPR     Medical Interventions (Level of Support Prior to Arrest):    Full       This patient has been examined with appropriate PPE. 09/19/20      I discussed the patient's findings and my recommendations with patient's    Electronically signed by Alex Leonard MD, 09/19/20, 1:27 PM EDT.  [facility] Hospitalist Team          Electronically signed by Alex Leonard MD at 09/19/20 5542           Emergency Department Notes      Cari Kamara RN at 09/19/20 0921        Bodyaches, fever at home. Was diagnosed with covid about 1 week ago. History of COPD. Coughs only after breathing treatments     Cari Kamara RN  09/19/20 0922      Electronically signed by Cari Kamara RN at 09/19/20 0922     Steffany Mcmanus APRN at 09/19/20 0928     Attestation signed by Reid Pollock MD at 09/19/20 1337          For this patient encounter, I reviewed the NP or PA documentation, treatment plan, and medical decision making. Reid Pollock MD 9/19/2020 13:37 EDT                  Subjective   Patient is a 65-year-old obese white female with history of COPD who presents today with complaints of fever chills body aches cough congestion shortness of breath.  Patient states she normally only wears oxygen at night however she has been wearing it continuously for the last several days.  Patient states her symptoms started 10 days ago.  She states at the onset of her symptoms she was seen in urgent care and tested for COVID.  She states 6 days ago she received positive results.  She states her  also had COVID and thinks she contracted it from him.  She states in the last 24 to 48 hours her symptoms have worsened.  She denies any chest pain.  She denies any nausea or vomiting but does report a few nonbloody loose stools.  She denies any lower extremity pain or edema.  She denies any recent travel or prolonged immobilization.          Review of Systems   Constitutional: Positive for chills, fatigue and fever.   HENT: Positive for congestion. Negative for sore throat.    Respiratory: Positive for cough and shortness of breath.    Cardiovascular: Negative for chest pain and leg swelling.   Gastrointestinal: Positive for diarrhea. Negative for abdominal pain, nausea and vomiting.   Genitourinary: Negative for decreased urine volume and dysuria.   Musculoskeletal: Positive for myalgias.   Skin: Negative for  rash.   Neurological: Positive for weakness and headaches. Negative for dizziness and numbness.       Past Medical History:   Diagnosis Date   • COPD (chronic obstructive pulmonary disease) (CMS/[facility])        Allergies   Allergen Reactions   • Sulfa Antibiotics Anaphylaxis       Past Surgical History:   Procedure Laterality Date   • TONSILLECTOMY     • TUBAL ABDOMINAL LIGATION         No family history on file.    Social History     Socioeconomic History   • Marital status:      Spouse name: Not on file   • Number of children: Not on file   • Years of education: Not on file   • Highest education level: Not on file   Tobacco Use   • Smoking status: Former Smoker   • Smokeless tobacco: Never Used   • Tobacco comment: quit 2011   Substance and Sexual Activity   • Alcohol use: Never     Frequency: Never   • Drug use: Never           Objective   Physical Exam  Vital signs and triage nurse note reviewed.  Constitutional: Awake, alert; well-developed and well-nourished. No acute distress is noted.  HEENT: Normocephalic, atraumatic; pupils are PERRL with intact EOM; oropharynx is pink and moist without exudate or erythema.  No drooling or pooling of oral secretions.  Neck: Supple, full range of motion without pain; no cervical lymphadenopathy. Normal phonation.  Cardiovascular: Regular rate and rhythm, normal S1-S2.  No murmur noted.  Pulmonary: Respiratory effort regular nonlabored, mildly tachypneic, breath sounds bibasilar crackles.  No wheezes or rhonchi noted.  Abdomen: Soft, nontender, nondistended with normoactive bowel sounds; no rebound or guarding.  Musculoskeletal: Independent range of motion of all extremities with no palpable tenderness or edema.  Calves are symmetric and nontender.  Neuro: Alert oriented x3, speech is clear and appropriate, GCS 15.    Skin: Flesh tone, warm, dry, intact; no erythematous or petechial rash or lesion.      Procedures          ED Course  ED Course as of Sep 19 1101   Sat Sep  19, 2020   1052 EKG reviewed by me and interpreted by Dr. Pollock:  Sinus tachycardia with ventricular rate of 110.  No acute ST or T wave changes noted.     [MD]      ED Course User Index  [MD] Steffany Mcmanus APRN      Labs Reviewed   CBC WITH AUTO DIFFERENTIAL - Abnormal; Notable for the following components:       Result Value    Neutrophil % 84.3 (*)     Lymphocyte % 10.2 (*)     Eosinophil % 0.0 (*)     Lymphocytes, Absolute 0.60 (*)     All other components within normal limits   BLOOD GAS, ARTERIAL - Abnormal; Notable for the following components:    pH, Arterial 7.471 (*)     pCO2, Arterial 31.8 (*)     pO2, Arterial 76.9 (*)     All other components within normal limits   POC LACTATE - Normal   BLOOD CULTURE   BLOOD CULTURE   BLOOD GAS, ARTERIAL   COMPREHENSIVE METABOLIC PANEL   LACTATE DEHYDROGENASE   PROCALCITONIN   C-REACTIVE PROTEIN   TROPONIN (IN-HOUSE)   BUN   POC LACTATE   CBC AND DIFFERENTIAL    Narrative:     The following orders were created for panel order CBC & Differential.  Procedure                               Abnormality         Status                     ---------                               -----------         ------                     CBC Auto Differential[[id number]]        Abnormal            Final result                 Please view results for these tests on the individual orders.     Xr Chest Ap    Result Date: 9/19/2020  Multifocal consolidation involving the right midlung and lung bases bilaterally consistent with multifocal pneumonia, worsened from 09/10/2020.  Electronically Signed By-Ramiro Villa On:9/19/2020 10:30 AM This report was finalized on [id number] by  Ramiro Villa, .    Medications   sodium chloride 0.9 % flush 10 mL (has no administration in time range)   cefTRIAXone (ROCEPHIN) in SWFI 1 gram/10ml IV PUSH syringe (has no administration in time range)   azithromycin (ZITHROMAX) 500 mg in 250mL NS IVPB (has no administration in time range)   dexamethasone sodium  phosphate injection 6 mg (6 mg Intravenous Given 9/19/20 0937)   acetaminophen (TYLENOL) tablet 1,000 mg (1,000 mg Oral Given 9/19/20 0939)                                            MDM  Number of Diagnoses or Management Options  COVID-19 virus infection:   Fever in adult:   Hypoxia:   Diagnosis management comments: Comorbidities: COPD  Differentials: Pneumonia, hypoxia, sepsis, COPD exacerbation;this list is not all inclusive and does not constitute the entirety of considered causes  Discussion with provider:  Radiology interpretation: X-rays reviewed by me and interpreted by radiologist: As above  Lab interpretation: Labs viewed by me significant for: As above    Patient is pleasant continue cardiac monitor.  She had IV established.  She had labs, EKG and chest x-ray obtained.  Her initial room air O2 saturation was noted to be 85 to 86%.  She was placed on nasal cannula at 2 L with minimal improvement.  It was increased to 5 L.  Her O2 saturation improved to 92 to 93% on this.  She was given Decadron IV.  She had blood cultures obtained as well as POC lactates be normal at 1.0.  She was given Tylenol for fever.  She had ABG obtained was found to be as follows: pH 7.47, PCO2 31.8, PaO2 76.9, bicarb 23.2.  This was after the patient had been on 5 L nasal cannula for approximately 30 minutes.    Chest x-ray reveals multifocal pneumonia.  Patient started on IV Rocephin and Zithromax.    She will be admitted to hospital for further observation and treatment.  She was discussed with hospitalist.    Appropriate PPE was worn throughout patient exam/encounter.       Amount and/or Complexity of Data Reviewed  Clinical lab tests: ordered and reviewed  Tests in the radiology section of CPT®: ordered and reviewed    Patient Progress  Patient progress: stable      Final diagnoses:   COVID-19 virus infection   Hypoxia   Fever in adult   Pneumonia of both lungs due to infectious organism, unspecified part of lung               Yonathan Steffany, APRROSINA  09/19/20 1057       Yonathan Steffany, APRN  09/19/20 1101      Electronically signed by Reid Pollock MD at 09/19/20 1337     Kacy Duarte, RN at 09/19/20 0935        RT called for ABG per NP Andressa request.     Kacy Duarte, RN  09/19/20 0941       Kacy Duarte, RN  09/19/20 0941      Electronically signed by Kacy Duarte RN at 09/19/20 0941     Garima Champion RegSched Rep at 09/19/20 1005        Notified staff that patient needs a recollect on their green tube d/t hemolysis     Garima Champion RegAbhisheked Rep  09/19/20 1005      Electronically signed by Garima Champion RegSched Rep at 09/19/20 1005     Cari Kamara RN at 09/19/20 1147        Patient reporting nausea. NP notified     Cari Kamara RN  09/19/20 1148      Electronically signed by Cari Kamara RN at 09/19/20 1148          Physician Progress Notes (last 7 days) (Notes from 09/14/20 1417 through 09/21/20 1417)      Lenin Brooks MD at 09/21/20 1012                [facility] Medicine Services Daily Progress Note      Hospitalist Team  LOS 2 days      Patient Care Team:  Polly Domingo MD as PCP - General (Family Medicine)  Bess Serrato MD as PCP - Claims Attributed  Bess Serrato MD as Consulting Physician (Pulmonary Disease)    Patient Location: 201/1      Subjective   Subjective     Chief Complaint / Subjective  Chief Complaint   Patient presents with   • Fever         Brief Synopsis of Hospital Course/HPI  Patient is a 65-year-old female with history of COPD on home oxygen 2.5 L at bedtime.  Presented to the emergency room on 9/20/2020 with complaints of cough productive of brownish sputum, progressively worsening shortness of breath and body aches.  Patient stated symptoms started about 10 days ago  and she had gone to an outlying urgent care facility where she was diagnosed with COVID-19 on 9/11/2020.  Since then her symptoms continue to worsen and she now has to use  "her home oxygen continuously.  Related having subjective fever and chills  Of note is that her  is also positive for COVID-19.  In the ED, temperature was 101.6 and patient 's oxygen saturation was 86% on room air improved to 93% with 5 L of oxygen via nasal cannula.  Chest x-ray done showed worsening multifocal pneumonia.     Patient was admitted for further care.      Date::    9/20/2020  Patient seen and examined  Shortness of breath continues  Currently on nonrebreather mask 11 L and saturating mid 90s    9/21/2020  Patient reports that she feels better but her oxygen requirements are increasing up to 15 L.  Repeat chest x-ray ordered and pulmonologist consulted.  May benefit from convalescent plasma/Actemra.    Review of Systems   All other systems reviewed and negative      Objective   Objective      Vital Signs  Temp:  [94.8 °F (34.9 °C)-97.7 °F (36.5 °C)] 94.8 °F (34.9 °C)  Heart Rate:  [66-87] 74  Resp:  [18-26] 26  BP: (107-116)/(62-76) 115/67  Oxygen Therapy  SpO2: 95 %  Pulse Oximetry Type: Continuous  Device (Oxygen Therapy): nonrebreather mask  Flow (L/min): 15  Flowsheet Rows      First Filed Value   Admission Height  162.6 cm (64\") Documented at 09/19/2020 0912   Admission Weight  101 kg (221 lb 9 oz) Documented at 09/19/2020 0912        Intake & Output (last 3 days)       09/18 0701 - 09/19 0700 09/19 0701 - 09/20 0700 09/20 0701 - 09/21 0700 09/21 0701 - 09/22 0700    P.O.  120 600     Total Intake(mL/kg)  120 (1.2) 600 (5.9)     Urine (mL/kg/hr)   200 (0.1)     Total Output   200     Net  +120 +400             Urine Unmeasured Occurrence   2 x     Stool Unmeasured Occurrence  1 x          Lines, Drains & Airways    Active LDAs     Name:   Placement date:   Placement time:   Site:   Days:    Peripheral IV 09/19/20 0939 Right Antecubital   09/19/20 0939    Antecubital   1                  Physical Exam:    Physical Exam  Vitals signs reviewed.   Constitutional:       Comments: Moderate " respiratory distress   HENT:      Head: Normocephalic and atraumatic.      Nose: Nose normal.   Eyes:      Extraocular Movements: Extraocular movements intact.      Conjunctiva/sclera: Conjunctivae normal.      Pupils: Pupils are equal, round, and reactive to light.   Neck:      Musculoskeletal: Neck supple.   Cardiovascular:      Rate and Rhythm: Normal rate and regular rhythm.   Pulmonary:      Effort: Pulmonary effort is normal.      Comments: Decreased air entry at the bases  Abdominal:      General: Bowel sounds are normal.      Palpations: Abdomen is soft.      Tenderness: There is no abdominal tenderness.   Musculoskeletal: Normal range of motion.   Skin:     General: Skin is warm and dry.   Neurological:      Mental Status: She is alert and oriented to person, place, and time.   Psychiatric:         Mood and Affect: Mood normal.               Procedures:              Results Review:     I reviewed the patient's new clinical results.      Lab Results (last 24 hours)     Procedure Component Value Units Date/Time    Blood Culture - Blood, Arm, Left [[id number]] Collected: 09/19/20 0937    Specimen: Blood from Arm, Left Updated: 09/21/20 1000     Blood Culture No growth at 2 days    Blood Culture - Blood, Arm, Right [[id number]] Collected: 09/19/20 0937    Specimen: Blood from Arm, Right Updated: 09/21/20 1000     Blood Culture No growth at 2 days    Basic Metabolic Panel [[id number]]  (Abnormal) Collected: 09/21/20 0819    Specimen: Blood Updated: 09/21/20 0927     Glucose 132 mg/dL      BUN --     Comment: Testing performed by alternate method        Creatinine 0.63 mg/dL      Sodium 140 mmol/L      Potassium 4.0 mmol/L      Comment: Slight hemolysis detected by analyzer. Results may be affected.        Chloride 104 mmol/L      CO2 28.0 mmol/L      Calcium 7.9 mg/dL      eGFR Non African Amer 95 mL/min/1.73      BUN/Creatinine Ratio --     Comment: Testing not performed        Anion Gap 8.0 mmol/L     Narrative:       GFR Normal >60  Chronic Kidney Disease <60  Kidney Failure <15      BUN [[id number]] Collected: 09/21/20 0819    Specimen: Blood Updated: 09/21/20 0927    CBC & Differential [[id number]]  (Abnormal) Collected: 09/21/20 0819    Specimen: Blood Updated: 09/21/20 0910    Narrative:      The following orders were created for panel order CBC & Differential.  Procedure                               Abnormality         Status                     ---------                               -----------         ------                     CBC Auto Differential[[id number]]        Abnormal            Final result                 Please view results for these tests on the individual orders.    CBC Auto Differential [[id number]]  (Abnormal) Collected: 09/21/20 0819    Specimen: Blood Updated: 09/21/20 0910     WBC 6.00 10*3/mm3      RBC 4.39 10*6/mm3      Hemoglobin 13.8 g/dL      Hematocrit 41.9 %      MCV 95.3 fL      MCH 31.3 pg      MCHC 32.9 g/dL      RDW 14.0 %      RDW-SD 46.4 fl      MPV 7.7 fL      Platelets 277 10*3/mm3      Neutrophil % 75.7 %      Lymphocyte % 13.3 %      Monocyte % 10.9 %      Eosinophil % 0.0 %      Basophil % 0.1 %      Neutrophils, Absolute 4.50 10*3/mm3      Lymphocytes, Absolute 0.80 10*3/mm3      Monocytes, Absolute 0.70 10*3/mm3      Eosinophils, Absolute 0.00 10*3/mm3      Basophils, Absolute 0.00 10*3/mm3      nRBC 0.1 /100 WBC     POC Glucose Once [[id number]]  (Abnormal) Collected: 09/21/20 0810    Specimen: Blood Updated: 09/21/20 0815     Glucose 133 mg/dL      Comment: Serial Number: [id number]:  [id number]       Comprehensive Metabolic Panel [[id number]]  (Abnormal) Collected: 09/21/20 0349    Specimen: Blood Updated: 09/21/20 0704     Glucose 141 mg/dL      BUN --     Comment: Testing performed by alternate method        Creatinine 0.59 mg/dL      Sodium 140 mmol/L      Potassium 4.2 mmol/L      Comment: Slight hemolysis detected by analyzer. Results may be affected.        Chloride  104 mmol/L      CO2 26.0 mmol/L      Calcium 7.9 mg/dL      Total Protein 5.4 g/dL      Albumin 2.90 g/dL      ALT (SGPT) 42 U/L      AST (SGOT) 52 U/L      Alkaline Phosphatase 64 U/L      Total Bilirubin 0.3 mg/dL      eGFR Non African Amer 103 mL/min/1.73      Globulin 2.5 gm/dL      A/G Ratio 1.2 g/dL      BUN/Creatinine Ratio --     Comment: Testing not performed        Anion Gap 10.0 mmol/L     Narrative:      GFR Normal >60  Chronic Kidney Disease <60  Kidney Failure <15      BUN [[id number]] Collected: 09/21/20 0349    Specimen: Blood Updated: 09/21/20 0704    POC Glucose Once [[id number]]  (Abnormal) Collected: 09/20/20 1926    Specimen: Blood Updated: 09/20/20 1927     Glucose 146 mg/dL      Comment: Serial Number: [id number]:  [id number]       POC Glucose Once [[id number]]  (Abnormal) Collected: 09/20/20 1606    Specimen: Blood Updated: 09/20/20 1607     Glucose 170 mg/dL      Comment: Serial Number: [id number]:  [id number]       POC Glucose Once [[id number]]  (Abnormal) Collected: 09/20/20 1118    Specimen: Blood Updated: 09/20/20 1120     Glucose 173 mg/dL      Comment: Serial Number: [id number]:  [id number]           No results found for: HGBA1C        Results from last 7 days   Lab Units 09/19/20  0959   PH, ARTERIAL pH units 7.471*   PO2 ART mm Hg 76.9*   PCO2, ARTERIAL mm Hg 31.8*   HCO3 ART mmol/L 23.2     No results found for: LIPASE  No results found for: CHOL, CHLPL, TRIG, HDL, LDL, LDLDIRECT    No results found for: INTRAOP, PREDX, FINALDX, COMDX    Microbiology Results (last 10 days)     Procedure Component Value - Date/Time    Respiratory Panel, PCR - Swab, Nasopharynx [[id number]]  (Normal) Collected: 09/19/20 1504    Lab Status: Final result Specimen: Swab from Nasopharynx Updated: 09/19/20 1651     ADENOVIRUS, PCR Not Detected     Coronavirus 229E Not Detected     Coronavirus HKU1 Not Detected     Coronavirus NL63 Not Detected     Coronavirus OC43 Not Detected     Human  Metapneumovirus Not Detected     Human Rhinovirus/Enterovirus Not Detected     Influenza B PCR Not Detected     Parainfluenza Virus 1 Not Detected     Parainfluenza Virus 2 Not Detected     Parainfluenza Virus 3 Not Detected     Parainfluenza Virus 4 Not Detected     Bordetella pertussis pcr Not Detected     Influenza A H1 2009 PCR Not Detected     Chlamydophila pneumoniae PCR Not Detected     Mycoplasma pneumo by PCR Not Detected     Influenza A PCR Not Detected     Influenza A H3 Not Detected     Influenza A H1 Not Detected     RSV, PCR Not Detected     Bordetella parapertussis PCR Not Detected    Narrative:      The coronavirus on the RVP is NOT COVID-19 and is NOT indicative of infection with COVID-19.     Blood Culture - Blood, Arm, Left [[id number]] Collected: 09/19/20 0937    Lab Status: Preliminary result Specimen: Blood from Arm, Left Updated: 09/21/20 1000     Blood Culture No growth at 2 days    Blood Culture - Blood, Arm, Right [[id number]] Collected: 09/19/20 0937    Lab Status: Preliminary result Specimen: Blood from Arm, Right Updated: 09/21/20 1000     Blood Culture No growth at 2 days          ECG/EMG Results (most recent)     Procedure Component Value Units Date/Time    ECG 12 Lead [[id number]] Collected: 09/19/20 0944     Updated: 09/20/20 0831    Narrative:      HEART RATE= 110  bpm  RR Interval= 544  ms  FL Interval= 158  ms  P Horizontal Axis= 0  deg  P Front Axis= 61  deg  QRSD Interval= 84  ms  QT Interval= 319  ms  QRS Axis= 5  deg  T Wave Axis= 62  deg  - OTHERWISE NORMAL ECG -  Sinus tachycardia  No previous ECG available for comparison  Electronically Signed By: Reid Pollock ([facility]) 20-Sep-2020 08:30:38  Date and Time of Study: 2020-09-19 09:44:09                    Xr Chest Ap    Result Date: 9/19/2020  Multifocal consolidation involving the right midlung and lung bases bilaterally consistent with multifocal pneumonia, worsened from 09/10/2020.  Electronically Signed By-Ramiro Villa  On:9/19/2020 10:30 AM This report was finalized on [id number] by  Ramiro Villa .          Xrays, labs reviewed personally by physician.    Medication Review:   I have reviewed the patient's current medication list      Scheduled Meds  azithromycin, 250 mg, Oral, Q24H  budesonide-formoterol, 2 puff, Inhalation, BID - RT  cetirizine, 10 mg, Oral, Daily  dexamethasone, 6 mg, Oral, Daily With Breakfast  enoxaparin, 40 mg, Subcutaneous, Q24H  INV [id number] remdesivir in NS IVPB, 100 mg, Intravenous, Q24H  sodium chloride, 10 mL, Intravenous, Q12H        Meds Infusions  Pharmacy Consult - Remdesivir,   Pharmacy Consult - Steroid Insulin Protocol,         Meds PRN  •  acetaminophen **OR** acetaminophen **OR** acetaminophen  •  albuterol sulfate HFA  •  aluminum-magnesium hydroxide-simethicone  •  melatonin  •  nitroglycerin  •  ondansetron **OR** ondansetron  •  Pharmacy Consult - Remdesivir  •  Pharmacy Consult - Steroid Insulin Protocol  •  [COMPLETED] Insert peripheral IV **AND** sodium chloride  •  sodium chloride        Assessment/Plan   Assessment/Plan     Active Hospital Problems    Diagnosis  POA   • COVID-19 virus infection [U07.1]  Yes      Resolved Hospital Problems   No resolved problems to display.       MEDICAL DECISION MAKING COMPLEXITY BY PROBLEM:     Multifocal pneumonia due to COVID-19 infection  Tested positive for COVID-19 on 09/10/2020   respiratory viral panel insignificant  Chest x-ray reviewed-showed worsening of the focal pneumonia  on Decadron and remdesivir   Also started on azithromycin  Respiratory care with bronchodilator  Oxygen therapy and titration  Airborne and contact precaution initiated  Monitor inflammatory markers  Consult pulmonary repeat chest x-ray  May benefit from Actemra/convalescent plasma.  Blood cultures negative so far  Pending respiratory panel    Fever-most likely due to above  Resolved  Acute hypoxic respiratory failure on chronic respiratory failure  Currently on 11 L  of oxygen via nonrebreather mask  Oxygen therapy and titration  Blood cultures negative so far     DVT prophylaxis with Lovenox     Obesity BMI 30.0    Mechanical Order History:     None      Pharmalogical Order History:      Ordered     Dose Route Frequency Stop    09/19/20 1358  enoxaparin (LOVENOX) syringe 40 mg      40 mg SC Every 24 Hours --                  Code Status -   Code Status and Medical Interventions:   Ordered at: 09/19/20 1358     Level Of Support Discussed With:    Patient     Code Status:    CPR     Medical Interventions (Level of Support Prior to Arrest):    Full       This patient has been examined with appropriate PPE. 09/21/20        Discharge Planning  Pending clinical progress          Electronically signed by Lenin Brooks MD, 09/21/20, 10:12 EDT.  [facility] Hospitalist Team        Electronically signed by Lenin Brooks MD at 09/21/20 1250     Alex Leonard MD at 09/20/20 0924                [facility] Medicine Services Daily Progress Note      Hospitalist Team  LOS 1 days      Patient Care Team:  Polly Domingo MD as PCP - General (Family Medicine)  Bess Serrato MD as PCP - Claims Attributed  Bess Serrato MD as Consulting Physician (Pulmonary Disease)    Patient Location: 201/1      Subjective   Subjective     Chief Complaint / Subjective  Chief Complaint   Patient presents with   • Fever         Brief Synopsis of Hospital Course/HPI  Patient is a 65-year-old female with history of COPD on home oxygen 2.5 L at bedtime.  Presented to the emergency room on 9/20/2020 with complaints of cough productive of brownish sputum, progressively worsening shortness of breath and body aches.  Patient stated symptoms started about 10 days ago  and she had gone to an outlying urgent care facility where she was diagnosed with COVID-19 on 9/11/2020.  Since then her symptoms continue to worsen and she now has to use her home oxygen continuously.   "Related having subjective fever and chills  Of note is that her  is also positive for COVID-19.  In the ED, temperature was 101.6 and patient 's oxygen saturation was 86% on room air improved to 93% with 5 L of oxygen via nasal cannula.  Chest x-ray done showed worsening multifocal pneumonia.     Patient was admitted for further care.      Date::    9/20/2020  Patient seen and examined  Shortness of breath continues  Currently on nonrebreather mask 11 L and saturating mid 90s    Review of Systems   Constitution: Negative for chills and fever.   HENT: Negative for congestion.    Eyes: Negative for blurred vision.   Cardiovascular: Negative for chest pain.   Respiratory: Positive for cough and shortness of breath.    Endocrine: Negative for cold intolerance and heat intolerance.   Gastrointestinal: Negative for abdominal pain, nausea and vomiting.   Genitourinary: Negative for dysuria.   Neurological: Positive for weakness.   Psychiatric/Behavioral: Negative for altered mental status.         Objective   Objective      Vital Signs  Temp:  [96 °F (35.6 °C)-101.6 °F (38.7 °C)] 97.3 °F (36.3 °C)  Heart Rate:  [] 88  Resp:  [17-25] 20  BP: (101-146)/(64-81) 118/66  Oxygen Therapy  SpO2: 93 %  Pulse Oximetry Type: Continuous  Device (Oxygen Therapy): nonrebreather mask  Flow (L/min): 11  Flowsheet Rows      First Filed Value   Admission Height  162.6 cm (64\") Documented at 09/19/2020 0912   Admission Weight  101 kg (221 lb 9 oz) Documented at 09/19/2020 0912        Intake & Output (last 3 days)       09/17 0701 - 09/18 0700 09/18 0701 - 09/19 0700 09/19 0701 - 09/20 0700 09/20 0701 - 09/21 0700    P.O.   120 240    Total Intake(mL/kg)   120 (1.2) 240 (2.4)    Net   +120 +240            Urine Unmeasured Occurrence    2 x    Stool Unmeasured Occurrence   1 x         Lines, Drains & Airways    Active LDAs     Name:   Placement date:   Placement time:   Site:   Days:    Peripheral IV 09/19/20 0939 Right " Antecubital   09/19/20    0939    Antecubital   1                  Physical Exam:    Physical Exam  Vitals signs reviewed.   Constitutional:       Appearance: She is ill-appearing.   HENT:      Head: Normocephalic and atraumatic.      Nose: Nose normal.   Eyes:      Extraocular Movements: Extraocular movements intact.      Conjunctiva/sclera: Conjunctivae normal.      Pupils: Pupils are equal, round, and reactive to light.   Neck:      Musculoskeletal: Neck supple.   Cardiovascular:      Rate and Rhythm: Normal rate and regular rhythm.   Pulmonary:      Effort: Pulmonary effort is normal.      Comments: Decreased air entry at the bases  Abdominal:      General: Bowel sounds are normal.      Palpations: Abdomen is soft.      Tenderness: There is no abdominal tenderness.   Musculoskeletal: Normal range of motion.   Skin:     General: Skin is warm and dry.   Neurological:      Mental Status: She is alert and oriented to person, place, and time.   Psychiatric:         Mood and Affect: Mood normal.               Procedures:              Results Review:     I reviewed the patient's new clinical results.      Lab Results (last 24 hours)     Procedure Component Value Units Date/Time    Blood Culture - Blood, Arm, Right [[id number]] Collected: 09/19/20 0937    Specimen: Blood from Arm, Right Updated: 09/20/20 0945     Blood Culture No growth at 24 hours    BUN [[id number]]  (Normal) Collected: 09/20/20 0322    Specimen: Blood Updated: 09/20/20 0711     BUN 10 mg/dL     POC Glucose Once [[id number]]  (Abnormal) Collected: 09/20/20 0701    Specimen: Blood Updated: 09/20/20 0702     Glucose 120 mg/dL      Comment: Serial Number: [id number]:  [id number]       Ferritin [[id number]]  (Abnormal) Collected: 09/20/20 0322    Specimen: Blood Updated: 09/20/20 0446     Ferritin 1,797.00 ng/mL     Narrative:      Results may be falsely decreased if patient taking Biotin.      Comprehensive Metabolic Panel [[id number]]  (Abnormal)  Collected: 09/20/20 0322    Specimen: Blood Updated: 09/20/20 0442     Glucose 129 mg/dL      BUN --     Comment: Testing performed by alternate method        Creatinine 0.53 mg/dL      Sodium 138 mmol/L      Potassium 3.9 mmol/L      Chloride 102 mmol/L      CO2 26.0 mmol/L      Calcium 7.9 mg/dL      Total Protein 5.8 g/dL      Albumin 3.10 g/dL      ALT (SGPT) 45 U/L      AST (SGOT) 63 U/L      Alkaline Phosphatase 64 U/L      Total Bilirubin 0.2 mg/dL      eGFR Non African Amer 116 mL/min/1.73      Globulin 2.7 gm/dL      A/G Ratio 1.1 g/dL      BUN/Creatinine Ratio --     Comment: Testing not performed        Anion Gap 10.0 mmol/L     Narrative:      GFR Normal >60  Chronic Kidney Disease <60  Kidney Failure <15      D-dimer, Quantitative [[id number]]  (Normal) Collected: 09/20/20 0322    Specimen: Blood Updated: 09/20/20 0424     D-Dimer, Quantitative 0.53 mg/L (FEU)     Narrative:      Reference Range  --------------------------------------------------------------------     < 0.50   Negative Predictive Value  0.50-0.59   Indeterminate    >= 0.60   Probable VTE             A very low percentage of patients with DVT may yield D-Dimer results   below the cut-off of 0.50 mg/L FEU.  This is known to be more   prevalent in patients with distal DVT.             Results of this test should always be interpreted in conjunction with   the patient's medical history, clinical presentation and other   findings.  Clinical diagnosis should not be based on the result of   [place] D-Dimer alone.    CBC Auto Differential [[id number]]  (Abnormal) Collected: 09/20/20 0322    Specimen: Blood Updated: 09/20/20 0414     WBC 3.50 10*3/mm3      RBC 4.43 10*6/mm3      Hemoglobin 14.0 g/dL      Hematocrit 41.2 %      MCV 93.0 fL      MCH 31.5 pg      MCHC 33.9 g/dL      RDW 13.7 %      RDW-SD 45.1 fl      MPV 8.0 fL      Platelets 207 10*3/mm3      Neutrophil % 70.4 %      Lymphocyte % 18.6 %      Monocyte % 10.8 %      Eosinophil %  0.0 %      Basophil % 0.2 %      Neutrophils, Absolute 2.50 10*3/mm3      Lymphocytes, Absolute 0.70 10*3/mm3      Monocytes, Absolute 0.40 10*3/mm3      Eosinophils, Absolute 0.00 10*3/mm3      Basophils, Absolute 0.00 10*3/mm3      nRBC 0.1 /100 WBC     POC Glucose Once [[id number]]  (Abnormal) Collected: 09/19/20 2142    Specimen: Blood Updated: 09/19/20 2143     Glucose 148 mg/dL      Comment: Serial Number: [id number]:  [id number]       Respiratory Panel, PCR - Swab, Nasopharynx [[id number]]  (Normal) Collected: 09/19/20 1504    Specimen: Swab from Nasopharynx Updated: 09/19/20 1651     ADENOVIRUS, PCR Not Detected     Coronavirus 229E Not Detected     Coronavirus HKU1 Not Detected     Coronavirus NL63 Not Detected     Coronavirus OC43 Not Detected     Human Metapneumovirus Not Detected     Human Rhinovirus/Enterovirus Not Detected     Influenza B PCR Not Detected     Parainfluenza Virus 1 Not Detected     Parainfluenza Virus 2 Not Detected     Parainfluenza Virus 3 Not Detected     Parainfluenza Virus 4 Not Detected     Bordetella pertussis pcr Not Detected     Influenza A H1 2009 PCR Not Detected     Chlamydophila pneumoniae PCR Not Detected     Mycoplasma pneumo by PCR Not Detected     Influenza A PCR Not Detected     Influenza A H3 Not Detected     Influenza A H1 Not Detected     RSV, PCR Not Detected     Bordetella parapertussis PCR Not Detected    Narrative:      The coronavirus on the RVP is NOT COVID-19 and is NOT indicative of infection with COVID-19.     POC Glucose Once [[id number]]  (Abnormal) Collected: 09/19/20 1559    Specimen: Blood Updated: 09/19/20 1600     Glucose 143 mg/dL      Comment: Serial Number: [id number]:  [id number]       BUN [[id number]]  (Abnormal) Collected: 09/19/20 1009    Specimen: Blood Updated: 09/19/20 1115     BUN 7 mg/dL     Procalcitonin [[id number]]  (Abnormal) Collected: 09/19/20 1009    Specimen: Blood Updated: 09/19/20 1106     Procalcitonin 0.55 ng/mL      "Narrative:      As a Marker for Sepsis (Non-Neonates):   1. <0.5 ng/mL represents a low risk of severe sepsis and/or septic shock.  1. >2 ng/mL represents a high risk of severe sepsis and/or septic shock.    As a Marker for Lower Respiratory Tract Infections that require antibiotic therapy:  PCT on Admission     Antibiotic Therapy             6-12 Hrs later  > 0.5                Strongly Recommended            >0.25 - <0.5         Recommended  0.1 - 0.25           Discouraged                   Remeasure/reassess PCT  <0.1                 Strongly Discouraged          Remeasure/reassess PCT      As 28 day mortality risk marker: \"Change in Procalcitonin Result\" (> 80 % or <=80 %) if Day 0 (or Day 1) and Day 4 values are available. Refer to http://[email or web address]-calculator.com/   Change in PCT <=80 %   A decrease of PCT levels below or equal to 80 % defines a positive change in PCT test result representing a higher risk for 28-day all-cause mortality of patients diagnosed with severe sepsis or septic shock.  Change in PCT > 80 %   A decrease of PCT levels of more than 80 % defines a negative change in PCT result representing a lower risk for 28-day all-cause mortality of patients diagnosed with severe sepsis or septic shock.                Results may be falsely decreased if patient taking Biotin.     Comprehensive Metabolic Panel [[id number]]  (Abnormal) Collected: 09/19/20 1009    Specimen: Blood Updated: 09/19/20 1104     Glucose 118 mg/dL      BUN --     Comment: Testing performed by alternate method        Creatinine 0.57 mg/dL      Sodium 133 mmol/L      Potassium 3.6 mmol/L      Chloride 97 mmol/L      CO2 25.0 mmol/L      Calcium 8.0 mg/dL      Total Protein 5.9 g/dL      Albumin 3.30 g/dL      ALT (SGPT) 45 U/L      AST (SGOT) 67 U/L      Alkaline Phosphatase 61 U/L      Total Bilirubin 0.3 mg/dL      eGFR Non African Amer 107 mL/min/1.73      Globulin 2.6 gm/dL      A/G Ratio 1.3 g/dL      BUN/Creatinine Ratio " --     Comment: Testing not performed        Anion Gap 11.0 mmol/L     Narrative:      GFR Normal >60  Chronic Kidney Disease <60  Kidney Failure <15      Lactate Dehydrogenase [[id number]]  (Abnormal) Collected: 09/19/20 1009    Specimen: Blood Updated: 09/19/20 1104      U/L     C-reactive Protein [[id number]]  (Abnormal) Collected: 09/19/20 1009    Specimen: Blood Updated: 09/19/20 1104     C-Reactive Protein 11.34 mg/dL     Troponin [[id number]]  (Normal) Collected: 09/19/20 1009    Specimen: Blood Updated: 09/19/20 1104     Troponin T <0.010 ng/mL     Narrative:      Troponin T Reference Range:  <= 0.03 ng/mL-   Negative for AMI  >0.03 ng/mL-     Abnormal for myocardial necrosis.  Clinicians would have to utilize clinical acumen, EKG, Troponin and serial changes to determine if it is an Acute Myocardial Infarction or myocardial injury due to an underlying chronic condition.       Results may be falsely decreased if patient taking Biotin.      Blood Gas, Arterial [[id number]]  (Abnormal) Collected: 09/19/20 0959    Specimen: Arterial Blood Updated: 09/19/20 1002     Site Right Radial     Blair's Test Positive     pH, Arterial 7.471 pH units      pCO2, Arterial 31.8 mm Hg      pO2, Arterial 76.9 mm Hg      HCO3, Arterial 23.2 mmol/L      Base Excess, Arterial 0.4 mmol/L      Comment: Serial Number: [id number]:  [id number]        O2 Saturation, Arterial 96.2 %      CO2 Content 24.2 mmol/L      Barometric Pressure for Blood Gas --     Comment: N/A        Modality Cannula     FIO2 40 %      Hemodilution No    CBC & Differential [[id number]]  (Abnormal) Collected: 09/19/20 0937    Specimen: Blood Updated: 09/19/20 1000    Narrative:      The following orders were created for panel order CBC & Differential.  Procedure                               Abnormality         Status                     ---------                               -----------         ------                     CBC Auto Differential[[id number]]         Abnormal            Final result                 Please view results for these tests on the individual orders.    CBC Auto Differential [[id number]]  (Abnormal) Collected: 09/19/20 0937    Specimen: Blood Updated: 09/19/20 1000     WBC 6.20 10*3/mm3      RBC 4.65 10*6/mm3      Hemoglobin 14.7 g/dL      Hematocrit 43.2 %      MCV 92.9 fL      MCH 31.5 pg      MCHC 34.0 g/dL      RDW 14.0 %      RDW-SD 45.1 fl      MPV 8.4 fL      Platelets 204 10*3/mm3      Neutrophil % 84.3 %      Lymphocyte % 10.2 %      Monocyte % 5.2 %      Eosinophil % 0.0 %      Basophil % 0.3 %      Neutrophils, Absolute 5.20 10*3/mm3      Lymphocytes, Absolute 0.60 10*3/mm3      Monocytes, Absolute 0.30 10*3/mm3      Eosinophils, Absolute 0.00 10*3/mm3      Basophils, Absolute 0.00 10*3/mm3      nRBC 0.0 /100 WBC         No results found for: HGBA1C        Results from last 7 days   Lab Units 09/19/20  0959   PH, ARTERIAL pH units 7.471*   PO2 ART mm Hg 76.9*   PCO2, ARTERIAL mm Hg 31.8*   HCO3 ART mmol/L 23.2     No results found for: LIPASE  No results found for: CHOL, CHLPL, TRIG, HDL, LDL, LDLDIRECT    No results found for: INTRAOP, PREDX, FINALDX, COMDX    Microbiology Results (last 10 days)     Procedure Component Value - Date/Time    Respiratory Panel, PCR - Swab, Nasopharynx [[id number]]  (Normal) Collected: 09/19/20 1504    Lab Status: Final result Specimen: Swab from Nasopharynx Updated: 09/19/20 1651     ADENOVIRUS, PCR Not Detected     Coronavirus 229E Not Detected     Coronavirus HKU1 Not Detected     Coronavirus NL63 Not Detected     Coronavirus OC43 Not Detected     Human Metapneumovirus Not Detected     Human Rhinovirus/Enterovirus Not Detected     Influenza B PCR Not Detected     Parainfluenza Virus 1 Not Detected     Parainfluenza Virus 2 Not Detected     Parainfluenza Virus 3 Not Detected     Parainfluenza Virus 4 Not Detected     Bordetella pertussis pcr Not Detected     Influenza A H1 2009 PCR Not Detected      Chlamydophila pneumoniae PCR Not Detected     Mycoplasma pneumo by PCR Not Detected     Influenza A PCR Not Detected     Influenza A H3 Not Detected     Influenza A H1 Not Detected     RSV, PCR Not Detected     Bordetella parapertussis PCR Not Detected    Narrative:      The coronavirus on the RVP is NOT COVID-19 and is NOT indicative of infection with COVID-19.     Blood Culture - Blood, Arm, Right [[id number]] Collected: 09/19/20 0937    Lab Status: Preliminary result Specimen: Blood from Arm, Right Updated: 09/20/20 0945     Blood Culture No growth at 24 hours    COVID-19,[facility],NP/OP Swab in Transport Media or ESwab 72 HR TAT - Swab, Nasopharynx [[id number]]  (Abnormal) Collected: 09/10/20 1313    Lab Status: Final result Specimen: Swab from Nasopharynx Updated: 09/12/20 2207    Narrative:      The following orders were created for panel order COVID-19,[facility],NP/OP Swab in Transport Media or ESwab 72 HR TAT - Swab, Nasopharynx.  Procedure                               Abnormality         Status                     ---------                               -----------         ------                     COVID-19,[facility] ROUTINE...[[id number]]  Abnormal            Final result                 Please view results for these tests on the individual orders.    COVID-19,[facility] ROUTINE, NP/OP SWAB IN TRANSPORT MEDIA OR ESWAB 72 HR TAT - Swab, Nasopharynx [[id number]]  (Abnormal) Collected: 09/10/20 1313    Lab Status: Final result Specimen: Swab from Nasopharynx Updated: 09/12/20 2207     SARS-CoV-2, MARILYN Detected     Comment: This nucleic acid amplification test was developed and its performance  characteristics determined by [facility]. Nucleic acid  amplification tests include PCR and TMA. This test has not been FDA  cleared or approved. This test has been authorized by FDA under an  Emergency Use Authorization (EUA). This test is only authorized for  the duration of time the declaration that circumstances  exist  justifying the authorization of the emergency use of in vitro  diagnostic tests for detection of SARS-CoV-2 virus and/or diagnosis  of COVID-19 infection under section 564(b)(1) of the Act, 21 U.S.C.  360bbb-3(b) (1), unless the authorization is terminated or revoked  sooner.  When diagnostic testing is negative, the possibility of a false  negative result should be considered in the context of a patient's  recent exposures and the presence of clinical signs and symptoms  consistent with COVID-19. An individual without symptoms of COVID-19  and who is not shedding SARS-CoV-2 virus would expect to have a  negative (not detected) result in this assay.       Narrative:      Performed at:  [address] [place] [address] [facility]  [address] [facility], [place]  [id number]  : Eliza Rose MD, Phone:  [phone]          ECG/EMG Results (most recent)     Procedure Component Value Units Date/Time    ECG 12 Lead [[id number]] Collected: 09/19/20 0944     Updated: 09/20/20 0831    Narrative:      HEART RATE= 110  bpm  RR Interval= 544  ms  NM Interval= 158  ms  P Horizontal Axis= 0  deg  P Front Axis= 61  deg  QRSD Interval= 84  ms  QT Interval= 319  ms  QRS Axis= 5  deg  T Wave Axis= 62  deg  - OTHERWISE NORMAL ECG -  Sinus tachycardia  No previous ECG available for comparison  Electronically Signed By: Reid Pollock (JAYNE) 20-Sep-2020 08:30:38  Date and Time of Study: 2020-09-19 09:44:09                    Xr Chest Ap    Result Date: 9/19/2020  Multifocal consolidation involving the right midlung and lung bases bilaterally consistent with multifocal pneumonia, worsened from 09/10/2020.  Electronically Signed By-Ramiro Villa On:9/19/2020 10:30 AM This report was finalized on [id number] by  Ramiro Villa, .          Xrays, labs reviewed personally by physician.    Medication Review:   I have reviewed the patient's current medication list      Scheduled Meds  azithromycin, 250 mg, Oral,  Q24H  budesonide-formoterol, 2 puff, Inhalation, BID - RT  cetirizine, 10 mg, Oral, Daily  dexamethasone, 6 mg, Oral, Daily With Breakfast  enoxaparin, 40 mg, Subcutaneous, Q24H  INV [id number] remdesivir in NS IVPB, 100 mg, Intravenous, Q24H  sodium chloride, 10 mL, Intravenous, Q12H        Meds Infusions  Pharmacy Consult - Remdesivir,   Pharmacy Consult - Steroid Insulin Protocol,         Meds PRN  •  acetaminophen **OR** acetaminophen **OR** acetaminophen  •  albuterol sulfate HFA  •  aluminum-magnesium hydroxide-simethicone  •  melatonin  •  nitroglycerin  •  ondansetron **OR** ondansetron  •  Pharmacy Consult - Remdesivir  •  Pharmacy Consult - Steroid Insulin Protocol  •  [COMPLETED] Insert peripheral IV **AND** sodium chloride  •  sodium chloride        Assessment/Plan   Assessment/Plan     Active Hospital Problems    Diagnosis  POA   • COVID-19 virus infection [U07.1]  Yes      Resolved Hospital Problems   No resolved problems to display.       MEDICAL DECISION MAKING COMPLEXITY BY PROBLEM:     Multifocal pneumonia due to COVID-19 infection  Tested positive for COVID-19 on 09/10/2020   respiratory viral panel insignificant  Chest x-ray reviewed-showed worsening of the focal pneumonia  on Decadron and remdesivir   Also started on azithromycin  Respiratory care with bronchodilator  Oxygen therapy and titration  Airborne and contact precaution initiated  Monitor inflammatory markers     Fever-most likely due to above  Analgesic as needed     Acute hypoxic respiratory failure on chronic respiratory failure  Currently on 11 L of oxygen via nonrebreather mask  Oxygen therapy and titration        DVT prophylaxis with Lovenox         Mechanical Order History:     None      Pharmalogical Order History:      Ordered     Dose Route Frequency Stop    09/19/20 4438  enoxaparin (LOVENOX) syringe 40 mg      40 mg SC Every 24 Hours --                  Code Status -   Code Status and Medical Interventions:   Ordered at:  09/19/20 1358     Level Of Support Discussed With:    Patient     Code Status:    CPR     Medical Interventions (Level of Support Prior to Arrest):    Full       This patient has been examined with appropriate PPE. 09/20/20        Discharge Planning  Pending clinical progress          Electronically signed by Alex Leonard MD, 09/20/20, 09:52 EDT.  [facility]        Electronically signed by Alex Leonard MD at 09/20/20 1345       Consult Notes (last 7 days) (Notes from 09/14/20 through 09/21/20)    No notes of this type exist for this encounter.          Private Residence

## 2020-09-21 NOTE — PLAN OF CARE
Goal Outcome Evaluation:  Plan of Care Reviewed With: patient  Progress: no change  Outcome Summary: Pt. admitted for COVID. Reports increased SOA at home leading her to come to hospital. Remains on Remdesivir. Plans to return home    Patient is alert and oriented.  Patient uses the BSC.  Patient is on 15L O2 non-rebreather mask. Patient denies pain.   Will continue to monitor

## 2020-09-21 NOTE — PLAN OF CARE
Goal Outcome Evaluation:  Plan of Care Reviewed With: patient  Progress: no change       Patient has been resting well this shift. No acute s/s of distress or discomfort. Cont on 15L NRB mask, patient desats quickly without mask and during activity. Cont on IV remdesivir, IV rocephin, tolerating well. No other issues noted this shift. Will cont to monitor.

## 2020-09-22 LAB
ALBUMIN SERPL-MCNC: 3.1 G/DL (ref 3.5–5.2)
ALBUMIN/GLOB SERPL: 1.3 G/DL
ALP SERPL-CCNC: 74 U/L (ref 39–117)
ALT SERPL W P-5'-P-CCNC: 48 U/L (ref 1–33)
ANION GAP SERPL CALCULATED.3IONS-SCNC: 7 MMOL/L (ref 5–15)
AST SERPL-CCNC: 47 U/L (ref 1–32)
BASOPHILS # BLD AUTO: 0 10*3/MM3 (ref 0–0.2)
BASOPHILS NFR BLD AUTO: 0.2 % (ref 0–1.5)
BB HOLD TUBE: NORMAL
BILIRUB SERPL-MCNC: 0.3 MG/DL (ref 0–1.2)
BUN SERPL-MCNC: 20 MG/DL (ref 8–23)
BUN SERPL-MCNC: ABNORMAL MG/DL
BUN/CREAT SERPL: ABNORMAL
CALCIUM SPEC-SCNC: 8.1 MG/DL (ref 8.6–10.5)
CHLORIDE SERPL-SCNC: 105 MMOL/L (ref 98–107)
CK SERPL-CCNC: 97 U/L (ref 20–180)
CO2 SERPL-SCNC: 30 MMOL/L (ref 22–29)
CREAT SERPL-MCNC: 0.63 MG/DL (ref 0.57–1)
CRP SERPL-MCNC: 2.05 MG/DL (ref 0–0.5)
D DIMER PPP FEU-MCNC: 0.61 MG/L (FEU) (ref 0–0.59)
DEPRECATED RDW RBC AUTO: 46.8 FL (ref 37–54)
EOSINOPHIL # BLD AUTO: 0 10*3/MM3 (ref 0–0.4)
EOSINOPHIL NFR BLD AUTO: 0 % (ref 0.3–6.2)
ERYTHROCYTE [DISTWIDTH] IN BLOOD BY AUTOMATED COUNT: 14.1 % (ref 12.3–15.4)
FERRITIN SERPL-MCNC: 999 NG/ML (ref 13–150)
FIBRINOGEN PPP-MCNC: 407 MG/DL (ref 210–450)
GFR SERPL CREATININE-BSD FRML MDRD: 95 ML/MIN/1.73
GLOBULIN UR ELPH-MCNC: 2.4 GM/DL
GLUCOSE BLDC GLUCOMTR-MCNC: 128 MG/DL (ref 70–105)
GLUCOSE BLDC GLUCOMTR-MCNC: 138 MG/DL (ref 70–105)
GLUCOSE BLDC GLUCOMTR-MCNC: 150 MG/DL (ref 70–105)
GLUCOSE BLDC GLUCOMTR-MCNC: 203 MG/DL (ref 70–105)
GLUCOSE SERPL-MCNC: 145 MG/DL (ref 65–99)
HCT VFR BLD AUTO: 41 % (ref 34–46.6)
HGB BLD-MCNC: 13.6 G/DL (ref 12–15.9)
LDH SERPL-CCNC: 346 U/L (ref 135–214)
LYMPHOCYTES # BLD AUTO: 0.6 10*3/MM3 (ref 0.7–3.1)
LYMPHOCYTES NFR BLD AUTO: 9.7 % (ref 19.6–45.3)
MCH RBC QN AUTO: 31.4 PG (ref 26.6–33)
MCHC RBC AUTO-ENTMCNC: 33.3 G/DL (ref 31.5–35.7)
MCV RBC AUTO: 94.3 FL (ref 79–97)
MONOCYTES # BLD AUTO: 0.7 10*3/MM3 (ref 0.1–0.9)
MONOCYTES NFR BLD AUTO: 10.3 % (ref 5–12)
NEUTROPHILS NFR BLD AUTO: 5.2 10*3/MM3 (ref 1.7–7)
NEUTROPHILS NFR BLD AUTO: 79.8 % (ref 42.7–76)
NRBC BLD AUTO-RTO: 0.1 /100 WBC (ref 0–0.2)
PLATELET # BLD AUTO: 292 10*3/MM3 (ref 140–450)
PMV BLD AUTO: 7.8 FL (ref 6–12)
POTASSIUM SERPL-SCNC: 4.4 MMOL/L (ref 3.5–5.2)
PROT SERPL-MCNC: 5.5 G/DL (ref 6–8.5)
RBC # BLD AUTO: 4.34 10*6/MM3 (ref 3.77–5.28)
SODIUM SERPL-SCNC: 142 MMOL/L (ref 136–145)
WBC # BLD AUTO: 6.5 10*3/MM3 (ref 3.4–10.8)

## 2020-09-22 PROCEDURE — 85379 FIBRIN DEGRADATION QUANT: CPT | Performed by: INTERNAL MEDICINE

## 2020-09-22 PROCEDURE — XW13325 TRANSFUSION OF CONVALESCENT PLASMA (NONAUTOLOGOUS) INTO PERIPHERAL VEIN, PERCUTANEOUS APPROACH, NEW TECHNOLOGY GROUP 5: ICD-10-PCS | Performed by: INTERNAL MEDICINE

## 2020-09-22 PROCEDURE — 82962 GLUCOSE BLOOD TEST: CPT

## 2020-09-22 PROCEDURE — 93010 ELECTROCARDIOGRAM REPORT: CPT | Performed by: INTERNAL MEDICINE

## 2020-09-22 PROCEDURE — 86927 PLASMA FRESH FROZEN: CPT

## 2020-09-22 PROCEDURE — 94799 UNLISTED PULMONARY SVC/PX: CPT

## 2020-09-22 PROCEDURE — 85384 FIBRINOGEN ACTIVITY: CPT | Performed by: INTERNAL MEDICINE

## 2020-09-22 PROCEDURE — 87205 SMEAR GRAM STAIN: CPT | Performed by: INTERNAL MEDICINE

## 2020-09-22 PROCEDURE — 82550 ASSAY OF CK (CPK): CPT | Performed by: INTERNAL MEDICINE

## 2020-09-22 PROCEDURE — 93005 ELECTROCARDIOGRAM TRACING: CPT | Performed by: INTERNAL MEDICINE

## 2020-09-22 PROCEDURE — 87070 CULTURE OTHR SPECIMN AEROBIC: CPT | Performed by: INTERNAL MEDICINE

## 2020-09-22 PROCEDURE — 25010000002 ENOXAPARIN PER 10 MG: Performed by: INTERNAL MEDICINE

## 2020-09-22 PROCEDURE — 80053 COMPREHEN METABOLIC PANEL: CPT | Performed by: INTERNAL MEDICINE

## 2020-09-22 PROCEDURE — 25010000002 CEFEPIME PER 500 MG: Performed by: INTERNAL MEDICINE

## 2020-09-22 PROCEDURE — 82728 ASSAY OF FERRITIN: CPT | Performed by: INTERNAL MEDICINE

## 2020-09-22 PROCEDURE — 85025 COMPLETE CBC W/AUTO DIFF WBC: CPT | Performed by: INTERNAL MEDICINE

## 2020-09-22 PROCEDURE — 99233 SBSQ HOSP IP/OBS HIGH 50: CPT | Performed by: INTERNAL MEDICINE

## 2020-09-22 PROCEDURE — 25010000002 DEXAMETHASONE SODIUM PHOSPHATE 10 MG/ML SOLUTION: Performed by: INTERNAL MEDICINE

## 2020-09-22 PROCEDURE — 86140 C-REACTIVE PROTEIN: CPT | Performed by: INTERNAL MEDICINE

## 2020-09-22 PROCEDURE — 83615 LACTATE (LD) (LDH) ENZYME: CPT | Performed by: INTERNAL MEDICINE

## 2020-09-22 RX ADMIN — DEXAMETHASONE SODIUM PHOSPHATE 6 MG: 10 INJECTION, SOLUTION INTRAMUSCULAR; INTRAVENOUS at 20:12

## 2020-09-22 RX ADMIN — CEFEPIME 2 G: 2 INJECTION, POWDER, FOR SOLUTION INTRAVENOUS at 08:37

## 2020-09-22 RX ADMIN — CEFEPIME 2 G: 2 INJECTION, POWDER, FOR SOLUTION INTRAVENOUS at 17:38

## 2020-09-22 RX ADMIN — Medication 10 ML: at 08:37

## 2020-09-22 RX ADMIN — BUDESONIDE AND FORMOTEROL FUMARATE DIHYDRATE 2 PUFF: 160; 4.5 AEROSOL RESPIRATORY (INHALATION) at 07:34

## 2020-09-22 RX ADMIN — CETIRIZINE HYDROCHLORIDE 10 MG: 10 TABLET, FILM COATED ORAL at 08:37

## 2020-09-22 RX ADMIN — SODIUM CHLORIDE 100 MG: 9 INJECTION, SOLUTION INTRAVENOUS at 15:57

## 2020-09-22 RX ADMIN — Medication 10 ML: at 20:13

## 2020-09-22 RX ADMIN — AZITHROMYCIN MONOHYDRATE 250 MG: 250 TABLET ORAL at 08:37

## 2020-09-22 RX ADMIN — BUDESONIDE AND FORMOTEROL FUMARATE DIHYDRATE 2 PUFF: 160; 4.5 AEROSOL RESPIRATORY (INHALATION) at 20:36

## 2020-09-22 RX ADMIN — PANTOPRAZOLE SODIUM 40 MG: 40 TABLET, DELAYED RELEASE ORAL at 05:30

## 2020-09-22 RX ADMIN — ENOXAPARIN SODIUM 40 MG: 40 INJECTION SUBCUTANEOUS at 15:57

## 2020-09-22 RX ADMIN — CEFEPIME 2 G: 2 INJECTION, POWDER, FOR SOLUTION INTRAVENOUS at 01:06

## 2020-09-22 RX ADMIN — DEXAMETHASONE SODIUM PHOSPHATE 6 MG: 10 INJECTION, SOLUTION INTRAMUSCULAR; INTRAVENOUS at 08:36

## 2020-09-22 NOTE — PLAN OF CARE
Goal Outcome Evaluation:  Plan of Care Reviewed With: patient  Progress: improving  Outcome Summary: Pt is now on 8L NRB, tolerating it well at 96-97% 02 Pt received 2 units of Convalescent Plasma today.  No c/o of pain or distress throughout the day.  Pt states she is feeling better and hopes to go home soon.

## 2020-09-22 NOTE — NURSING NOTE
Writer was informed by [facility] that they only had 1 unit of convalescent Plasma available tonight and that to give 2 units they would have to be done back to back. Blood bank will have another unit at 1100 tomorrow morning and to wait until they have both units available to start.

## 2020-09-22 NOTE — PAYOR COMM NOTE
"CLINICAL UPDATE FOR CONTINUED STAY:  Monisha Wilder (65 y.o. Female)   1955  Auth # [id number]        AUTHORIZATION PENDING:   PLEASE CALL OR FAX DETERMINATION TO CONTACT BELOW. THANK YOU.        Sharon Lackey, RN MSN  /UR  [facility]  [phone] office  [phone] fax  [email or web address]    [facility]  NPI: [id number]  Tax: 537-      Monisha Wilder (65 y.o. Female)   1955  Auth # [id number]    Date of Birth Social Security Number Address Home Phone MRN    1955  [facility] OFFICERS   [place] [address] [phone] [id number]    [employer] Marital Status          Non-Restorationist        Admission Date Admission Type Admitting Provider Attending Provider Department, Room/Bed    9/19/20 Emergency Lenin Brooks MD Gad, George Fayez Labib Youssief, MD [facility] 2A PEDIATRICS, 201/1    Discharge Date Discharge Disposition Discharge Destination                       Attending Provider: Lenin Brooks MD    Allergies: Sulfa Antibiotics    Isolation: Enh Drop/Con   Infection: COVID (confirmed) (09/12/20)   Code Status: CPR    Ht: 162.6 cm (64\")   Wt: 107 kg (236 lb)    Admission Cmt: None   Principal Problem: None                Active Insurance as of 9/19/2020     Primary Coverage     Payor Plan Insurance Group Employer/Plan Group    ANTHEM BLUE CROSS ANTHEM BLUE [place] BLUE [facility] PPO [id number]     Payor Plan Address Payor Plan Phone Number Payor Plan Fax Number Effective Dates    [address] [phone]  1/1/2020 - None Entered    [facility] [id number]       Subscriber Name Subscriber Birth Date Member ID       CHENTE TREVIÑO 1955 [id number]           Secondary Coverage     Payor Plan Insurance Group Employer/Plan Group    MEDICARE MEDICARE A & B      Payor Plan Address Payor Plan Phone Number Payor Plan Fax Number Effective Dates    [address] [phone]  1/1/2013 - " None Entered    [facility] [place] [address]       Subscriber Name Subscriber Birth Date Member ID       ANTONIA MONROY 1955 [id number]                 Emergency Contacts      (Rel.) Home Phone Work Phone Mobile Phone    CHENTE TREVIÑO (Spouse) -- -- [phone]               Physician Progress Notes (last 24 hours) (Notes from 09/21/20 0730 through 09/22/20 0730)      Lenin Brooks MD at 09/21/20 [facility]                [facility] Medicine Services Daily Progress Note      Hospitalist Team  LOS 2 days      Patient Care Team:  Polly Domingo MD as PCP - General (Family Medicine)  Bess Serrato MD as PCP - Claims Attributed  Bess Serrato MD as Consulting Physician (Pulmonary Disease)    Patient Location: 201/1      Subjective   Subjective     Chief Complaint / Subjective  Chief Complaint   Patient presents with   • Fever         Brief Synopsis of Hospital Course/HPI  Patient is a 65-year-old female with history of COPD on home oxygen 2.5 L at bedtime.  Presented to the emergency room on 9/20/2020 with complaints of cough productive of brownish sputum, progressively worsening shortness of breath and body aches.  Patient stated symptoms started about 10 days ago  and she had gone to an outlying urgent care facility where she was diagnosed with COVID-19 on 9/11/2020.  Since then her symptoms continue to worsen and she now has to use her home oxygen continuously.  Related having subjective fever and chills  Of note is that her  is also positive for COVID-19.  In the ED, temperature was 101.6 and patient 's oxygen saturation was 86% on room air improved to 93% with 5 L of oxygen via nasal cannula.  Chest x-ray done showed worsening multifocal pneumonia.     Patient was admitted for further care.      Date::    9/20/2020  Patient seen and examined  Shortness of breath continues  Currently on nonrebreather mask 11 L and saturating mid  "90s    9/21/2020  Patient reports that she feels better but her oxygen requirements are increasing up to 15 L.  Repeat chest x-ray ordered and pulmonologist consulted.  May benefit from convalescent plasma/Actemra.    Review of Systems   All other systems reviewed and negative      Objective   Objective      Vital Signs  Temp:  [94.8 °F (34.9 °C)-97.7 °F (36.5 °C)] 94.8 °F (34.9 °C)  Heart Rate:  [66-87] 74  Resp:  [18-26] 26  BP: (107-116)/(62-76) 115/67  Oxygen Therapy  SpO2: 95 %  Pulse Oximetry Type: Continuous  Device (Oxygen Therapy): nonrebreather mask  Flow (L/min): 15  Flowsheet Rows      First Filed Value   Admission Height  162.6 cm (64\") Documented at 09/19/2020 0912   Admission Weight  101 kg (221 lb 9 oz) Documented at 09/19/2020 0912        Intake & Output (last 3 days)       09/18 0701 - 09/19 0700 09/19 0701 - 09/20 0700 09/20 0701 - 09/21 0700 09/21 0701 - 09/22 0700    P.O.  120 600     Total Intake(mL/kg)  120 (1.2) 600 (5.9)     Urine (mL/kg/hr)   200 (0.1)     Total Output   200     Net  +120 +400             Urine Unmeasured Occurrence   2 x     Stool Unmeasured Occurrence  1 x          Lines, Drains & Airways    Active LDAs     Name:   Placement date:   Placement time:   Site:   Days:    Peripheral IV 09/19/20 0939 Right Antecubital   09/19/20    0939    Antecubital   1                  Physical Exam:    Physical Exam  Vitals signs reviewed.   Constitutional:       Comments: Moderate respiratory distress   HENT:      Head: Normocephalic and atraumatic.      Nose: Nose normal.   Eyes:      Extraocular Movements: Extraocular movements intact.      Conjunctiva/sclera: Conjunctivae normal.      Pupils: Pupils are equal, round, and reactive to light.   Neck:      Musculoskeletal: Neck supple.   Cardiovascular:      Rate and Rhythm: Normal rate and regular rhythm.   Pulmonary:      Effort: Pulmonary effort is normal.      Comments: Decreased air entry at the bases  Abdominal:      General: Bowel " sounds are normal.      Palpations: Abdomen is soft.      Tenderness: There is no abdominal tenderness.   Musculoskeletal: Normal range of motion.   Skin:     General: Skin is warm and dry.   Neurological:      Mental Status: She is alert and oriented to person, place, and time.   Psychiatric:         Mood and Affect: Mood normal.               Procedures:              Results Review:     I reviewed the patient's new clinical results.      Lab Results (last 24 hours)     Procedure Component Value Units Date/Time    Blood Culture - Blood, Arm, Left [[id number]] Collected: 09/19/20 0937    Specimen: Blood from Arm, Left Updated: 09/21/20 1000     Blood Culture No growth at 2 days    Blood Culture - Blood, Arm, Right [[id number]] Collected: 09/19/20 0937    Specimen: Blood from Arm, Right Updated: 09/21/20 1000     Blood Culture No growth at 2 days    Basic Metabolic Panel [[id number]]  (Abnormal) Collected: 09/21/20 0819    Specimen: Blood Updated: 09/21/20 0927     Glucose 132 mg/dL      BUN --     Comment: Testing performed by alternate method        Creatinine 0.63 mg/dL      Sodium 140 mmol/L      Potassium 4.0 mmol/L      Comment: Slight hemolysis detected by analyzer. Results may be affected.        Chloride 104 mmol/L      CO2 28.0 mmol/L      Calcium 7.9 mg/dL      eGFR Non African Amer 95 mL/min/1.73      BUN/Creatinine Ratio --     Comment: Testing not performed        Anion Gap 8.0 mmol/L     Narrative:      GFR Normal >60  Chronic Kidney Disease <60  Kidney Failure <15      BUN [[id number]] Collected: 09/21/20 0819    Specimen: Blood Updated: 09/21/20 0927    CBC & Differential [[id number]]  (Abnormal) Collected: 09/21/20 0819    Specimen: Blood Updated: 09/21/20 0910    Narrative:      The following orders were created for panel order CBC & Differential.  Procedure                               Abnormality         Status                     ---------                               -----------         ------                      CBC Auto Differential[[id number]]        Abnormal            Final result                 Please view results for these tests on the individual orders.    CBC Auto Differential [[id number]]  (Abnormal) Collected: 09/21/20 0819    Specimen: Blood Updated: 09/21/20 0910     WBC 6.00 10*3/mm3      RBC 4.39 10*6/mm3      Hemoglobin 13.8 g/dL      Hematocrit 41.9 %      MCV 95.3 fL      MCH 31.3 pg      MCHC 32.9 g/dL      RDW 14.0 %      RDW-SD 46.4 fl      MPV 7.7 fL      Platelets 277 10*3/mm3      Neutrophil % 75.7 %      Lymphocyte % 13.3 %      Monocyte % 10.9 %      Eosinophil % 0.0 %      Basophil % 0.1 %      Neutrophils, Absolute 4.50 10*3/mm3      Lymphocytes, Absolute 0.80 10*3/mm3      Monocytes, Absolute 0.70 10*3/mm3      Eosinophils, Absolute 0.00 10*3/mm3      Basophils, Absolute 0.00 10*3/mm3      nRBC 0.1 /100 WBC     POC Glucose Once [[id number]]  (Abnormal) Collected: 09/21/20 0810    Specimen: Blood Updated: 09/21/20 0815     Glucose 133 mg/dL      Comment: Serial Number: [id number]:  [id number]       Comprehensive Metabolic Panel [[id number]]  (Abnormal) Collected: 09/21/20 0349    Specimen: Blood Updated: 09/21/20 0704     Glucose 141 mg/dL      BUN --     Comment: Testing performed by alternate method        Creatinine 0.59 mg/dL      Sodium 140 mmol/L      Potassium 4.2 mmol/L      Comment: Slight hemolysis detected by analyzer. Results may be affected.        Chloride 104 mmol/L      CO2 26.0 mmol/L      Calcium 7.9 mg/dL      Total Protein 5.4 g/dL      Albumin 2.90 g/dL      ALT (SGPT) 42 U/L      AST (SGOT) 52 U/L      Alkaline Phosphatase 64 U/L      Total Bilirubin 0.3 mg/dL      eGFR Non African Amer 103 mL/min/1.73      Globulin 2.5 gm/dL      A/G Ratio 1.2 g/dL      BUN/Creatinine Ratio --     Comment: Testing not performed        Anion Gap 10.0 mmol/L     Narrative:      GFR Normal >60  Chronic Kidney Disease <60  Kidney Failure <15      BUN [[id number]] Collected:  09/21/20 0349    Specimen: Blood Updated: 09/21/20 0704    POC Glucose Once [[id number]]  (Abnormal) Collected: 09/20/20 1926    Specimen: Blood Updated: 09/20/20 1927     Glucose 146 mg/dL      Comment: Serial Number: [id number]:  [id number]       POC Glucose Once [[id number]]  (Abnormal) Collected: 09/20/20 1606    Specimen: Blood Updated: 09/20/20 1607     Glucose 170 mg/dL      Comment: Serial Number: [id number]:  [id number]       POC Glucose Once [[id number]]  (Abnormal) Collected: 09/20/20 1118    Specimen: Blood Updated: 09/20/20 1120     Glucose 173 mg/dL      Comment: Serial Number: [id number]:  [id number]           No results found for: HGBA1C        Results from last 7 days   Lab Units 09/19/20  0959   PH, ARTERIAL pH units 7.471*   PO2 ART mm Hg 76.9*   PCO2, ARTERIAL mm Hg 31.8*   HCO3 ART mmol/L 23.2     No results found for: LIPASE  No results found for: CHOL, CHLPL, TRIG, HDL, LDL, LDLDIRECT    No results found for: INTRAOP, PREDX, FINALDX, COMDX    Microbiology Results (last 10 days)     Procedure Component Value - Date/Time    Respiratory Panel, PCR - Swab, Nasopharynx [[id number]]  (Normal) Collected: 09/19/20 1504    Lab Status: Final result Specimen: Swab from Nasopharynx Updated: 09/19/20 1651     ADENOVIRUS, PCR Not Detected     Coronavirus 229E Not Detected     Coronavirus HKU1 Not Detected     Coronavirus NL63 Not Detected     Coronavirus OC43 Not Detected     Human Metapneumovirus Not Detected     Human Rhinovirus/Enterovirus Not Detected     Influenza B PCR Not Detected     Parainfluenza Virus 1 Not Detected     Parainfluenza Virus 2 Not Detected     Parainfluenza Virus 3 Not Detected     Parainfluenza Virus 4 Not Detected     Bordetella pertussis pcr Not Detected     Influenza A H1 2009 PCR Not Detected     Chlamydophila pneumoniae PCR Not Detected     Mycoplasma pneumo by PCR Not Detected     Influenza A PCR Not Detected     Influenza A H3 Not Detected     Influenza A H1  Not Detected     RSV, PCR Not Detected     Bordetella parapertussis PCR Not Detected    Narrative:      The coronavirus on the RVP is NOT COVID-19 and is NOT indicative of infection with COVID-19.     Blood Culture - Blood, Arm, Left [[id number]] Collected: 09/19/20 0937    Lab Status: Preliminary result Specimen: Blood from Arm, Left Updated: 09/21/20 1000     Blood Culture No growth at 2 days    Blood Culture - Blood, Arm, Right [[id number]] Collected: 09/19/20 0937    Lab Status: Preliminary result Specimen: Blood from Arm, Right Updated: 09/21/20 1000     Blood Culture No growth at 2 days          ECG/EMG Results (most recent)     Procedure Component Value Units Date/Time    ECG 12 Lead [[id number]] Collected: 09/19/20 0944     Updated: 09/20/20 0831    Narrative:      HEART RATE= 110  bpm  RR Interval= 544  ms  MI Interval= 158  ms  P Horizontal Axis= 0  deg  P Front Axis= 61  deg  QRSD Interval= 84  ms  QT Interval= 319  ms  QRS Axis= 5  deg  T Wave Axis= 62  deg  - OTHERWISE NORMAL ECG -  Sinus tachycardia  No previous ECG available for comparison  Electronically Signed By: Reid Pollock (JAYNE) 20-Sep-2020 08:30:38  Date and Time of Study: 2020-09-19 09:44:09                    Xr Chest Ap    Result Date: 9/19/2020  Multifocal consolidation involving the right midlung and lung bases bilaterally consistent with multifocal pneumonia, worsened from 09/10/2020.  Electronically Signed By-Ramiro Villa On:9/19/2020 10:30 AM This report was finalized on [id number] by  Ramiro Villa .          Xrays, labs reviewed personally by physician.    Medication Review:   I have reviewed the patient's current medication list      Scheduled Meds  azithromycin, 250 mg, Oral, Q24H  budesonide-formoterol, 2 puff, Inhalation, BID - RT  cetirizine, 10 mg, Oral, Daily  dexamethasone, 6 mg, Oral, Daily With Breakfast  enoxaparin, 40 mg, Subcutaneous, Q24H  INV GS-5734 remdesivir in NS IVPB, 100 mg, Intravenous, Q24H  sodium chloride, 10  mL, Intravenous, Q12H        Meds Infusions  Pharmacy Consult - Remdesivir,   Pharmacy Consult - Steroid Insulin Protocol,         Meds PRN  •  acetaminophen **OR** acetaminophen **OR** acetaminophen  •  albuterol sulfate HFA  •  aluminum-magnesium hydroxide-simethicone  •  melatonin  •  nitroglycerin  •  ondansetron **OR** ondansetron  •  Pharmacy Consult - Remdesivir  •  Pharmacy Consult - Steroid Insulin Protocol  •  [COMPLETED] Insert peripheral IV **AND** sodium chloride  •  sodium chloride        Assessment/Plan   Assessment/Plan     Active Hospital Problems    Diagnosis  POA   • COVID-19 virus infection [U07.1]  Yes      Resolved Hospital Problems   No resolved problems to display.       MEDICAL DECISION MAKING COMPLEXITY BY PROBLEM:     Multifocal pneumonia due to COVID-19 infection  Tested positive for COVID-19 on 09/10/2020   respiratory viral panel insignificant  Chest x-ray reviewed-showed worsening of the focal pneumonia  on Decadron and remdesivir   Also started on azithromycin  Respiratory care with bronchodilator  Oxygen therapy and titration  Airborne and contact precaution initiated  Monitor inflammatory markers  Consult pulmonary repeat chest x-ray  May benefit from Actemra/convalescent plasma.  Blood cultures negative so far  Pending respiratory panel    Fever-most likely due to above  Resolved  Acute hypoxic respiratory failure on chronic respiratory failure  Currently on 11 L of oxygen via nonrebreather mask  Oxygen therapy and titration  Blood cultures negative so far     DVT prophylaxis with Lovenox     Obesity BMI 30.0    Mechanical Order History:     None      Pharmalogical Order History:      Ordered     Dose Route Frequency Stop    09/19/20 1358  enoxaparin (LOVENOX) syringe 40 mg      40 mg SC Every 24 Hours --                  Code Status -   Code Status and Medical Interventions:   Ordered at: 09/19/20 1358     Level Of Support Discussed With:    Patient     Code Status:    CPR      Medical Interventions (Level of Support Prior to Arrest):    Full       This patient has been examined with appropriate PPE. 20        Discharge Planning  Pending clinical progress          Electronically signed by Lenin Brooks MD, 20, 10:12 EDT.  [facility] Hospitalist Team        Electronically signed by Lenin Brooks MD at 20 1250          Consult Notes (last 24 hours) (Notes from 20 0730 through 20 0730)      Dario Dawson MD at 20 1606      Consult Orders    1. Inpatient Pulmonology Consult [[id number]] ordered by Lenin Brooks MD at 20 1014               PULMONARY/ CRITICAL CARE/ SLEEP MEDICINE CONSULT NOTE        Patient Name:  Monisha Batista    :  1955    Medical Record:  [id number]    REQUESTING PHYSICIAN    Bess Serrato MD    PRIMARY CARE PHYSICIAN     Polly Domingo MD    REASON FOR CONSULTATION    Monisha Batista is a 65 y.o. female who was referred for consultation for COVID 19 PNA.    65-year-old female with history of COPD on home oxygen 2.5 L at bedtime.  Admitted on 2020 with complaints of cough productive of brownish sputum, progressively worsening shortness of breath, fever and body aches.  Patient stated symptoms started about 10 days ago  and she had gone to an outlying urgent care facility where she was diagnosed with COVID-19 on 2020.  Since then her symptoms continue to worsen and she now has to use her home oxygen continuously. her  is also positive for COVID-19.  In the ED, temperature was 101.6 and patient 's oxygen saturation was 86% on room air   Chest x-ray done showed worsening multifocal pneumonia.        REVIEW OF SYSTEMS    Constitutional:  + fever   Eyes:  Denies change in visual acuity   HENT:  Denies nasal congestion or sore throat   Respiratory:  + cough or shortness of breath   Cardiovascular:  Denies chest pain or edema   GI:  Denies abdominal  pain, nausea, vomiting, bloody stools or diarrhea   :  Denies dysuria   Musculoskeletal:  Denies back pain or joint pain   Integument:  Denies rash   Neurologic:  Denies headache, focal weakness or sensory changes   Endocrine:  Denies polyuria or polydipsia   Lymphatic:  Denies swollen glands   Psychiatric:  Denies depression or anxiety     MEDICAL HISTORY    Past Medical History:   Diagnosis Date   • COPD (chronic obstructive pulmonary disease) (CMS/[facility])    • Sleep apnea         SURGICAL HISTORY    Past Surgical History:   Procedure Laterality Date   • TONSILLECTOMY     • TUBAL ABDOMINAL LIGATION          FAMILY HISTORY    No family history on file.    SOCIAL HISTORY    Social History     Tobacco Use   • Smoking status: Former Smoker   • Smokeless tobacco: Never Used   • Tobacco comment: quit    Substance Use Topics   • Alcohol use: Never     Frequency: Never        ALLERGIES    Allergies   Allergen Reactions   • Sulfa Antibiotics Anaphylaxis       MEDICATIONS    Scheduled Meds:azithromycin, 250 mg, Oral, Q24H  budesonide-formoterol, 2 puff, Inhalation, BID - RT  cetirizine, 10 mg, Oral, Daily  dexamethasone, 6 mg, Oral, Daily With Breakfast  enoxaparin, 40 mg, Subcutaneous, Q24H  INV [id number] remdesivir in NS IVPB, 100 mg, Intravenous, Q24H  [START ON 2020] pantoprazole, 40 mg, Oral, Q AM  sodium chloride, 10 mL, Intravenous, Q12H      Continuous Infusions:Pharmacy Consult - Remdesivir,   Pharmacy Consult - Steroid Insulin Protocol,       PRN Meds:.•  acetaminophen **OR** acetaminophen **OR** acetaminophen  •  albuterol sulfate HFA  •  aluminum-magnesium hydroxide-simethicone  •  melatonin  •  nitroglycerin  •  ondansetron **OR** ondansetron  •  Pharmacy Consult - Remdesivir  •  Pharmacy Consult - Steroid Insulin Protocol  •  [COMPLETED] Insert peripheral IV **AND** sodium chloride  •  sodium chloride      PHYSICAL EXAM    tMax 24 hrs:  Temp (24hrs), Av.7 °F (35.9 °C), Min:94.8 °F (34.9 °C), Max:97.7  °F (36.5 °C)    Vitals Ranges:  Temp:  [94.8 °F (34.9 °C)-97.7 °F (36.5 °C)] 96.7 °F (35.9 °C)  Heart Rate:  [63-84] 63  Resp:  [20-26] 20  BP: (108-116)/(65-73) 108/65  Intake and Output Last 3 Shifts:  I/O last 3 completed shifts:  In: 600 [P.O.:600]  Out: 200 [Urine:200]    Intake/Output Summary (Last 24 hours) at 9/21/2020 1607  Last data filed at 9/21/2020 1300  Gross per 24 hour   Intake 720 ml   Output 300 ml   Net 420 ml       Constitutional:  Obese, no acute distress  Eyes:  PERRL, conjunctiva normal   HENT:  Atraumatic, external ears normal, nose normal, oropharynx moist, no pharyngeal exudates.   Neck- normal range of motion, no tenderness, supple   Respiratory:  No respiratory distress, few rhonchi at bases   Cardiovascular:  Normal rate, normal rhythm, no edema  GI:  Soft, nondistended, normal bowel sounds, nontender, no organomegaly, no mass, no rebound, no guarding   :  No costovertebral angle tenderness   Musculoskeletal:  No edema, no tenderness, no deformities. Back- no tenderness  Integument:  Well hydrated, no rash   Lymphatic:  No lymphadenopathy noted   Neurologic:  Alert & oriented x 3, CN 2-12 normal, normal motor function, normal sensory function, no focal deficits noted   Psychiatric:  Speech and behavior appropriate     LABS    Lab Results (last 24 hours)     Procedure Component Value Units Date/Time    POC Glucose Once [[id number]]  (Abnormal) Collected: 09/21/20 1221    Specimen: Blood Updated: 09/21/20 1223     Glucose 155 mg/dL      Comment: Serial Number: [id number]:  [id number]       Lactate Dehydrogenase [[id number]]  (Abnormal) Collected: 09/21/20 0819    Specimen: Blood Updated: 09/21/20 1158      U/L      Comment: Specimen hemolyzed.  Results may be affected.       D-dimer, Quantitative [[id number]]  (Normal) Collected: 09/21/20 1022    Specimen: Blood Updated: 09/21/20 1112     D-Dimer, Quantitative 0.57 mg/L (FEU)     Narrative:      Reference  Range  --------------------------------------------------------------------     < 0.50   Negative Predictive Value  0.50-0.59   Indeterminate    >= 0.60   Probable VTE             A very low percentage of patients with DVT may yield D-Dimer results   below the cut-off of 0.50 mg/L FEU.  This is known to be more   prevalent in patients with distal DVT.             Results of this test should always be interpreted in conjunction with   the patient's medical history, clinical presentation and other   findings.  Clinical diagnosis should not be based on the result of   INNOVANCE D-Dimer alone.    BUN [[id number]]  (Normal) Collected: 09/21/20 0349    Specimen: Blood Updated: 09/21/20 1101     BUN 16 mg/dL     Ferritin [[id number]]  (Abnormal) Collected: 09/21/20 0819    Specimen: Blood Updated: 09/21/20 1051     Ferritin 1,254.00 ng/mL     Narrative:      Results may be falsely decreased if patient taking Biotin.      Blood Culture - Blood, Arm, Left [[id number]] Collected: 09/19/20 0937    Specimen: Blood from Arm, Left Updated: 09/21/20 1000     Blood Culture No growth at 2 days    Blood Culture - Blood, Arm, Right [[id number]] Collected: 09/19/20 0937    Specimen: Blood from Arm, Right Updated: 09/21/20 1000     Blood Culture No growth at 2 days    Basic Metabolic Panel [[id number]]  (Abnormal) Collected: 09/21/20 0819    Specimen: Blood Updated: 09/21/20 0927     Glucose 132 mg/dL      BUN --     Comment: Testing performed by alternate method        Creatinine 0.63 mg/dL      Sodium 140 mmol/L      Potassium 4.0 mmol/L      Comment: Slight hemolysis detected by analyzer. Results may be affected.        Chloride 104 mmol/L      CO2 28.0 mmol/L      Calcium 7.9 mg/dL      eGFR Non African Amer 95 mL/min/1.73      BUN/Creatinine Ratio --     Comment: Testing not performed        Anion Gap 8.0 mmol/L     Narrative:      GFR Normal >60  Chronic Kidney Disease <60  Kidney Failure <15      BUN [[id number]] Collected: 09/21/20  0819    Specimen: Blood Updated: 09/21/20 0927    CBC & Differential [[id number]]  (Abnormal) Collected: 09/21/20 0819    Specimen: Blood Updated: 09/21/20 0910    Narrative:      The following orders were created for panel order CBC & Differential.  Procedure                               Abnormality         Status                     ---------                               -----------         ------                     CBC Auto Differential[[id number]]        Abnormal            Final result                 Please view results for these tests on the individual orders.    CBC Auto Differential [[id number]]  (Abnormal) Collected: 09/21/20 0819    Specimen: Blood Updated: 09/21/20 0910     WBC 6.00 10*3/mm3      RBC 4.39 10*6/mm3      Hemoglobin 13.8 g/dL      Hematocrit 41.9 %      MCV 95.3 fL      MCH 31.3 pg      MCHC 32.9 g/dL      RDW 14.0 %      RDW-SD 46.4 fl      MPV 7.7 fL      Platelets 277 10*3/mm3      Neutrophil % 75.7 %      Lymphocyte % 13.3 %      Monocyte % 10.9 %      Eosinophil % 0.0 %      Basophil % 0.1 %      Neutrophils, Absolute 4.50 10*3/mm3      Lymphocytes, Absolute 0.80 10*3/mm3      Monocytes, Absolute 0.70 10*3/mm3      Eosinophils, Absolute 0.00 10*3/mm3      Basophils, Absolute 0.00 10*3/mm3      nRBC 0.1 /100 WBC     POC Glucose Once [[id number]]  (Abnormal) Collected: 09/21/20 0810    Specimen: Blood Updated: 09/21/20 0815     Glucose 133 mg/dL      Comment: Serial Number: [id number]:  [id number]       Comprehensive Metabolic Panel [[id number]]  (Abnormal) Collected: 09/21/20 0349    Specimen: Blood Updated: 09/21/20 0704     Glucose 141 mg/dL      BUN --     Comment: Testing performed by alternate method        Creatinine 0.59 mg/dL      Sodium 140 mmol/L      Potassium 4.2 mmol/L      Comment: Slight hemolysis detected by analyzer. Results may be affected.        Chloride 104 mmol/L      CO2 26.0 mmol/L      Calcium 7.9 mg/dL      Total Protein 5.4 g/dL      Albumin 2.90 g/dL       ALT (SGPT) 42 U/L      AST (SGOT) 52 U/L      Alkaline Phosphatase 64 U/L      Total Bilirubin 0.3 mg/dL      eGFR Non African Amer 103 mL/min/1.73      Globulin 2.5 gm/dL      A/G Ratio 1.2 g/dL      BUN/Creatinine Ratio --     Comment: Testing not performed        Anion Gap 10.0 mmol/L     Narrative:      GFR Normal >60  Chronic Kidney Disease <60  Kidney Failure <15      POC Glucose Once [[id number]]  (Abnormal) Collected: 09/20/20 1926    Specimen: Blood Updated: 09/20/20 1927     Glucose 146 mg/dL      Comment: Serial Number: [id number]:  [id number]       POC Glucose Once [[id number]]  (Abnormal) Collected: 09/20/20 1606    Specimen: Blood Updated: 09/20/20 1607     Glucose 170 mg/dL      Comment: Serial Number: [id number]:  [id number]            Microbiology Results (last 10 days)     Procedure Component Value - Date/Time    Respiratory Panel, PCR - Swab, Nasopharynx [[id number]]  (Normal) Collected: 09/19/20 1504    Lab Status: Final result Specimen: Swab from Nasopharynx Updated: 09/19/20 1651     ADENOVIRUS, PCR Not Detected     Coronavirus 229E Not Detected     Coronavirus HKU1 Not Detected     Coronavirus NL63 Not Detected     Coronavirus OC43 Not Detected     Human Metapneumovirus Not Detected     Human Rhinovirus/Enterovirus Not Detected     Influenza B PCR Not Detected     Parainfluenza Virus 1 Not Detected     Parainfluenza Virus 2 Not Detected     Parainfluenza Virus 3 Not Detected     Parainfluenza Virus 4 Not Detected     Bordetella pertussis pcr Not Detected     Influenza A H1 2009 PCR Not Detected     Chlamydophila pneumoniae PCR Not Detected     Mycoplasma pneumo by PCR Not Detected     Influenza A PCR Not Detected     Influenza A H3 Not Detected     Influenza A H1 Not Detected     RSV, PCR Not Detected     Bordetella parapertussis PCR Not Detected    Narrative:      The coronavirus on the RVP is NOT COVID-19 and is NOT indicative of infection with COVID-19.     Blood Culture - Blood,  Arm, Left [[id number]] Collected: 09/19/20 0937    Lab Status: Preliminary result Specimen: Blood from Arm, Left Updated: 09/21/20 1000     Blood Culture No growth at 2 days    Blood Culture - Blood, Arm, Right [[id number]] Collected: 09/19/20 0937    Lab Status: Preliminary result Specimen: Blood from Arm, Right Updated: 09/21/20 1000     Blood Culture No growth at 2 days         CBC  Results from last 7 days   Lab Units 09/21/20  0819 09/20/20  0322 09/19/20 0937   WBC 10*3/mm3 6.00 3.50 6.20   RBC 10*6/mm3 4.39 4.43 4.65   HEMOGLOBIN g/dL 13.8 14.0 14.7   HEMATOCRIT % 41.9 41.2 43.2   MCV fL 95.3 93.0 92.9   PLATELETS 10*3/mm3 277 207 204       BMP  Results from last 7 days   Lab Units 09/21/20  0819 09/21/20  0349 09/20/20 0322 09/19/20  1009   SODIUM mmol/L 140 140 138 133*   POTASSIUM mmol/L 4.0 4.2 3.9 3.6   CHLORIDE mmol/L 104 104 102 97*   CO2 mmol/L 28.0 26.0 26.0 25.0   BUN   --  16 10 7*   CREATININE mg/dL 0.63 0.59 0.53* 0.57   GLUCOSE mg/dL 132* 141* 129* 118*       CMP   Results from last 7 days   Lab Units 09/21/20  0819 09/21/20  0349 09/20/20 0322 09/19/20  1009   SODIUM mmol/L 140 140 138 133*   POTASSIUM mmol/L 4.0 4.2 3.9 3.6   CHLORIDE mmol/L 104 104 102 97*   CO2 mmol/L 28.0 26.0 26.0 25.0   BUN   --  16 10 7*   CREATININE mg/dL 0.63 0.59 0.53* 0.57   GLUCOSE mg/dL 132* 141* 129* 118*   ALBUMIN g/dL  --  2.90* 3.10* 3.30*   BILIRUBIN mg/dL  --  0.3 0.2 0.3   ALK PHOS U/L  --  64 64 61   AST (SGOT) U/L  --  52* 63* 67*   ALT (SGPT) U/L  --  42* 45* 45*       TROPONIN  Results from last 7 days   Lab Units 09/19/20  1009   TROPONIN T ng/mL <0.010       CoAg        Creatinine Clearance  Estimated Creatinine Clearance: 104.2 mL/min (by C-G formula based on SCr of 0.63 mg/dL).    ABG  Results from last 7 days   Lab Units 09/19/20  0959   PH, ARTERIAL pH units 7.471*   PCO2, ARTERIAL mm Hg 31.8*   PO2 ART mm Hg 76.9*   O2 SATURATION ART % 96.2   BASE EXCESS ART mmol/L 0.4     IMAGING & OTHER STUDIES     Imaging Results (Last 72 Hours)     Procedure Component Value Units Date/Time    XR Chest 1 View [[id number]] Collected: 09/21/20 1110     Updated: 09/21/20 1113    Narrative:      EXAMINATION: XR CHEST 1 VW-     DATE OF EXAM: 9/21/2020 11:02 AM     INDICATION: Per MD; U07.1-COVID-19; R09.02-Hypoxemia; R50.9-Fever,  unspecified; J18.9-Pneumonia, unspecified organism.     COMPARISON: Chest radiograph dated 9/19/2020     TECHNIQUE: Portable AP view of the chest was obtained.     FINDINGS:  The cardiomediastinal silhouette is within normal limits. There is  aortic arch atherosclerotic calcification. There are bilateral airspace  opacities within the inferior right upper lobe and bilateral lung bases.  These appear slightly improved from the prior examination. There is no  significant pleural effusion or pneumothorax. There are degenerative  changes of the thoracic spine.       Impression:      1. Slight improvement of the bilateral airspace opacities likely  representing pneumonia.     Electronically Signed By-Flaquito Lewis On:9/21/2020 11:11 AM  This report was finalized on [id number] by  Flaquito Lewis, .    XR Chest AP [[id number]] Collected: 09/19/20 1029     Updated: 09/19/20 1032    Narrative:         DATE OF EXAM:   9/19/2020 10:10 AM     PROCEDURE:   XR CHEST AP-     INDICATIONS:   COVID Evaluation, Cough, Fever; U07.1-COVID-19; R09.02-Hypoxemia;  R50.9-Fever, unspecified     COMPARISON:  09/10/2020     TECHNIQUE:   Portable chest radiograph.     FINDINGS:    There are scattered multifocal areas of airspace disease involving the  right midlung and the lung bases bilaterally which are worsened from the  prior study. No pneumothorax. No large pleural effusion. Aortic arch  atherosclerosis. Osseous structures intact.       Impression:      Multifocal consolidation involving the right midlung and lung bases  bilaterally consistent with multifocal pneumonia, worsened from  09/10/2020.     Electronically  Signed By-Ramiro Villa On:9/19/2020 10:30 AM  This report was finalized on [id number] by  Ramiro Villa, .          ASSESSMENT      Severe COVID-19 virus infection  - Increase Dexamethasone to BID given worsening severe hypoxia.   - Continue Remdesivir.  - Give Convalescent plasma.  - Continue Lovenox.     Multifocal PNA- HCAP  - Elevated procalcitonin.   - Start Cefepime empirically.  - Check sputum cx and MRSA nares.    Acute hypoxic respiratpory failure  - Continue O2 supplementation to keep O2 sat >92%  - Check BNP     DVT ppy Lovenox    The FDA has authorized the emergency use of COVID-19 convalescent plasma, which is not an FDA approved biological product. Discussions with the patient/patient caregiver regarding the risks and benefits of COVID-19 convalescent plasma have occurred. The patient/patient caregiver verbalizes recognition that this is an investigational treatment which may offer significant known and potential benefits and risks, the extent of which are unknown. Information on available alternative treatments and the risks and benefits of those alternatives was discussed. “Fact Sheet for Patients and Parents/Caregivers” was discussed with the patient/patient caregiver. All questions were answered to satisfaction and the patient/patient caregiver has the option to accept or refuse administration of COVID-19 convalescent plasma and would like to accept treatment.      Critically ill CC time 32 min   I thank you for this opportunity to take part in this patient's care and will follow the patient along with you.      Electronically signed by Dario Dawson MD at 09/21/20 9527

## 2020-09-22 NOTE — PROGRESS NOTES
[facility] Medicine Services Daily Progress Note      Hospitalist Team  LOS 3 days      Patient Care Team:  Polly Domingo MD as PCP - General (Family Medicine)  Bess Serrato MD as PCP - Claims Attributed  Bess Serrato MD as Consulting Physician (Pulmonary Disease)    Patient Location: 201/1      Subjective   Subjective     Chief Complaint / Subjective  Chief Complaint   Patient presents with   • Fever         Brief Synopsis of Hospital Course/HPI  Patient is a 65-year-old female with history of COPD on home oxygen 2.5 L at bedtime.  Presented to the emergency room on 9/20/2020 with complaints of cough productive of brownish sputum, progressively worsening shortness of breath and body aches.  Patient stated symptoms started about 10 days ago  and she had gone to an outlying urgent care facility where she was diagnosed with COVID-19 on 9/11/2020.  Since then her symptoms continue to worsen and she now has to use her home oxygen continuously.  Related having subjective fever and chills  Of note is that her  is also positive for COVID-19.  In the ED, temperature was 101.6 and patient 's oxygen saturation was 86% on room air improved to 93% with 5 L of oxygen via nasal cannula.  Chest x-ray done showed worsening multifocal pneumonia.     Patient was admitted for further care.      Date::    9/20/2020  Patient seen and examined  Shortness of breath continues  Currently on nonrebreather mask 11 L and saturating mid 90s    9/21/2020  Patient reports that she feels better but her oxygen requirements are increasing up to 15 L.  Repeat chest x-ray ordered and pulmonologist consulted.  May benefit from convalescent plasma/Actemra.    Nitin other systems reviewed and negative      Objective   Objective      Vital Signs  Temp:  [94.9 °F (34.9 °C)-97.3 °F (36.3 °C)] 97 °F (36.1 °C)  Heart Rate:  [63-81] 72  Resp:  [18-24] 18  BP: (118-133)/(63-74) 131/74  Oxygen Therapy  SpO2: 96  "%  Pulse Oximetry Type: Continuous  Device (Oxygen Therapy): nonrebreather mask  Flow (L/min): 8  Oxygen Concentration (%): 100  Flowsheet Rows      First Filed Value   Admission Height  162.6 cm (64\") Documented at 09/19/2020 0912   Admission Weight  101 kg (221 lb 9 oz) Documented at 09/19/2020 0912        Intake & Output (last 3 days)       09/19 0701 - 09/20 0700 09/20 0701 - 09/21 0700 09/21 0701 - 09/22 0700 09/22 0701 - 09/23 0700    P.O.  600    Total Intake(mL/kg) 120 (1.2) 600 (5.9) 1080 (10.1) 600 (5.6)    Urine (mL/kg/hr)  200 (0.1) 400 (0.2) 350 (0.5)    Total Output  200 400 350    Net +120 +400 +680 +250            Urine Unmeasured Occurrence  2 x 1 x     Stool Unmeasured Occurrence 1 x           Lines, Drains & Airways    Active LDAs     Name:   Placement date:   Placement time:   Site:   Days:    Peripheral IV 09/19/20 0939 Right Antecubital   09/19/20 0939    Antecubital   1                  Physical Exam:    Physical Exam  Vitals signs reviewed.   Constitutional:       Comments: Moderate respiratory distress   HENT:      Head: Normocephalic and atraumatic.      Nose: Nose normal.   Eyes:      Extraocular Movements: Extraocular movements intact.      Conjunctiva/sclera: Conjunctivae normal.      Pupils: Pupils are equal, round, and reactive to light.   Neck:      Musculoskeletal: Neck supple.   Cardiovascular:      Rate and Rhythm: Normal rate and regular rhythm.   Pulmonary:      Effort: Pulmonary effort is normal.      Comments: Decreased air entry at the bases  Abdominal:      General: Bowel sounds are normal.      Palpations: Abdomen is soft.      Tenderness: There is no abdominal tenderness.   Musculoskeletal: Normal range of motion.   Skin:     General: Skin is warm and dry.   Neurological:      Mental Status: She is alert and oriented to person, place, and time.   Psychiatric:         Mood and Affect: Mood normal.               Procedures:              Results Review:     I " reviewed the patient's new clinical results.      Lab Results (last 24 hours)     Procedure Component Value Units Date/Time    POC Glucose Once [[id number]]  (Abnormal) Collected: 09/22/20 1113    Specimen: Blood Updated: 09/22/20 1116     Glucose 138 mg/dL      Comment: Serial Number: [id number]:  [id number]       BUN [[id number]]  (Normal) Collected: 09/22/20 0530    Specimen: Blood Updated: 09/22/20 1101     BUN 20 mg/dL     Blood Culture - Blood, Arm, Left [[id number]] Collected: 09/19/20 0937    Specimen: Blood from Arm, Left Updated: 09/22/20 0946     Blood Culture No growth at 3 days    Blood Culture - Blood, Arm, Right [[id number]] Collected: 09/19/20 0937    Specimen: Blood from Arm, Right Updated: 09/22/20 0946     Blood Culture No growth at 3 days    POC Glucose Once [[id number]]  (Abnormal) Collected: 09/22/20 0704    Specimen: Blood Updated: 09/22/20 0705     Glucose 128 mg/dL      Comment: Serial Number: [id number]:  [id number]       D-dimer, Quantitative [[id number]]  (Abnormal) Collected: 09/22/20 0530    Specimen: Blood Updated: 09/22/20 0638     D-Dimer, Quantitative 0.61 mg/L (FEU)     Narrative:      Reference Range  --------------------------------------------------------------------     < 0.50   Negative Predictive Value  0.50-0.59   Indeterminate    >= 0.60   Probable VTE             A very low percentage of patients with DVT may yield D-Dimer results   below the cut-off of 0.50 mg/L FEU.  This is known to be more   prevalent in patients with distal DVT.             Results of this test should always be interpreted in conjunction with   the patient's medical history, clinical presentation and other   findings.  Clinical diagnosis should not be based on the result of   INNOVANCE D-Dimer alone.    Fibrinogen [[id number]]  (Normal) Collected: 09/22/20 0530    Specimen: Blood Updated: 09/22/20 0638     Fibrinogen 407 mg/dL     Ferritin [[id number]]  (Abnormal) Collected: 09/22/20 0530     Specimen: Blood Updated: 09/22/20 0637     Ferritin 999.00 ng/mL     Narrative:      Results may be falsely decreased if patient taking Biotin.      Comprehensive Metabolic Panel [[id number]]  (Abnormal) Collected: 09/22/20 0530    Specimen: Blood Updated: 09/22/20 0636     Glucose 145 mg/dL      BUN --     Comment: Testing performed by alternate method        Creatinine 0.63 mg/dL      Sodium 142 mmol/L      Potassium 4.4 mmol/L      Chloride 105 mmol/L      CO2 30.0 mmol/L      Calcium 8.1 mg/dL      Total Protein 5.5 g/dL      Albumin 3.10 g/dL      ALT (SGPT) 48 U/L      AST (SGOT) 47 U/L      Alkaline Phosphatase 74 U/L      Total Bilirubin 0.3 mg/dL      eGFR Non African Amer 95 mL/min/1.73      Globulin 2.4 gm/dL      A/G Ratio 1.3 g/dL      BUN/Creatinine Ratio --     Comment: Testing not performed        Anion Gap 7.0 mmol/L     Narrative:      GFR Normal >60  Chronic Kidney Disease <60  Kidney Failure <15      CK [[id number]]  (Normal) Collected: 09/22/20 0530    Specimen: Blood Updated: 09/22/20 0636     Creatine Kinase 97 U/L     C-reactive Protein [[id number]]  (Abnormal) Collected: 09/22/20 0530    Specimen: Blood Updated: 09/22/20 0636     C-Reactive Protein 2.05 mg/dL     Lactate Dehydrogenase [[id number]]  (Abnormal) Collected: 09/22/20 0530    Specimen: Blood Updated: 09/22/20 0636      U/L     CBC & Differential [[id number]]  (Abnormal) Collected: 09/22/20 0530    Specimen: Blood Updated: 09/22/20 0616    Narrative:      The following orders were created for panel order CBC & Differential.  Procedure                               Abnormality         Status                     ---------                               -----------         ------                     CBC Auto Differential[[id number]]        Abnormal            Final result                 Please view results for these tests on the individual orders.    CBC Auto Differential [[id number]]  (Abnormal) Collected: 09/22/20 0530    Specimen:  Blood Updated: 09/22/20 0616     WBC 6.50 10*3/mm3      RBC 4.34 10*6/mm3      Hemoglobin 13.6 g/dL      Hematocrit 41.0 %      MCV 94.3 fL      MCH 31.4 pg      MCHC 33.3 g/dL      RDW 14.1 %      RDW-SD 46.8 fl      MPV 7.8 fL      Platelets 292 10*3/mm3      Neutrophil % 79.8 %      Lymphocyte % 9.7 %      Monocyte % 10.3 %      Eosinophil % 0.0 %      Basophil % 0.2 %      Neutrophils, Absolute 5.20 10*3/mm3      Lymphocytes, Absolute 0.60 10*3/mm3      Monocytes, Absolute 0.70 10*3/mm3      Eosinophils, Absolute 0.00 10*3/mm3      Basophils, Absolute 0.00 10*3/mm3      nRBC 0.1 /100 WBC     POC Glucose Once [[id number]]  (Abnormal) Collected: 09/21/20 2037    Specimen: Blood Updated: 09/21/20 2038     Glucose 153 mg/dL      Comment: Serial Number: [id number]:  [id number]       BNP [[id number]]  (Normal) Collected: 09/21/20 0819    Specimen: Blood Updated: 09/21/20 1916     proBNP 516.2 pg/mL     Narrative:      Among patients with dyspnea, NT-proBNP is highly sensitive for the detection of acute congestive heart failure. In addition NT-proBNP of <300 pg/ml effectively rules out acute congestive heart failure with 99% negative predictive value.    Results may be falsely decreased if patient taking Biotin.      POC Glucose Once [[id number]]  (Abnormal) Collected: 09/21/20 1708    Specimen: Blood Updated: 09/21/20 1709     Glucose 178 mg/dL      Comment: Serial Number: [id number]:  [id number]       BUN [[id number]]  (Normal) Collected: 09/21/20 0819    Specimen: Blood Updated: 09/21/20 1609     BUN 16 mg/dL         No results found for: HGBA1C        Results from last 7 days   Lab Units 09/19/20  0959   PH, ARTERIAL pH units 7.471*   PO2 ART mm Hg 76.9*   PCO2, ARTERIAL mm Hg 31.8*   HCO3 ART mmol/L 23.2     No results found for: LIPASE  No results found for: CHOL, CHLPL, TRIG, HDL, LDL, LDLDIRECT    No results found for: INTRAOP, PREDX, FINALDX, COMDX    Microbiology Results (last 10 days)     Procedure  Component Value - Date/Time    Respiratory Panel, PCR - Swab, Nasopharynx [[id number]]  (Normal) Collected: 09/19/20 1504    Lab Status: Final result Specimen: Swab from Nasopharynx Updated: 09/19/20 1651     ADENOVIRUS, PCR Not Detected     Coronavirus 229E Not Detected     Coronavirus HKU1 Not Detected     Coronavirus NL63 Not Detected     Coronavirus OC43 Not Detected     Human Metapneumovirus Not Detected     Human Rhinovirus/Enterovirus Not Detected     Influenza B PCR Not Detected     Parainfluenza Virus 1 Not Detected     Parainfluenza Virus 2 Not Detected     Parainfluenza Virus 3 Not Detected     Parainfluenza Virus 4 Not Detected     Bordetella pertussis pcr Not Detected     Influenza A H1 2009 PCR Not Detected     Chlamydophila pneumoniae PCR Not Detected     Mycoplasma pneumo by PCR Not Detected     Influenza A PCR Not Detected     Influenza A H3 Not Detected     Influenza A H1 Not Detected     RSV, PCR Not Detected     Bordetella parapertussis PCR Not Detected    Narrative:      The coronavirus on the RVP is NOT COVID-19 and is NOT indicative of infection with COVID-19.     Blood Culture - Blood, Arm, Left [[id number]] Collected: 09/19/20 0937    Lab Status: Preliminary result Specimen: Blood from Arm, Left Updated: 09/22/20 0946     Blood Culture No growth at 3 days    Blood Culture - Blood, Arm, Right [[id number]] Collected: 09/19/20 0937    Lab Status: Preliminary result Specimen: Blood from Arm, Right Updated: 09/22/20 0946     Blood Culture No growth at 3 days          ECG/EMG Results (most recent)     Procedure Component Value Units Date/Time    ECG 12 Lead [[id number]] Collected: 09/19/20 0944     Updated: 09/20/20 0831    Narrative:      HEART RATE= 110  bpm  RR Interval= 544  ms  TX Interval= 158  ms  P Horizontal Axis= 0  deg  P Front Axis= 61  deg  QRSD Interval= 84  ms  QT Interval= 319  ms  QRS Axis= 5  deg  T Wave Axis= 62  deg  - OTHERWISE NORMAL ECG -  Sinus tachycardia  No previous ECG  available for comparison  Electronically Signed By: Reid Pollock (JAYNE) 20-Sep-2020 08:30:38  Date and Time of Study: 2020-09-19 09:44:09                    Xr Chest 1 View    Result Date: 9/21/2020  1. Slight improvement of the bilateral airspace opacities likely representing pneumonia.  Electronically Signed By-Flaquito Lewis On:9/21/2020 11:11 AM This report was finalized on [id number] by  Flaquito Lewis, .    Xr Chest Ap    Result Date: 9/19/2020  Multifocal consolidation involving the right midlung and lung bases bilaterally consistent with multifocal pneumonia, worsened from 09/10/2020.  Electronically Signed By-Ramiro Villa On:9/19/2020 10:30 AM This report was finalized on [id number] by  Ramiro Villa .          Xrays, labs reviewed personally by physician.    Medication Review:   I have reviewed the patient's current medication list      Scheduled Meds  azithromycin, 250 mg, Oral, Q24H  budesonide-formoterol, 2 puff, Inhalation, BID - RT  cefepime, 2 g, Intravenous, Q8H  cetirizine, 10 mg, Oral, Daily  dexamethasone, 6 mg, Intravenous, BID  enoxaparin, 40 mg, Subcutaneous, Q24H  INV [id number] remdesivir in NS IVPB, 100 mg, Intravenous, Q24H  pantoprazole, 40 mg, Oral, Q AM  sodium chloride, 10 mL, Intravenous, Q12H        Meds Infusions  Pharmacy Consult - Remdesivir,   Pharmacy Consult - Steroid Insulin Protocol,   Pharmacy to Dose Cefepime,         Meds PRN  •  acetaminophen **OR** acetaminophen **OR** acetaminophen  •  albuterol sulfate HFA  •  aluminum-magnesium hydroxide-simethicone  •  melatonin  •  nitroglycerin  •  ondansetron **OR** ondansetron  •  Pharmacy Consult - Remdesivir  •  Pharmacy Consult - Steroid Insulin Protocol  •  Pharmacy to Dose Cefepime  •  [COMPLETED] Insert peripheral IV **AND** sodium chloride  •  sodium chloride        Assessment/Plan   Assessment/Plan     Active Hospital Problems    Diagnosis  POA   • COVID-19 virus infection [U07.1]  Yes      Resolved Hospital  Problems   No resolved problems to display.       MEDICAL DECISION MAKING COMPLEXITY BY PROBLEM:     Multifocal pneumonia due to COVID-19 infection  Tested positive for COVID-19 on 09/10/2020   respiratory viral panel insignificant  Chest x-ray reviewed-showed worsening of the focal pneumonia  on Decadron and remdesivir   Also started on azithromycin  Respiratory care with bronchodilator  Oxygen therapy and titration  Airborne and contact precaution initiated  Monitor inflammatory markers  Consult pulmonary repeat chest x-ray  Patient is saturating 93% on 10 L nonrebreather.  She has been agreeable on convalescent plasma transfusion.  She has not received that yet.  She feels is relatively stable.  Her LDH is normalizing now.  Her d-dimer slightly high 0.61 and ferritin is better than yesterday 999  Normal liver enzymes slightly elevated \  dexamethasone was increased as well by and started on cefepime.  Pulmonologist      Fever-most likely due to above  Resolved  Acute hypoxic respiratory failure on chronic respiratory failure  Currently on 11 L of oxygen via nonrebreather mask  Oxygen therapy and titration  Blood cultures negative so far     DVT prophylaxis with Lovenox     Obesity BMI 30.0    Mechanical Order History:     None      Pharmalogical Order History:      Ordered     Dose Route Frequency Stop    09/19/20 1358  enoxaparin (LOVENOX) syringe 40 mg      40 mg SC Every 24 Hours --                  Code Status -   Code Status and Medical Interventions:   Ordered at: 09/19/20 1358     Level Of Support Discussed With:    Patient     Code Status:    CPR     Medical Interventions (Level of Support Prior to Arrest):    Full       This patient has been examined with appropriate PPE. 09/22/20        Discharge Planning  Pending clinical progress          Electronically signed by Lenin Brooks MD, 09/22/20, 13:21 EDT.  [facility] Hospitalist Team

## 2020-09-22 NOTE — PLAN OF CARE
Problem: Adult Inpatient Plan of Care  Goal: Plan of Care Review  Outcome: Ongoing, Progressing  Flowsheets  Taken 9/22/2020 0425 by Michael Booth LPN  Progress: no change  Outcome Summary: Patient rested comfortably all night with no complaints or concerns. Patient to received convalescent plasma infusion today.  Taken 9/20/2020 1250 by Mauro Cullen RN  Plan of Care Reviewed With: patient   Goal Outcome Evaluation:  Plan of Care Reviewed With: patient  Progress: no change  Outcome Summary: Patient rested comfortably all night with no complaints or concerns. Patient to received convalescent plasma infusion today.

## 2020-09-23 LAB
ALBUMIN SERPL-MCNC: 3.1 G/DL (ref 3.5–5.2)
ALBUMIN/GLOB SERPL: 1.3 G/DL
ALP SERPL-CCNC: 81 U/L (ref 39–117)
ALT SERPL W P-5'-P-CCNC: 73 U/L (ref 1–33)
ANION GAP SERPL CALCULATED.3IONS-SCNC: 10 MMOL/L (ref 5–15)
AST SERPL-CCNC: 52 U/L (ref 1–32)
BASOPHILS # BLD AUTO: 0 10*3/MM3 (ref 0–0.2)
BASOPHILS NFR BLD AUTO: 0.1 % (ref 0–1.5)
BH BB BLOOD EXPIRATION DATE: NORMAL
BH BB BLOOD EXPIRATION DATE: NORMAL
BH BB BLOOD TYPE BARCODE: 5100
BH BB BLOOD TYPE BARCODE: 5100
BH BB DISPENSE STATUS: NORMAL
BH BB DISPENSE STATUS: NORMAL
BH BB PRODUCT CODE: NORMAL
BH BB PRODUCT CODE: NORMAL
BH BB UNIT NUMBER: NORMAL
BH BB UNIT NUMBER: NORMAL
BILIRUB SERPL-MCNC: 0.4 MG/DL (ref 0–1.2)
BUN SERPL-MCNC: 20 MG/DL (ref 8–23)
BUN SERPL-MCNC: ABNORMAL MG/DL
BUN/CREAT SERPL: ABNORMAL
CALCIUM SPEC-SCNC: 7.8 MG/DL (ref 8.6–10.5)
CHLORIDE SERPL-SCNC: 106 MMOL/L (ref 98–107)
CK SERPL-CCNC: 82 U/L (ref 20–180)
CO2 SERPL-SCNC: 26 MMOL/L (ref 22–29)
CREAT SERPL-MCNC: 0.69 MG/DL (ref 0.57–1)
CRP SERPL-MCNC: 1.22 MG/DL (ref 0–0.5)
DEPRECATED RDW RBC AUTO: 45.1 FL (ref 37–54)
EOSINOPHIL # BLD AUTO: 0 10*3/MM3 (ref 0–0.4)
EOSINOPHIL NFR BLD AUTO: 0 % (ref 0.3–6.2)
ERYTHROCYTE [DISTWIDTH] IN BLOOD BY AUTOMATED COUNT: 13.8 % (ref 12.3–15.4)
FERRITIN SERPL-MCNC: 1422 NG/ML (ref 13–150)
GFR SERPL CREATININE-BSD FRML MDRD: 86 ML/MIN/1.73
GLOBULIN UR ELPH-MCNC: 2.4 GM/DL
GLUCOSE BLDC GLUCOMTR-MCNC: 129 MG/DL (ref 70–105)
GLUCOSE BLDC GLUCOMTR-MCNC: 141 MG/DL (ref 70–105)
GLUCOSE BLDC GLUCOMTR-MCNC: 158 MG/DL (ref 70–105)
GLUCOSE BLDC GLUCOMTR-MCNC: 194 MG/DL (ref 70–105)
GLUCOSE SERPL-MCNC: 152 MG/DL (ref 65–99)
HCT VFR BLD AUTO: 39.8 % (ref 34–46.6)
HGB BLD-MCNC: 13.4 G/DL (ref 12–15.9)
LYMPHOCYTES # BLD AUTO: 0.6 10*3/MM3 (ref 0.7–3.1)
LYMPHOCYTES NFR BLD AUTO: 8.2 % (ref 19.6–45.3)
MCH RBC QN AUTO: 31.7 PG (ref 26.6–33)
MCHC RBC AUTO-ENTMCNC: 33.7 G/DL (ref 31.5–35.7)
MCV RBC AUTO: 94.1 FL (ref 79–97)
MONOCYTES # BLD AUTO: 0.7 10*3/MM3 (ref 0.1–0.9)
MONOCYTES NFR BLD AUTO: 9.4 % (ref 5–12)
NEUTROPHILS NFR BLD AUTO: 6.5 10*3/MM3 (ref 1.7–7)
NEUTROPHILS NFR BLD AUTO: 82.3 % (ref 42.7–76)
NRBC BLD AUTO-RTO: 0.1 /100 WBC (ref 0–0.2)
PLATELET # BLD AUTO: 334 10*3/MM3 (ref 140–450)
PMV BLD AUTO: 8 FL (ref 6–12)
POTASSIUM SERPL-SCNC: 4.3 MMOL/L (ref 3.5–5.2)
PROT SERPL-MCNC: 5.5 G/DL (ref 6–8.5)
RBC # BLD AUTO: 4.23 10*6/MM3 (ref 3.77–5.28)
SODIUM SERPL-SCNC: 142 MMOL/L (ref 136–145)
UNIT  ABO: NORMAL
UNIT  ABO: NORMAL
UNIT  RH: NORMAL
UNIT  RH: NORMAL
WBC # BLD AUTO: 7.9 10*3/MM3 (ref 3.4–10.8)

## 2020-09-23 PROCEDURE — 25010000002 DEXAMETHASONE SODIUM PHOSPHATE 10 MG/ML SOLUTION: Performed by: INTERNAL MEDICINE

## 2020-09-23 PROCEDURE — 25010000002 ENOXAPARIN PER 10 MG: Performed by: INTERNAL MEDICINE

## 2020-09-23 PROCEDURE — 80053 COMPREHEN METABOLIC PANEL: CPT | Performed by: INTERNAL MEDICINE

## 2020-09-23 PROCEDURE — 94799 UNLISTED PULMONARY SVC/PX: CPT

## 2020-09-23 PROCEDURE — 82550 ASSAY OF CK (CPK): CPT | Performed by: INTERNAL MEDICINE

## 2020-09-23 PROCEDURE — 25010000002 CEFEPIME PER 500 MG: Performed by: INTERNAL MEDICINE

## 2020-09-23 PROCEDURE — 82728 ASSAY OF FERRITIN: CPT | Performed by: INTERNAL MEDICINE

## 2020-09-23 PROCEDURE — 85025 COMPLETE CBC W/AUTO DIFF WBC: CPT | Performed by: INTERNAL MEDICINE

## 2020-09-23 PROCEDURE — 82962 GLUCOSE BLOOD TEST: CPT

## 2020-09-23 PROCEDURE — 99232 SBSQ HOSP IP/OBS MODERATE 35: CPT | Performed by: INTERNAL MEDICINE

## 2020-09-23 PROCEDURE — 86140 C-REACTIVE PROTEIN: CPT | Performed by: INTERNAL MEDICINE

## 2020-09-23 RX ORDER — DEXAMETHASONE SODIUM PHOSPHATE 10 MG/ML
6 INJECTION, SOLUTION INTRAMUSCULAR; INTRAVENOUS DAILY
Status: DISCONTINUED | OUTPATIENT
Start: 2020-09-24 | End: 2020-09-24

## 2020-09-23 RX ORDER — POLYETHYLENE GLYCOL 3350 17 G/17G
17 POWDER, FOR SOLUTION ORAL DAILY
Status: DISCONTINUED | OUTPATIENT
Start: 2020-09-23 | End: 2020-09-24 | Stop reason: HOSPADM

## 2020-09-23 RX ADMIN — Medication 10 ML: at 08:55

## 2020-09-23 RX ADMIN — BUDESONIDE AND FORMOTEROL FUMARATE DIHYDRATE 2 PUFF: 160; 4.5 AEROSOL RESPIRATORY (INHALATION) at 06:40

## 2020-09-23 RX ADMIN — CEFEPIME 2 G: 2 INJECTION, POWDER, FOR SOLUTION INTRAVENOUS at 01:38

## 2020-09-23 RX ADMIN — PANTOPRAZOLE SODIUM 40 MG: 40 TABLET, DELAYED RELEASE ORAL at 05:36

## 2020-09-23 RX ADMIN — AZITHROMYCIN MONOHYDRATE 250 MG: 250 TABLET ORAL at 08:55

## 2020-09-23 RX ADMIN — DEXAMETHASONE SODIUM PHOSPHATE 6 MG: 10 INJECTION, SOLUTION INTRAMUSCULAR; INTRAVENOUS at 08:55

## 2020-09-23 RX ADMIN — CETIRIZINE HYDROCHLORIDE 10 MG: 10 TABLET, FILM COATED ORAL at 08:54

## 2020-09-23 RX ADMIN — POLYETHYLENE GLYCOL 3350 17 G: 17 POWDER, FOR SOLUTION ORAL at 15:27

## 2020-09-23 RX ADMIN — Medication 10 ML: at 20:09

## 2020-09-23 RX ADMIN — ENOXAPARIN SODIUM 40 MG: 40 INJECTION SUBCUTANEOUS at 17:30

## 2020-09-23 RX ADMIN — CEFEPIME 2 G: 2 INJECTION, POWDER, FOR SOLUTION INTRAVENOUS at 17:30

## 2020-09-23 RX ADMIN — SODIUM CHLORIDE 100 MG: 9 INJECTION, SOLUTION INTRAVENOUS at 15:27

## 2020-09-23 RX ADMIN — CEFEPIME 2 G: 2 INJECTION, POWDER, FOR SOLUTION INTRAVENOUS at 08:55

## 2020-09-23 RX ADMIN — BUDESONIDE AND FORMOTEROL FUMARATE DIHYDRATE 2 PUFF: 160; 4.5 AEROSOL RESPIRATORY (INHALATION) at 19:37

## 2020-09-23 NOTE — PAYOR COMM NOTE
"CLINICAL UPDATE FOR CONTINUED STAY:  Monisha Wilder (65 y.o. Female)   1955  Auth # [id number]          AUTHORIZATION PENDING:   PLEASE CALL OR FAX DETERMINATION TO CONTACT BELOW. THANK YOU.        Sharon Lackey, RN MSN  /UR  [facility]  [phone] office  [phone] fax  [email or web address]    [facility]  NPI: [id number]  Tax: [id number]      Monisha Wilder (65 y.o. Female)     Date of Birth Social Security Number Address Home Phone MRN    1955  [id number]  [place] [address] [phone] [id number]    Bahai Marital Status          Non-Scientologist        Admission Date Admission Type Admitting Provider Attending Provider Department, Room/Bed    9/19/20 Emergency Lenin Brooks MD Gad, George Fayez Labib Youssief, MD [facility] 2A PEDIATRICS, 201/1    Discharge Date Discharge Disposition Discharge Destination                       Attending Provider: Lenin Brooks MD    Allergies: Sulfa Antibiotics    Isolation: Enh Drop/Con   Infection: COVID (confirmed) (09/12/20)   Code Status: CPR    Ht: 162.6 cm (64\")   Wt: 101 kg (222 lb)    Admission Cmt: None   Principal Problem: None                Active Insurance as of 9/19/2020     Primary Coverage     Payor Plan Insurance Group Employer/Plan Group    [facility] PP [id number]     Payor Plan Address Payor Plan Phone Number Payor Plan Fax Number Effective Dates    [address] [phone]  1/1/2020 - None Entered    [place] [id number]       Subscriber Name Subscriber Birth Date Member ID       CHENTE TREVIÑO 1955 [id number]           Secondary Coverage     Payor Plan Insurance Group Employer/Plan Group    MEDICARE MEDICARE A & B      Payor Plan Address Payor Plan Phone Number Payor Plan Fax Number Effective Dates    [address] [phone]  1/1/2013 - None Entered    [facility]" [id number]       Subscriber Name Subscriber Birth Date Member ID       ANTONIA MONROY 1955 [id number]                 Emergency Contacts      (Rel.) Home Phone Work Phone Mobile Phone    CHENTE TREVIÑO (Spouse) -- -- [phone]               Physician Progress Notes (last 24 hours) (Notes from 09/22/20 0735 through 09/23/20 0735)      Dario Dawson MD at 09/22/20 1750        Hypoxia improving. Continue current treatment including Dexa, Remdesivir, convalescent plasma.     Electronically signed by Dario Dawson MD at 09/22/20 1751     Lenin Brooks MD at 09/22/20 1321                [facility] Medicine Services Daily Progress Note      Hospitalist Team  LOS 3 days      Patient Care Team:  Polly Domingo MD as PCP - General (Family Medicine)  Bess Serrato MD as PCP - Claims Attributed  Bess Serrato MD as Consulting Physician (Pulmonary Disease)    Patient Location: 201/1      Subjective   Subjective     Chief Complaint / Subjective  Chief Complaint   Patient presents with   • Fever         Brief Synopsis of Hospital Course/HPI  Patient is a 65-year-old female with history of COPD on home oxygen 2.5 L at bedtime.  Presented to the emergency room on 9/20/2020 with complaints of cough productive of brownish sputum, progressively worsening shortness of breath and body aches.  Patient stated symptoms started about 10 days ago  and she had gone to an outlying urgent care facility where she was diagnosed with COVID-19 on 9/11/2020.  Since then her symptoms continue to worsen and she now has to use her home oxygen continuously.  Related having subjective fever and chills  Of note is that her  is also positive for COVID-19.  In the ED, temperature was 101.6 and patient 's oxygen saturation was 86% on room air improved to 93% with 5 L of oxygen via nasal cannula.  Chest x-ray done showed worsening multifocal pneumonia.     Patient  "was admitted for further care.      Date::    9/20/2020  Patient seen and examined  Shortness of breath continues  Currently on nonrebreather mask 11 L and saturating mid 90s    9/21/2020  Patient reports that she feels better but her oxygen requirements are increasing up to 15 L.  Repeat chest x-ray ordered and pulmonologist consulted.  May benefit from convalescent plasma/Actemra.    SHIVANIll other systems reviewed and negative      Objective   Objective      Vital Signs  Temp:  [94.9 °F (34.9 °C)-97.3 °F (36.3 °C)] 97 °F (36.1 °C)  Heart Rate:  [63-81] 72  Resp:  [18-24] 18  BP: (118-133)/(63-74) 131/74  Oxygen Therapy  SpO2: 96 %  Pulse Oximetry Type: Continuous  Device (Oxygen Therapy): nonrebreather mask  Flow (L/min): 8  Oxygen Concentration (%): 100  Flowsheet Rows      First Filed Value   Admission Height  162.6 cm (64\") Documented at 09/19/2020 0912   Admission Weight  101 kg (221 lb 9 oz) Documented at 09/19/2020 0912        Intake & Output (last 3 days)       09/19 0701 - 09/20 0700 09/20 0701 - 09/21 0700 09/21 0701 - 09/22 0700 09/22 0701 - 09/23 0700    P.O.  600    Total Intake(mL/kg) 120 (1.2) 600 (5.9) 1080 (10.1) 600 (5.6)    Urine (mL/kg/hr)  200 (0.1) 400 (0.2) 350 (0.5)    Total Output  200 400 350    Net +120 +400 +680 +250            Urine Unmeasured Occurrence  2 x 1 x     Stool Unmeasured Occurrence 1 x           Lines, Drains & Airways    Active LDAs     Name:   Placement date:   Placement time:   Site:   Days:    Peripheral IV 09/19/20 0939 Right Antecubital   09/19/20 0939    Antecubital   1                  Physical Exam:    Physical Exam  Vitals signs reviewed.   Constitutional:       Comments: Moderate respiratory distress   HENT:      Head: Normocephalic and atraumatic.      Nose: Nose normal.   Eyes:      Extraocular Movements: Extraocular movements intact.      Conjunctiva/sclera: Conjunctivae normal.      Pupils: Pupils are equal, round, and reactive to light.   Neck: "      Musculoskeletal: Neck supple.   Cardiovascular:      Rate and Rhythm: Normal rate and regular rhythm.   Pulmonary:      Effort: Pulmonary effort is normal.      Comments: Decreased air entry at the bases  Abdominal:      General: Bowel sounds are normal.      Palpations: Abdomen is soft.      Tenderness: There is no abdominal tenderness.   Musculoskeletal: Normal range of motion.   Skin:     General: Skin is warm and dry.   Neurological:      Mental Status: She is alert and oriented to person, place, and time.   Psychiatric:         Mood and Affect: Mood normal.               Procedures:              Results Review:     I reviewed the patient's new clinical results.      Lab Results (last 24 hours)     Procedure Component Value Units Date/Time    POC Glucose Once [[id number]]  (Abnormal) Collected: 09/22/20 1113    Specimen: Blood Updated: 09/22/20 1116     Glucose 138 mg/dL      Comment: Serial Number: [id number]:  [id number]       BUN [[id number]]  (Normal) Collected: 09/22/20 0530    Specimen: Blood Updated: 09/22/20 1101     BUN 20 mg/dL     Blood Culture - Blood, Arm, Left [[id number]] Collected: 09/19/20 0937    Specimen: Blood from Arm, Left Updated: 09/22/20 0946     Blood Culture No growth at 3 days    Blood Culture - Blood, Arm, Right [[id number]] Collected: 09/19/20 0937    Specimen: Blood from Arm, Right Updated: 09/22/20 0946     Blood Culture No growth at 3 days    POC Glucose Once [[id number]]  (Abnormal) Collected: 09/22/20 0704    Specimen: Blood Updated: 09/22/20 0705     Glucose 128 mg/dL      Comment: Serial Number: [id number]:  [id number]       D-dimer, Quantitative [[id number]]  (Abnormal) Collected: 09/22/20 0530    Specimen: Blood Updated: 09/22/20 0638     D-Dimer, Quantitative 0.61 mg/L (FEU)     Narrative:      Reference Range  --------------------------------------------------------------------     < 0.50   Negative Predictive Value  0.50-0.59   Indeterminate    >= 0.60    Probable VTE             A very low percentage of patients with DVT may yield D-Dimer results   below the cut-off of 0.50 mg/L FEU.  This is known to be more   prevalent in patients with distal DVT.             Results of this test should always be interpreted in conjunction with   the patient's medical history, clinical presentation and other   findings.  Clinical diagnosis should not be based on the result of   INNOVANCE D-Dimer alone.    Fibrinogen [[id number]]  (Normal) Collected: 09/22/20 0530    Specimen: Blood Updated: 09/22/20 0638     Fibrinogen 407 mg/dL     Ferritin [[id number]]  (Abnormal) Collected: 09/22/20 0530    Specimen: Blood Updated: 09/22/20 0637     Ferritin 999.00 ng/mL     Narrative:      Results may be falsely decreased if patient taking Biotin.      Comprehensive Metabolic Panel [[id number]]  (Abnormal) Collected: 09/22/20 0530    Specimen: Blood Updated: 09/22/20 0636     Glucose 145 mg/dL      BUN --     Comment: Testing performed by alternate method        Creatinine 0.63 mg/dL      Sodium 142 mmol/L      Potassium 4.4 mmol/L      Chloride 105 mmol/L      CO2 30.0 mmol/L      Calcium 8.1 mg/dL      Total Protein 5.5 g/dL      Albumin 3.10 g/dL      ALT (SGPT) 48 U/L      AST (SGOT) 47 U/L      Alkaline Phosphatase 74 U/L      Total Bilirubin 0.3 mg/dL      eGFR Non African Amer 95 mL/min/1.73      Globulin 2.4 gm/dL      A/G Ratio 1.3 g/dL      BUN/Creatinine Ratio --     Comment: Testing not performed        Anion Gap 7.0 mmol/L     Narrative:      GFR Normal >60  Chronic Kidney Disease <60  Kidney Failure <15      CK [[id number]]  (Normal) Collected: 09/22/20 0530    Specimen: Blood Updated: 09/22/20 0636     Creatine Kinase 97 U/L     C-reactive Protein [[id number]]  (Abnormal) Collected: 09/22/20 0530    Specimen: Blood Updated: 09/22/20 0636     C-Reactive Protein 2.05 mg/dL     Lactate Dehydrogenase [[id number]]  (Abnormal) Collected: 09/22/20 0530    Specimen: Blood Updated: 09/22/20  0636      U/L     CBC & Differential [[id number]]  (Abnormal) Collected: 09/22/20 0530    Specimen: Blood Updated: 09/22/20 0616    Narrative:      The following orders were created for panel order CBC & Differential.  Procedure                               Abnormality         Status                     ---------                               -----------         ------                     CBC Auto Differential[[id number]]        Abnormal            Final result                 Please view results for these tests on the individual orders.    CBC Auto Differential [[id number]]  (Abnormal) Collected: 09/22/20 0530    Specimen: Blood Updated: 09/22/20 0616     WBC 6.50 10*3/mm3      RBC 4.34 10*6/mm3      Hemoglobin 13.6 g/dL      Hematocrit 41.0 %      MCV 94.3 fL      MCH 31.4 pg      MCHC 33.3 g/dL      RDW 14.1 %      RDW-SD 46.8 fl      MPV 7.8 fL      Platelets 292 10*3/mm3      Neutrophil % 79.8 %      Lymphocyte % 9.7 %      Monocyte % 10.3 %      Eosinophil % 0.0 %      Basophil % 0.2 %      Neutrophils, Absolute 5.20 10*3/mm3      Lymphocytes, Absolute 0.60 10*3/mm3      Monocytes, Absolute 0.70 10*3/mm3      Eosinophils, Absolute 0.00 10*3/mm3      Basophils, Absolute 0.00 10*3/mm3      nRBC 0.1 /100 WBC     POC Glucose Once [[id number]]  (Abnormal) Collected: 09/21/20 2037    Specimen: Blood Updated: 09/21/20 2038     Glucose 153 mg/dL      Comment: Serial Number: [id number]:  [id number]       BNP [[id number]]  (Normal) Collected: 09/21/20 0819    Specimen: Blood Updated: 09/21/20 1916     proBNP 516.2 pg/mL     Narrative:      Among patients with dyspnea, NT-proBNP is highly sensitive for the detection of acute congestive heart failure. In addition NT-proBNP of <300 pg/ml effectively rules out acute congestive heart failure with 99% negative predictive value.    Results may be falsely decreased if patient taking Biotin.      POC Glucose Once [[id number]]  (Abnormal) Collected: 09/21/20 1708     Specimen: Blood Updated: 09/21/20 1709     Glucose 178 mg/dL      Comment: Serial Number: [id number]:  [id number]       BUN [[id number]]  (Normal) Collected: 09/21/20 0819    Specimen: Blood Updated: 09/21/20 1609     BUN 16 mg/dL         No results found for: HGBA1C        Results from last 7 days   Lab Units 09/19/20  0959   PH, ARTERIAL pH units 7.471*   PO2 ART mm Hg 76.9*   PCO2, ARTERIAL mm Hg 31.8*   HCO3 ART mmol/L 23.2     No results found for: LIPASE  No results found for: CHOL, CHLPL, TRIG, HDL, LDL, LDLDIRECT    No results found for: INTRAOP, PREDX, FINALDX, COMDX    Microbiology Results (last 10 days)     Procedure Component Value - Date/Time    Respiratory Panel, PCR - Swab, Nasopharynx [[id number]]  (Normal) Collected: 09/19/20 1504    Lab Status: Final result Specimen: Swab from Nasopharynx Updated: 09/19/20 1651     ADENOVIRUS, PCR Not Detected     Coronavirus 229E Not Detected     Coronavirus HKU1 Not Detected     Coronavirus NL63 Not Detected     Coronavirus OC43 Not Detected     Human Metapneumovirus Not Detected     Human Rhinovirus/Enterovirus Not Detected     Influenza B PCR Not Detected     Parainfluenza Virus 1 Not Detected     Parainfluenza Virus 2 Not Detected     Parainfluenza Virus 3 Not Detected     Parainfluenza Virus 4 Not Detected     Bordetella pertussis pcr Not Detected     Influenza A H1 2009 PCR Not Detected     Chlamydophila pneumoniae PCR Not Detected     Mycoplasma pneumo by PCR Not Detected     Influenza A PCR Not Detected     Influenza A H3 Not Detected     Influenza A H1 Not Detected     RSV, PCR Not Detected     Bordetella parapertussis PCR Not Detected    Narrative:      The coronavirus on the RVP is NOT COVID-19 and is NOT indicative of infection with COVID-19.     Blood Culture - Blood, Arm, Left [[id number]] Collected: 09/19/20 0965    Lab Status: Preliminary result Specimen: Blood from Arm, Left Updated: 09/22/20 0946     Blood Culture No growth at 3 days    Blood  Culture - Blood, Arm, Right [[id number]] Collected: 09/19/20 0937    Lab Status: Preliminary result Specimen: Blood from Arm, Right Updated: 09/22/20 0946     Blood Culture No growth at 3 days          ECG/EMG Results (most recent)     Procedure Component Value Units Date/Time    ECG 12 Lead [[id number]] Collected: 09/19/20 0944     Updated: 09/20/20 0831    Narrative:      HEART RATE= 110  bpm  RR Interval= 544  ms  NJ Interval= 158  ms  P Horizontal Axis= 0  deg  P Front Axis= 61  deg  QRSD Interval= 84  ms  QT Interval= 319  ms  QRS Axis= 5  deg  T Wave Axis= 62  deg  - OTHERWISE NORMAL ECG -  Sinus tachycardia  No previous ECG available for comparison  Electronically Signed By: Reid Pollock ([facility]) 20-Sep-2020 08:30:38  Date and Time of Study: 2020-09-19 09:44:09                    Xr Chest 1 View    Result Date: 9/21/2020  1. Slight improvement of the bilateral airspace opacities likely representing pneumonia.  Electronically Signed By-Flaquito Lewis On:9/21/2020 11:11 AM This report was finalized on [id number] by  Flaquito Lewis, .    Xr Chest Ap    Result Date: 9/19/2020  Multifocal consolidation involving the right midlung and lung bases bilaterally consistent with multifocal pneumonia, worsened from 09/10/2020.  Electronically Signed By-Ramiro Villa On:9/19/2020 10:30 AM This report was finalized on [id number] by  Ramiro Villa .          Xrays, labs reviewed personally by physician.    Medication Review:   I have reviewed the patient's current medication list      Scheduled Meds  azithromycin, 250 mg, Oral, Q24H  budesonide-formoterol, 2 puff, Inhalation, BID - RT  cefepime, 2 g, Intravenous, Q8H  cetirizine, 10 mg, Oral, Daily  dexamethasone, 6 mg, Intravenous, BID  enoxaparin, 40 mg, Subcutaneous, Q24H  INV [id number] remdesivir in NS IVPB, 100 mg, Intravenous, Q24H  pantoprazole, 40 mg, Oral, Q AM  sodium chloride, 10 mL, Intravenous, Q12H        Meds Infusions  Pharmacy Consult - Remdesivir,      Pharmacy Consult - Steroid Insulin Protocol,   Pharmacy to Dose Cefepime,         Meds PRN  •  acetaminophen **OR** acetaminophen **OR** acetaminophen  •  albuterol sulfate HFA  •  aluminum-magnesium hydroxide-simethicone  •  melatonin  •  nitroglycerin  •  ondansetron **OR** ondansetron  •  Pharmacy Consult - Remdesivir  •  Pharmacy Consult - Steroid Insulin Protocol  •  Pharmacy to Dose Cefepime  •  [COMPLETED] Insert peripheral IV **AND** sodium chloride  •  sodium chloride        Assessment/Plan   Assessment/Plan     Active Hospital Problems    Diagnosis  POA   • COVID-19 virus infection [U07.1]  Yes      Resolved Hospital Problems   No resolved problems to display.       MEDICAL DECISION MAKING COMPLEXITY BY PROBLEM:     Multifocal pneumonia due to COVID-19 infection  Tested positive for COVID-19 on 09/10/2020   respiratory viral panel insignificant  Chest x-ray reviewed-showed worsening of the focal pneumonia  on Decadron and remdesivir   Also started on azithromycin  Respiratory care with bronchodilator  Oxygen therapy and titration  Airborne and contact precaution initiated  Monitor inflammatory markers  Consult pulmonary repeat chest x-ray  Patient is saturating 93% on 10 L nonrebreather.  She has been agreeable on convalescent plasma transfusion.  She has not received that yet.  She feels is relatively stable.  Her LDH is normalizing now.  Her d-dimer slightly high 0.61 and ferritin is better than yesterday 999  Normal liver enzymes slightly elevated \  dexamethasone was increased as well by and started on cefepime.  Pulmonologist      Fever-most likely due to above  Resolved  Acute hypoxic respiratory failure on chronic respiratory failure  Currently on 11 L of oxygen via nonrebreather mask  Oxygen therapy and titration  Blood cultures negative so far     DVT prophylaxis with Lovenox     Obesity BMI 30.0    Mechanical Order History:     None      Pharmalogical Order History:      Ordered     Dose Route  Frequency Stop    09/19/20 1358  enoxaparin (LOVENOX) syringe 40 mg      40 mg SC Every 24 Hours --                  Code Status -   Code Status and Medical Interventions:   Ordered at: 09/19/20 1358     Level Of Support Discussed With:    Patient     Code Status:    CPR     Medical Interventions (Level of Support Prior to Arrest):    Full       This patient has been examined with appropriate PPE. 09/22/20        Discharge Planning  Pending clinical progress          Electronically signed by Lenin Brooks MD, 09/22/20, 13:21 EDT.  [facility]        Electronically signed by Lenin Brooks MD at 09/22/20 7523

## 2020-09-23 NOTE — PROGRESS NOTES
[facility]/PULM/CC PROGRESS NOTE       SUBJECTIVE    65-year-old female with history of COPD on home oxygen 2.5 L at bedtime.  Admitted on 9/20/2020 with complaints of cough productive of brownish sputum, progressively worsening shortness of breath, fever and body aches.  Patient stated symptoms started about 10 days ago  and she had gone to an outlying urgent care facility where she was diagnosed with COVID-19 on 9/11/2020.  Since then her symptoms continue to worsen and she now has to use her home oxygen continuously. her  is also positive for COVID-19.  In the ED, temperature was 101.6 and patient 's oxygen saturation was 86% on room air   Chest x-ray done showed worsening multifocal pneumonia.     9/23 feels better today        REVIEW OF SYSTEMS    Constitutional:  no fever   Eyes:  Denies change in visual acuity   HENT:  Denies nasal congestion or sore throat   Respiratory:  + cough, no shortness of breath   Cardiovascular:  Denies chest pain or edema   GI:  Denies abdominal pain, nausea, vomiting, bloody stools or diarrhea   :  Denies dysuria   Musculoskeletal:  Denies back pain or joint pain   Integument:  Denies rash   Neurologic:  Denies headache, focal weakness or sensory changes   Endocrine:  Denies polyuria or polydipsia   Lymphatic:  Denies swollen glands   Psychiatric:  Denies depression or anxiety   OBJECTIVE    Vitals:    09/23/20 0640 09/23/20 0818 09/23/20 1404 09/23/20 1658   BP:  137/74 145/77 137/74   BP Location:  Left arm Left arm Left arm   Patient Position:  Lying Lying Lying   Pulse: 60 66 71 68   Resp: 23 25 16 24   Temp:  97.1 °F (36.2 °C) 98 °F (36.7 °C) 97.7 °F (36.5 °C)   TempSrc:  Oral Infrared Infrared   SpO2: 92% 94% 93% 94%   Weight:       Height:          Intake/Output last 3 shifts:  I/O last 3 completed shifts:  In: 1870 [P.O.:1120; Blood:750]  Out: 1650 [Urine:1650]  Intake/Output this shift:  I/O this shift:  In: 720 [P.O.:720]  Out: -     Constitutional:  Obese, no acute  distress  Eyes:  PERRL, conjunctiva normal   HENT:  Atraumatic, external ears normal, nose normal, oropharynx moist, no pharyngeal exudates.   Neck- normal range of motion, no tenderness, supple   Respiratory:  No respiratory distress, few rhonchi at bases   Cardiovascular:  Normal rate, normal rhythm, no edema  GI:  Soft, nondistended, normal bowel sounds, nontender, no organomegaly, no mass, no rebound, no guarding   :  No costovertebral angle tenderness   Musculoskeletal:  No edema, no tenderness, no deformities. Back- no tenderness  Integument:  Well hydrated, no rash   Lymphatic:  No lymphadenopathy noted   Neurologic:  Alert & oriented x 3, CN 2-12 normal, normal motor function, normal sensory function, no focal deficits noted   Psychiatric:  Speech and behavior appropriate     Scheduled Meds:budesonide-formoterol, 2 puff, Inhalation, BID - RT  cefepime, 2 g, Intravenous, Q8H  cetirizine, 10 mg, Oral, Daily  dexamethasone, 6 mg, Intravenous, BID  enoxaparin, 40 mg, Subcutaneous, Q24H  pantoprazole, 40 mg, Oral, Q AM  polyethylene glycol, 17 g, Oral, Daily  sodium chloride, 10 mL, Intravenous, Q12H        Continuous Infusions:Pharmacy Consult - Remdesivir,   Pharmacy Consult - Steroid Insulin Protocol,   Pharmacy to Dose Cefepime,         PRN Meds:•  acetaminophen **OR** acetaminophen **OR** acetaminophen  •  albuterol sulfate HFA  •  aluminum-magnesium hydroxide-simethicone  •  melatonin  •  nitroglycerin  •  ondansetron **OR** ondansetron  •  Pharmacy Consult - Remdesivir  •  Pharmacy Consult - Steroid Insulin Protocol  •  Pharmacy to Dose Cefepime  •  [COMPLETED] Insert peripheral IV **AND** sodium chloride  •  sodium chloride     LABS    CBC  Results from last 7 days   Lab Units 09/23/20  0425 09/22/20  0530 09/21/20  0819 09/20/20  0322 09/19/20  0937   WBC 10*3/mm3 7.90 6.50 6.00 3.50 6.20   RBC 10*6/mm3 4.23 4.34 4.39 4.43 4.65   HEMOGLOBIN g/dL 13.4 13.6 13.8 14.0 14.7   HEMATOCRIT % 39.8 41.0 41.9  41.2 43.2   MCV fL 94.1 94.3 95.3 93.0 92.9   PLATELETS 10*3/mm3 334 292 277 207 204       CMP   Results from last 7 days   Lab Units 09/23/20  0425 09/22/20  0530 09/21/20  0819 09/21/20  0349 09/20/20  0322 09/19/20  1009   SODIUM mmol/L 142 142 140 140 138 133*   POTASSIUM mmol/L 4.3 4.4 4.0 4.2 3.9 3.6   CHLORIDE mmol/L 106 105 104 104 102 97*   CO2 mmol/L 26.0 30.0* 28.0 26.0 26.0 25.0   BUN  20 20 16 16 10 7*   CREATININE mg/dL 0.69 0.63 0.63 0.59 0.53* 0.57   GLUCOSE mg/dL 152* 145* 132* 141* 129* 118*   ALBUMIN g/dL 3.10* 3.10*  --  2.90* 3.10* 3.30*   BILIRUBIN mg/dL 0.4 0.3  --  0.3 0.2 0.3   ALK PHOS U/L 81 74  --  64 64 61   AST (SGOT) U/L 52* 47*  --  52* 63* 67*   ALT (SGPT) U/L 73* 48*  --  42* 45* 45*       TROPONIN  Results from last 7 days   Lab Units 09/23/20  0425 09/19/20  1009   CK TOTAL U/L 82   < >  --    TROPONIN T ng/mL  --   --  <0.010    < > = values in this interval not displayed.       CoAg        ABG  Results from last 7 days   Lab Units 09/19/20  0959   PH, ARTERIAL pH units 7.471*   PCO2, ARTERIAL mm Hg 31.8*   PO2 ART mm Hg 76.9*   O2 SATURATION ART % 96.2   BASE EXCESS ART mmol/L 0.4       Microbiology  Microbiology Results (last 10 days)     Procedure Component Value - Date/Time    Respiratory Culture - Sputum, Cough [[id number]] Collected: 09/22/20 2312    Lab Status: Preliminary result Specimen: Sputum from Cough Updated: 09/23/20 0810     Gram Stain Few (2+) WBCs per low power field      Rare (1+) Epithelial cells per low power field      Moderate (3+) Gram negative bacilli      Rare (1+) Gram positive cocci    Respiratory Panel, PCR - Swab, Nasopharynx [[id number]]  (Normal) Collected: 09/19/20 1504    Lab Status: Final result Specimen: Swab from Nasopharynx Updated: 09/19/20 1651     ADENOVIRUS, PCR Not Detected     Coronavirus 229E Not Detected     Coronavirus HKU1 Not Detected     Coronavirus NL63 Not Detected     Coronavirus OC43 Not Detected     Human Metapneumovirus Not  Detected     Human Rhinovirus/Enterovirus Not Detected     Influenza B PCR Not Detected     Parainfluenza Virus 1 Not Detected     Parainfluenza Virus 2 Not Detected     Parainfluenza Virus 3 Not Detected     Parainfluenza Virus 4 Not Detected     Bordetella pertussis pcr Not Detected     Influenza A H1 2009 PCR Not Detected     Chlamydophila pneumoniae PCR Not Detected     Mycoplasma pneumo by PCR Not Detected     Influenza A PCR Not Detected     Influenza A H3 Not Detected     Influenza A H1 Not Detected     RSV, PCR Not Detected     Bordetella parapertussis PCR Not Detected    Narrative:      The coronavirus on the RVP is NOT COVID-19 and is NOT indicative of infection with COVID-19.     Blood Culture - Blood, Arm, Left [[id number]] Collected: 09/19/20 0937    Lab Status: Preliminary result Specimen: Blood from Arm, Left Updated: 09/23/20 1000     Blood Culture No growth at 4 days    Blood Culture - Blood, Arm, Right [[id number]] Collected: 09/19/20 0937    Lab Status: Preliminary result Specimen: Blood from Arm, Right Updated: 09/23/20 0945     Blood Culture No growth at 4 days          IMAGING & OTHER STUDIES    Imaging Results (Last 72 Hours)     Procedure Component Value Units Date/Time    XR Chest 1 View [[id number]] Collected: 09/21/20 1110     Updated: 09/21/20 1113    Narrative:      EXAMINATION: XR CHEST 1 VW-     DATE OF EXAM: 9/21/2020 11:02 AM     INDICATION: Per MD; U07.1-COVID-19; R09.02-Hypoxemia; R50.9-Fever,  unspecified; J18.9-Pneumonia, unspecified organism.     COMPARISON: Chest radiograph dated 9/19/2020     TECHNIQUE: Portable AP view of the chest was obtained.     FINDINGS:  The cardiomediastinal silhouette is within normal limits. There is  aortic arch atherosclerotic calcification. There are bilateral airspace  opacities within the inferior right upper lobe and bilateral lung bases.  These appear slightly improved from the prior examination. There is no  significant pleural effusion or  pneumothorax. There are degenerative  changes of the thoracic spine.       Impression:      1. Slight improvement of the bilateral airspace opacities likely  representing pneumonia.     Electronically Signed By-Flaquito Lewis On:9/21/2020 11:11 AM  This report was finalized on [id number] by  Flaquito Lewis, .                ASSESSMENT/PLAN:   Severe COVID-19 virus infection  - Taper Dexamethasone. Improving hypoxia.   - Continue Remdesivir.  - Received Convalescent plasma.  - Continue Lovenox.      Multifocal PNA- HCAP  - Elevated procalcitonin.   - Cont Cefepime empirically.  - Monitor sputum cx      Acute hypoxic respiratpory failure  - Continue O2 supplementation to keep O2 sat >92%  - on 2LNC      DVT ppy Lovenox     The FDA has authorized the emergency use of COVID-19 convalescent plasma, which is not an FDA approved biological product. Discussions with the patient/patient caregiver regarding the risks and benefits of COVID-19 convalescent plasma have occurred. The patient/patient caregiver verbalizes recognition that this is an investigational treatment which may offer significant known and potential benefits and risks, the extent of which are unknown. Information on available alternative treatments and the risks and benefits of those alternatives was discussed. “Fact Sheet for Patients and Parents/Caregivers” was discussed with the patient/patient caregiver. All questions were answered to satisfaction and the patient/patient caregiver has the option to accept or refuse administration of COVID-19 convalescent plasma and would like to accept treatment.       THIS DOCUMENT HAS BEEN ELECTRONICALLY SIGNED BY  Dario Dawson MD  17:09 EDT

## 2020-09-23 NOTE — PROGRESS NOTES
[facility] Daily Progress Note      Hospitalist Team  LOS 4 days      Patient Care Team:  Polly Domingo MD as PCP - General (Family Medicine)  Bess Serrato MD as PCP - Claims Attributed  Bess Serrato MD as Consulting Physician (Pulmonary Disease)    Patient Location: 201/1      Subjective   Subjective     Chief Complaint / Subjective  Chief Complaint   Patient presents with   • Fever         Brief Synopsis of Hospital Course/HPI  Patient is a 65-year-old female with history of COPD on home oxygen 2.5 L at bedtime.  Presented to the emergency room on 9/20/2020 with complaints of cough productive of brownish sputum, progressively worsening shortness of breath and body aches.  Patient stated symptoms started about 10 days ago  and she had gone to an outlying urgent care facility where she was diagnosed with COVID-19 on 9/11/2020.  Since then her symptoms continue to worsen and she now has to use her home oxygen continuously.  Related having subjective fever and chills  Of note is that her  is also positive for COVID-19.  In the ED, temperature was 101.6 and patient 's oxygen saturation was 86% on room air improved to 93% with 5 L of oxygen via nasal cannula.  Chest x-ray done showed worsening multifocal pneumonia.     Patient was admitted for further care.      Date::    9/20/2020  Patient seen and examined  Shortness of breath continues  Currently on nonrebreather mask 11 L and saturating mid 90s    9/21/2020  Patient reports that she feels better but her oxygen requirements are increasing up to 15 L.  Repeat chest x-ray ordered and pulmonologist consulted.  May benefit from convalescent plasma/Actemra.    9/22/2020  Patient on high flow oxygen with nonrebreather  She had agreed on convalescent plasma currently pending    9/23/2020  Patient is 50 units of convalescent plasma and her oxygenation is much better now.  She is asking for  "laxative.        ROSAll other systems reviewed and negative      Objective   Objective      Vital Signs  Temp:  [96.5 °F (35.8 °C)-97.3 °F (36.3 °C)] 97.1 °F (36.2 °C)  Heart Rate:  [57-77] 66  Resp:  [18-25] 25  BP: (122-137)/(62-74) 137/74  Oxygen Therapy  SpO2: 94 %  Pulse Oximetry Type: Continuous  Device (Oxygen Therapy): nasal cannula  Flow (L/min): 2  Oxygen Concentration (%): 100  Flowsheet Rows      First Filed Value   Admission Height  162.6 cm (64\") Documented at 09/19/2020 0912   Admission Weight  101 kg (221 lb 9 oz) Documented at 09/19/2020 0912        Intake & Output (last 3 days)       09/20 0701 - 09/21 0700 09/21 0701 - 09/22 0700 09/22 0701 - 09/23 0700 09/23 0701 - 09/24 0700    P.O. 600 1080 1120 360    Blood   750     Total Intake(mL/kg) 600 (5.9) 1080 (10.1) 1870 (18.5) 360 (3.6)    Urine (mL/kg/hr) 200 (0.1) 400 (0.2) 1350 (0.6)     Total Output      Net +400 +680 +520 +360            Urine Unmeasured Occurrence 2 x 1 x          Lines, Drains & Airways    Active LDAs     Name:   Placement date:   Placement time:   Site:   Days:    Peripheral IV 09/19/20 0939 Right Antecubital   09/19/20 0939    Antecubital   1                  Physical Exam:    Physical Exam  Vitals signs reviewed.   Constitutional:       Comments: Moderate respiratory distress   HENT:      Head: Normocephalic and atraumatic.      Nose: Nose normal.   Eyes:      Extraocular Movements: Extraocular movements intact.      Conjunctiva/sclera: Conjunctivae normal.      Pupils: Pupils are equal, round, and reactive to light.   Neck:      Musculoskeletal: Neck supple.   Cardiovascular:      Rate and Rhythm: Normal rate and regular rhythm.   Pulmonary:      Effort: Pulmonary effort is normal.      Comments: Decreased air entry at the bases  Abdominal:      General: Bowel sounds are normal.      Palpations: Abdomen is soft.      Tenderness: There is no abdominal tenderness.   Musculoskeletal: Normal range of motion. "   Skin:     General: Skin is warm and dry.   Neurological:      Mental Status: She is alert and oriented to person, place, and time.   Psychiatric:         Mood and Affect: Mood normal.               Procedures:              Results Review:     I reviewed the patient's new clinical results.      Lab Results (last 24 hours)     Procedure Component Value Units Date/Time    BUN [[id number]]  (Normal) Collected: 09/23/20 0425    Specimen: Blood Updated: 09/23/20 1120     BUN 20 mg/dL     Blood Culture - Blood, Arm, Left [[id number]] Collected: 09/19/20 0937    Specimen: Blood from Arm, Left Updated: 09/23/20 1000     Blood Culture No growth at 4 days    Blood Culture - Blood, Arm, Right [[id number]] Collected: 09/19/20 0937    Specimen: Blood from Arm, Right Updated: 09/23/20 0945     Blood Culture No growth at 4 days    POC Glucose Once [[id number]]  (Abnormal) Collected: 09/23/20 0817    Specimen: Blood Updated: 09/23/20 0821     Glucose 129 mg/dL      Comment: Serial Number: [id number]:  [id number]       Respiratory Culture - Sputum, Cough [[id number]] Collected: 09/22/20 2316    Specimen: Sputum from Cough Updated: 09/23/20 0810     Gram Stain Few (2+) WBCs per low power field      Rare (1+) Epithelial cells per low power field      Moderate (3+) Gram negative bacilli      Rare (1+) Gram positive cocci    Comprehensive Metabolic Panel [[id number]]  (Abnormal) Collected: 09/23/20 0425    Specimen: Blood Updated: 09/23/20 0615     Glucose 152 mg/dL      BUN --     Comment: Testing performed by alternate method        Creatinine 0.69 mg/dL      Sodium 142 mmol/L      Potassium 4.3 mmol/L      Chloride 106 mmol/L      CO2 26.0 mmol/L      Calcium 7.8 mg/dL      Total Protein 5.5 g/dL      Albumin 3.10 g/dL      ALT (SGPT) 73 U/L      AST (SGOT) 52 U/L      Alkaline Phosphatase 81 U/L      Total Bilirubin 0.4 mg/dL      eGFR Non African Amer 86 mL/min/1.73      Globulin 2.4 gm/dL      A/G Ratio 1.3 g/dL       BUN/Creatinine Ratio --     Comment: Testing not performed        Anion Gap 10.0 mmol/L     Narrative:      GFR Normal >60  Chronic Kidney Disease <60  Kidney Failure <15      CK [[id number]]  (Normal) Collected: 09/23/20 0425    Specimen: Blood Updated: 09/23/20 0615     Creatine Kinase 82 U/L     Ferritin [[id number]]  (Abnormal) Collected: 09/23/20 0425    Specimen: Blood Updated: 09/23/20 0557     Ferritin 1,422.00 ng/mL     Narrative:      Results may be falsely decreased if patient taking Biotin.      C-reactive Protein [[id number]]  (Abnormal) Collected: 09/23/20 0425    Specimen: Blood Updated: 09/23/20 0551     C-Reactive Protein 1.22 mg/dL     CBC & Differential [[id number]]  (Abnormal) Collected: 09/23/20 0425    Specimen: Blood Updated: 09/23/20 0516    Narrative:      The following orders were created for panel order CBC & Differential.  Procedure                               Abnormality         Status                     ---------                               -----------         ------                     CBC Auto Differential[[id number]]        Abnormal            Final result                 Please view results for these tests on the individual orders.    CBC Auto Differential [[id number]]  (Abnormal) Collected: 09/23/20 0425    Specimen: Blood Updated: 09/23/20 0516     WBC 7.90 10*3/mm3      RBC 4.23 10*6/mm3      Hemoglobin 13.4 g/dL      Hematocrit 39.8 %      MCV 94.1 fL      MCH 31.7 pg      MCHC 33.7 g/dL      RDW 13.8 %      RDW-SD 45.1 fl      MPV 8.0 fL      Platelets 334 10*3/mm3      Neutrophil % 82.3 %      Lymphocyte % 8.2 %      Monocyte % 9.4 %      Eosinophil % 0.0 %      Basophil % 0.1 %      Neutrophils, Absolute 6.50 10*3/mm3      Lymphocytes, Absolute 0.60 10*3/mm3      Monocytes, Absolute 0.70 10*3/mm3      Eosinophils, Absolute 0.00 10*3/mm3      Basophils, Absolute 0.00 10*3/mm3      nRBC 0.1 /100 WBC     POC Glucose Once [[id number]]  (Abnormal) Collected: 09/22/20 1957     Specimen: Blood Updated: 09/22/20 1959     Glucose 203 mg/dL      Comment: Serial Number: [id number]:  [id number]       POC Glucose Once [[id number]]  (Abnormal) Collected: 09/22/20 1710    Specimen: Blood Updated: 09/22/20 1713     Glucose 150 mg/dL      Comment: Serial Number: [id number]:  [id number]           No results found for: HGBA1C        Results from last 7 days   Lab Units 09/19/20  0959   PH, ARTERIAL pH units 7.471*   PO2 ART mm Hg 76.9*   PCO2, ARTERIAL mm Hg 31.8*   HCO3 ART mmol/L 23.2     No results found for: LIPASE  No results found for: CHOL, CHLPL, TRIG, HDL, LDL, LDLDIRECT    No results found for: INTRAOP, PREDX, FINALDX, COMDX    Microbiology Results (last 10 days)     Procedure Component Value - Date/Time    Respiratory Culture - Sputum, Cough [[id number]] Collected: 09/22/20 2316    Lab Status: Preliminary result Specimen: Sputum from Cough Updated: 09/23/20 0810     Gram Stain Few (2+) WBCs per low power field      Rare (1+) Epithelial cells per low power field      Moderate (3+) Gram negative bacilli      Rare (1+) Gram positive cocci    Respiratory Panel, PCR - Swab, Nasopharynx [[id number]]  (Normal) Collected: 09/19/20 1504    Lab Status: Final result Specimen: Swab from Nasopharynx Updated: 09/19/20 1651     ADENOVIRUS, PCR Not Detected     Coronavirus 229E Not Detected     Coronavirus HKU1 Not Detected     Coronavirus NL63 Not Detected     Coronavirus OC43 Not Detected     Human Metapneumovirus Not Detected     Human Rhinovirus/Enterovirus Not Detected     Influenza B PCR Not Detected     Parainfluenza Virus 1 Not Detected     Parainfluenza Virus 2 Not Detected     Parainfluenza Virus 3 Not Detected     Parainfluenza Virus 4 Not Detected     Bordetella pertussis pcr Not Detected     Influenza A H1 2009 PCR Not Detected     Chlamydophila pneumoniae PCR Not Detected     Mycoplasma pneumo by PCR Not Detected     Influenza A PCR Not Detected     Influenza A H3 Not Detected      Influenza A H1 Not Detected     RSV, PCR Not Detected     Bordetella parapertussis PCR Not Detected    Narrative:      The coronavirus on the RVP is NOT COVID-19 and is NOT indicative of infection with COVID-19.     Blood Culture - Blood, Arm, Left [[id number]] Collected: 09/19/20 0937    Lab Status: Preliminary result Specimen: Blood from Arm, Left Updated: 09/23/20 1000     Blood Culture No growth at 4 days    Blood Culture - Blood, Arm, Right [[id number]] Collected: 09/19/20 0937    Lab Status: Preliminary result Specimen: Blood from Arm, Right Updated: 09/23/20 0945     Blood Culture No growth at 4 days          ECG/EMG Results (most recent)     Procedure Component Value Units Date/Time    ECG 12 Lead [[id number]] Collected: 09/19/20 0944     Updated: 09/20/20 0831    Narrative:      HEART RATE= 110  bpm  RR Interval= 544  ms  NM Interval= 158  ms  P Horizontal Axis= 0  deg  P Front Axis= 61  deg  QRSD Interval= 84  ms  QT Interval= 319  ms  QRS Axis= 5  deg  T Wave Axis= 62  deg  - OTHERWISE NORMAL ECG -  Sinus tachycardia  No previous ECG available for comparison  Electronically Signed By: Reid Pollock ([facility]) 20-Sep-2020 08:30:38  Date and Time of Study: 2020-09-19 09:44:09    ECG 12 Lead [[id number]] Collected: 09/22/20 2310     Updated: 09/22/20 2311    Narrative:      HEART RATE= 58  bpm  RR Interval= 1040  ms  NM Interval= 165  ms  P Horizontal Axis= 1  deg  P Front Axis= 11  deg  QRSD Interval= 87  ms  QT Interval= 444  ms  QRS Axis= 15  deg  T Wave Axis= 45  deg  - OTHERWISE NORMAL ECG -  Sinus bradycardia  When compared with ECG of 19-Sep-2020 9:44:09,  Significant rate decrease  Electronically Signed By:   Date and Time of Study: 2020-09-22 23:10:08                    Xr Chest 1 View    Result Date: 9/21/2020  1. Slight improvement of the bilateral airspace opacities likely representing pneumonia.  Electronically Signed By-Flaquito Lewis On:9/21/2020 11:11 AM This report was finalized on [id number]  by  Flaquito Lewis .    Xr Chest Ap    Result Date: 9/19/2020  Multifocal consolidation involving the right midlung and lung bases bilaterally consistent with multifocal pneumonia, worsened from 09/10/2020.  Electronically Signed By-Ramiro Villa On:9/19/2020 10:30 AM This report was finalized on [id number] by  Ramiro Villa, .          Xrays, labs reviewed personally by physician.    Medication Review:   I have reviewed the patient's current medication list      Scheduled Meds  budesonide-formoterol, 2 puff, Inhalation, BID - RT  cefepime, 2 g, Intravenous, Q8H  cetirizine, 10 mg, Oral, Daily  dexamethasone, 6 mg, Intravenous, BID  enoxaparin, 40 mg, Subcutaneous, Q24H  INV [id number] remdesivir in NS IVPB, 100 mg, Intravenous, Q24H  pantoprazole, 40 mg, Oral, Q AM  sodium chloride, 10 mL, Intravenous, Q12H        Meds Infusions  Pharmacy Consult - Remdesivir,   Pharmacy Consult - Steroid Insulin Protocol,   Pharmacy to Dose Cefepime,         Meds PRN  •  acetaminophen **OR** acetaminophen **OR** acetaminophen  •  albuterol sulfate HFA  •  aluminum-magnesium hydroxide-simethicone  •  melatonin  •  nitroglycerin  •  ondansetron **OR** ondansetron  •  Pharmacy Consult - Remdesivir  •  Pharmacy Consult - Steroid Insulin Protocol  •  Pharmacy to Dose Cefepime  •  [COMPLETED] Insert peripheral IV **AND** sodium chloride  •  sodium chloride        Assessment/Plan   Assessment/Plan     Active Hospital Problems    Diagnosis  POA   • COVID-19 virus infection [U07.1]  Yes      Resolved Hospital Problems   No resolved problems to display.       MEDICAL DECISION MAKING COMPLEXITY BY PROBLEM:     Multifocal pneumonia due to COVID-19 infection  Tested positive for COVID-19 on 09/10/2020   respiratory viral panel insignificant  Chest x-ray reviewed-showed worsening of the focal pneumonia  on Decadron and remdesivir   Also started on azithromycin  Respiratory care with bronchodilator  Oxygen therapy and titration  Airborne and  contact precaution initiated  Monitor inflammatory markers  Consult pulmonary repeat chest x-ray  Patient is saturating 93% on 10 L nonrebreather.  She has been agreeable on conv alescent plasma transfusion, 2 units  Antibiotics additional cefepime  Improved oxygen saturation with less oxygen requirements  Normal liver enzymes slightly elevated \  dexamethasone was increased as well by and started on cefepime.  Pulmonologist      Fever-most likely due to above  Resolved  Acute hypoxic respiratory failure on chronic respiratory failure  Currently on 11 L of oxygen via nonrebreather mask  Oxygen therapy and titration  Blood cultures negative so far     DVT prophylaxis with Lovenox     Obesity BMI 30.0    Mechanical Order History:     None      Pharmalogical Order History:      Ordered     Dose Route Frequency Stop    09/19/20 1358  enoxaparin (LOVENOX) syringe 40 mg      40 mg SC Every 24 Hours --                  Code Status -   Code Status and Medical Interventions:   Ordered at: 09/19/20 1358     Level Of Support Discussed With:    Patient     Code Status:    CPR     Medical Interventions (Level of Support Prior to Arrest):    Full       This patient has been examined with appropriate PPE. 09/23/20        Discharge Planning  Pending clinical progress          Electronically signed by Lenin Brooks MD, 09/23/20, 11:36 EDT.  [facility] Hospitalist Team

## 2020-09-23 NOTE — PLAN OF CARE
Problem: Adult Inpatient Plan of Care  Goal: Plan of Care Review  Outcome: Ongoing, Progressing  Flowsheets  Taken 9/23/2020 1247 by Jaimee Bell RN  Outcome Summary: patient currently on 2L of oxygen.  Taken 9/23/2020 0514 by Edis Miller RN  Progress: improving  Plan of Care Reviewed With: patient  Goal: Patient-Specific Goal (Individualized)  Outcome: Ongoing, Progressing  Goal: Absence of Hospital-Acquired Illness or Injury  Outcome: Ongoing, Progressing  Intervention: Identify and Manage Fall Risk  Recent Flowsheet Documentation  Taken 9/23/2020 0951 by Jaimee Bell RN  Safety Promotion/Fall Prevention:   assistive device/personal items within reach   nonskid shoes/slippers when out of bed   safety round/check completed  Taken 9/23/2020 0730 by Jaimee Bell RN  Safety Promotion/Fall Prevention:   assistive device/personal items within reach   nonskid shoes/slippers when out of bed   safety round/check completed  Intervention: Prevent Infection  Recent Flowsheet Documentation  Taken 9/23/2020 0951 by Jaimee Bell RN  Infection Prevention: personal protective equipment utilized  Taken 9/23/2020 0730 by Jaimee Bell RN  Infection Prevention: personal protective equipment utilized  Goal: Optimal Comfort and Wellbeing  Outcome: Ongoing, Progressing  Intervention: Provide Person-Centered Care  Recent Flowsheet Documentation  Taken 9/23/2020 0730 by Jaimee Bell RN  Trust Relationship/Rapport: care explained  Goal: Readiness for Transition of Care  Outcome: Ongoing, Progressing     Problem: ARDS (Acute Respiratory Distress Syndrome)  Goal: Effective Oxygenation  Outcome: Ongoing, Progressing   Goal Outcome Evaluation:  Plan of Care Reviewed With: patient  Progress: improving  Outcome Summary: patient currently on 2L of oxygen.

## 2020-09-23 NOTE — PLAN OF CARE
Goal Outcome Evaluation:  Plan of Care Reviewed With: patient  Progress: improving  Outcome Summary: Patient has rested well throughout the night. Patient is on 4L Nasal cannula currently. Hospitalist service was contacted during shift in regard to patient showing sinus bradycardia intermittently. Patient has voiced no complaints. Will continue to monitor.

## 2020-09-24 ENCOUNTER — READMISSION MANAGEMENT (OUTPATIENT)
Dept: CALL CENTER | Facility: HOSPITAL | Age: 65
End: 2020-09-24

## 2020-09-24 VITALS
RESPIRATION RATE: 21 BRPM | TEMPERATURE: 97.5 F | DIASTOLIC BLOOD PRESSURE: 71 MMHG | HEART RATE: 88 BPM | WEIGHT: 222 LBS | SYSTOLIC BLOOD PRESSURE: 129 MMHG | OXYGEN SATURATION: 91 % | HEIGHT: 64 IN | BODY MASS INDEX: 37.9 KG/M2

## 2020-09-24 LAB
ALBUMIN SERPL-MCNC: 3 G/DL (ref 3.5–5.2)
ALBUMIN/GLOB SERPL: 1.3 G/DL
ALP SERPL-CCNC: 74 U/L (ref 39–117)
ALT SERPL W P-5'-P-CCNC: 56 U/L (ref 1–33)
ANION GAP SERPL CALCULATED.3IONS-SCNC: 7 MMOL/L (ref 5–15)
AST SERPL-CCNC: 27 U/L (ref 1–32)
BACTERIA SPEC AEROBE CULT: NORMAL
BACTERIA SPEC AEROBE CULT: NORMAL
BASOPHILS # BLD AUTO: 0 10*3/MM3 (ref 0–0.2)
BASOPHILS NFR BLD AUTO: 0.1 % (ref 0–1.5)
BILIRUB SERPL-MCNC: 0.5 MG/DL (ref 0–1.2)
BUN SERPL-MCNC: 22 MG/DL (ref 8–23)
BUN SERPL-MCNC: ABNORMAL MG/DL
BUN/CREAT SERPL: ABNORMAL
CALCIUM SPEC-SCNC: 7.6 MG/DL (ref 8.6–10.5)
CHLORIDE SERPL-SCNC: 106 MMOL/L (ref 98–107)
CK SERPL-CCNC: 66 U/L (ref 20–180)
CO2 SERPL-SCNC: 27 MMOL/L (ref 22–29)
CREAT SERPL-MCNC: 0.65 MG/DL (ref 0.57–1)
CRP SERPL-MCNC: 0.71 MG/DL (ref 0–0.5)
D DIMER PPP FEU-MCNC: 1.2 MG/L (FEU) (ref 0–0.59)
DEPRECATED RDW RBC AUTO: 44.6 FL (ref 37–54)
EOSINOPHIL # BLD AUTO: 0 10*3/MM3 (ref 0–0.4)
EOSINOPHIL NFR BLD AUTO: 0.1 % (ref 0.3–6.2)
ERYTHROCYTE [DISTWIDTH] IN BLOOD BY AUTOMATED COUNT: 13.6 % (ref 12.3–15.4)
FERRITIN SERPL-MCNC: 1270 NG/ML (ref 13–150)
FIBRINOGEN PPP-MCNC: 303 MG/DL (ref 210–450)
GFR SERPL CREATININE-BSD FRML MDRD: 92 ML/MIN/1.73
GLOBULIN UR ELPH-MCNC: 2.3 GM/DL
GLUCOSE BLDC GLUCOMTR-MCNC: 100 MG/DL (ref 70–105)
GLUCOSE BLDC GLUCOMTR-MCNC: 124 MG/DL (ref 70–105)
GLUCOSE BLDC GLUCOMTR-MCNC: 136 MG/DL (ref 70–105)
GLUCOSE SERPL-MCNC: 79 MG/DL (ref 65–99)
HCT VFR BLD AUTO: 42.3 % (ref 34–46.6)
HGB BLD-MCNC: 14.3 G/DL (ref 12–15.9)
LDH SERPL-CCNC: 299 U/L (ref 135–214)
LYMPHOCYTES # BLD AUTO: 1.8 10*3/MM3 (ref 0.7–3.1)
LYMPHOCYTES NFR BLD AUTO: 23.5 % (ref 19.6–45.3)
MCH RBC QN AUTO: 31.5 PG (ref 26.6–33)
MCHC RBC AUTO-ENTMCNC: 33.8 G/DL (ref 31.5–35.7)
MCV RBC AUTO: 93.4 FL (ref 79–97)
MONOCYTES # BLD AUTO: 0.9 10*3/MM3 (ref 0.1–0.9)
MONOCYTES NFR BLD AUTO: 12.3 % (ref 5–12)
NEUTROPHILS NFR BLD AUTO: 4.9 10*3/MM3 (ref 1.7–7)
NEUTROPHILS NFR BLD AUTO: 64 % (ref 42.7–76)
NRBC BLD AUTO-RTO: 0.2 /100 WBC (ref 0–0.2)
PLATELET # BLD AUTO: 363 10*3/MM3 (ref 140–450)
PMV BLD AUTO: 7.9 FL (ref 6–12)
POTASSIUM SERPL-SCNC: 4 MMOL/L (ref 3.5–5.2)
PROT SERPL-MCNC: 5.3 G/DL (ref 6–8.5)
RBC # BLD AUTO: 4.53 10*6/MM3 (ref 3.77–5.28)
SODIUM SERPL-SCNC: 140 MMOL/L (ref 136–145)
WBC # BLD AUTO: 7.7 10*3/MM3 (ref 3.4–10.8)

## 2020-09-24 PROCEDURE — 82962 GLUCOSE BLOOD TEST: CPT

## 2020-09-24 PROCEDURE — 83615 LACTATE (LD) (LDH) ENZYME: CPT | Performed by: INTERNAL MEDICINE

## 2020-09-24 PROCEDURE — 25010000002 CEFEPIME PER 500 MG: Performed by: INTERNAL MEDICINE

## 2020-09-24 PROCEDURE — 94799 UNLISTED PULMONARY SVC/PX: CPT

## 2020-09-24 PROCEDURE — 80053 COMPREHEN METABOLIC PANEL: CPT | Performed by: INTERNAL MEDICINE

## 2020-09-24 PROCEDURE — 85379 FIBRIN DEGRADATION QUANT: CPT | Performed by: INTERNAL MEDICINE

## 2020-09-24 PROCEDURE — 99239 HOSP IP/OBS DSCHRG MGMT >30: CPT | Performed by: INTERNAL MEDICINE

## 2020-09-24 PROCEDURE — 25010000002 DEXAMETHASONE SODIUM PHOSPHATE 10 MG/ML SOLUTION: Performed by: INTERNAL MEDICINE

## 2020-09-24 PROCEDURE — 94618 PULMONARY STRESS TESTING: CPT

## 2020-09-24 PROCEDURE — 86140 C-REACTIVE PROTEIN: CPT | Performed by: INTERNAL MEDICINE

## 2020-09-24 PROCEDURE — 82550 ASSAY OF CK (CPK): CPT | Performed by: INTERNAL MEDICINE

## 2020-09-24 PROCEDURE — 82728 ASSAY OF FERRITIN: CPT | Performed by: INTERNAL MEDICINE

## 2020-09-24 PROCEDURE — 85025 COMPLETE CBC W/AUTO DIFF WBC: CPT | Performed by: INTERNAL MEDICINE

## 2020-09-24 PROCEDURE — 85384 FIBRINOGEN ACTIVITY: CPT | Performed by: INTERNAL MEDICINE

## 2020-09-24 RX ORDER — POLYETHYLENE GLYCOL 3350 17 G/17G
17 POWDER, FOR SOLUTION ORAL DAILY
Qty: 238 G | Refills: 0 | Status: SHIPPED | OUTPATIENT
Start: 2020-09-25 | End: 2020-10-05

## 2020-09-24 RX ORDER — CEFDINIR 300 MG/1
300 CAPSULE ORAL EVERY 12 HOURS SCHEDULED
Status: DISCONTINUED | OUTPATIENT
Start: 2020-09-24 | End: 2020-09-24 | Stop reason: HOSPADM

## 2020-09-24 RX ORDER — PANTOPRAZOLE SODIUM 40 MG/1
40 TABLET, DELAYED RELEASE ORAL DAILY
Qty: 14 TABLET | Refills: 0 | Status: SHIPPED | OUTPATIENT
Start: 2020-09-24 | End: 2020-10-08

## 2020-09-24 RX ORDER — DEXAMETHASONE 6 MG/1
6 TABLET ORAL DAILY
Status: DISCONTINUED | OUTPATIENT
Start: 2020-09-25 | End: 2020-09-24 | Stop reason: HOSPADM

## 2020-09-24 RX ORDER — DEXAMETHASONE 6 MG/1
6 TABLET ORAL DAILY
Qty: 5 TABLET | Refills: 0 | Status: SHIPPED | OUTPATIENT
Start: 2020-09-25 | End: 2020-09-30

## 2020-09-24 RX ORDER — CEFDINIR 300 MG/1
300 CAPSULE ORAL EVERY 12 HOURS SCHEDULED
Qty: 6 CAPSULE | Refills: 0 | Status: SHIPPED | OUTPATIENT
Start: 2020-09-24 | End: 2020-09-27

## 2020-09-24 RX ADMIN — DEXAMETHASONE SODIUM PHOSPHATE 6 MG: 10 INJECTION, SOLUTION INTRAMUSCULAR; INTRAVENOUS at 08:50

## 2020-09-24 RX ADMIN — PANTOPRAZOLE SODIUM 40 MG: 40 TABLET, DELAYED RELEASE ORAL at 06:02

## 2020-09-24 RX ADMIN — POLYETHYLENE GLYCOL 3350 17 G: 17 POWDER, FOR SOLUTION ORAL at 08:50

## 2020-09-24 RX ADMIN — CETIRIZINE HYDROCHLORIDE 10 MG: 10 TABLET, FILM COATED ORAL at 08:50

## 2020-09-24 RX ADMIN — Medication 10 ML: at 09:12

## 2020-09-24 RX ADMIN — BUDESONIDE AND FORMOTEROL FUMARATE DIHYDRATE 2 PUFF: 160; 4.5 AEROSOL RESPIRATORY (INHALATION) at 08:16

## 2020-09-24 RX ADMIN — CEFEPIME 2 G: 2 INJECTION, POWDER, FOR SOLUTION INTRAVENOUS at 00:22

## 2020-09-24 RX ADMIN — CEFEPIME 2 G: 2 INJECTION, POWDER, FOR SOLUTION INTRAVENOUS at 08:51

## 2020-09-24 NOTE — PLAN OF CARE
Problem: Adult Inpatient Plan of Care  Goal: Plan of Care Review  Outcome: Ongoing, Progressing  Flowsheets (Taken 9/24/2020 0528)  Progress: improving  Plan of Care Reviewed With: patient  Outcome Summary: Patient has rested well throughout shift with no complaints. Patient has experienced no acute events during shift. Will continue to monitor.   Goal Outcome Evaluation:  Plan of Care Reviewed With: patient  Progress: improving  Outcome Summary: Patient has rested well throughout shift with no complaints. Patient has experienced no acute events during shift. Will continue to monitor.

## 2020-09-24 NOTE — PROGRESS NOTES
Continued Stay Note  AL[place]AND[place] Rojas     Patient Name: Monisha Batista  MRN: [id number]  Today's Date: 9/24/2020    Admit Date: 9/19/2020    Discharge Plan     Row Name 09/24/20 1503       Plan    Plan  DC Plan: Return home w/spouse. Continuous home O2 per [employer], ordered 1504.               Expected Discharge Date and Time     Expected Discharge Date Expected Discharge Time    Sep 24, 2020             Jas Valdez RN

## 2020-09-24 NOTE — DISCHARGE SUMMARY
[facility] Medicine Services  DISCHARGE SUMMARY        Prepared For PCP:  Polly Domingo MD    Patient Name: Monisha Batista  : 1955  MRN: [id number]      Date of Admission:   2020    Date of Discharge:  2020    Length of stay:  LOS: 5 days     Hospital Course     Presenting Problem:   Hypoxia [R09.02]  Fever in adult [R50.9]  Pneumonia of both lungs due to infectious organism, unspecified part of lung [J18.9]  COVID-19 virus infection [U07.1]  COVID-19 virus infection [U07.1]      Active Hospital Problems    Diagnosis  POA   • COVID-19 virus infection [U07.1]  Yes      Resolved Hospital Problems   No resolved problems to display.     Viral pneumonia/H CAP  Elevated procalcitonin  COVID-19 positive  Respiratory failure acute on admission  Chronic nocturnal hypoxemia  Obesity        Hospital Course:  Monisha Batista is a 65 y.o. female     Patient is a 65-year-old female with history of COPD on home oxygen 2.5 L at bedtime.  Presented to the emergency room on 2020 with complaints of cough productive of brownish sputum, progressively worsening shortness of breath and body aches.  Patient stated symptoms started about 10 days ago  and she had gone to an outlying urgent care facility where she was diagnosed with COVID-19 on 2020.  Since then her symptoms continue to worsen and she now has to use her home oxygen continuously.  Related having subjective fever and chills  Of note is that her  is also positive for COVID-19.  In the ED, temperature was 101.6 and patient 's oxygen saturation was 86% on room air improved to 93% with 5 L of oxygen via nasal cannula.  Chest x-ray done showed worsening multifocal pneumonia.       After admission patient treated with dexamethasone Remdesivir and Zithromax.  Antibiotics changed to cefepime and dexamethasone increased secondary to worsening of her symptoms about 3 days after admission.  She had received 2 units of  bolus of plasma with significant provement in her oxygen saturation.  She has home oxygen at night.awaiting 6-minute walk test prior to discharge to evaluate her oxygen requirements.  She was cleared for discharge by pulmonologist.  On discharge patient will be continued on antibiotics with Omnicef and dexamethasone.      Recommendation for Outpatient Providers:             Reasons For Change In Medications and Indications for New Medications:        Day of Discharge     HPI:       Vital Signs:   Temp:  [97 °F (36.1 °C)-98.4 °F (36.9 °C)] 97.2 °F (36.2 °C)  Heart Rate:  [] 77  Resp:  [18-24] 18  BP: (117-142)/(72-79) 125/76     Physical Exam:  Physical Exam  Improved air entry bilaterally with minimal rales at the bases.  Cardiopulmonary exam otherwise unremarkable  Pertinent  and/or Most Recent Results     Results from last 7 days   Lab Units 09/24/20  0736 09/23/20  0425 09/22/20  0530 09/21/20  0819 09/21/20  0349 09/20/20  0322 09/19/20  1009 09/19/20  0937   WBC 10*3/mm3 7.70 7.90 6.50 6.00  --  3.50  --  6.20   HEMOGLOBIN g/dL 14.3 13.4 13.6 13.8  --  14.0  --  14.7   HEMATOCRIT % 42.3 39.8 41.0 41.9  --  41.2  --  43.2   PLATELETS 10*3/mm3 363 334 292 277  --  207  --  204   SODIUM mmol/L 140 142 142 140 140 138 133*  --    POTASSIUM mmol/L 4.0 4.3 4.4 4.0 4.2 3.9 3.6  --    CHLORIDE mmol/L 106 106 105 104 104 102 97*  --    CO2 mmol/L 27.0 26.0 30.0* 28.0 26.0 26.0 25.0  --    BUN   --  20 20 16 16 10 7*  --    CREATININE mg/dL 0.65 0.69 0.63 0.63 0.59 0.53* 0.57  --    GLUCOSE mg/dL 79 152* 145* 132* 141* 129* 118*  --    CALCIUM mg/dL 7.6* 7.8* 8.1* 7.9* 7.9* 7.9* 8.0*  --      Results from last 7 days   Lab Units 09/24/20  0736 09/23/20  0425 09/22/20  0530 09/21/20  0349 09/20/20  0322 09/19/20  1009   BILIRUBIN mg/dL 0.5 0.4 0.3 0.3 0.2 0.3   ALK PHOS U/L 74 81 74 64 64 61   ALT (SGPT) U/L 56* 73* 48* 42* 45* 45*   AST (SGOT) U/L 27 52* 47* 52* 63* 67*           Invalid input(s): TG, LDLCALC,  LDLREALC  Results from last 7 days   Lab Units 09/21/20  0819 09/19/20  1009 09/19/20  0935   PROBNP pg/mL 516.2  --   --    TROPONIN T ng/mL  --  <0.010  --    PROCALCITONIN ng/mL  --  0.55*  --    LACTATE mmol/L  --   --  1.0       Brief Urine Lab Results     None          Microbiology Results Abnormal     Procedure Component Value - Date/Time    Blood Culture - Blood, Arm, Left [[id number]] Collected: 09/19/20 0937    Lab Status: Final result Specimen: Blood from Arm, Left Updated: 09/24/20 1001     Blood Culture No growth at 5 days    Blood Culture - Blood, Arm, Right [[id number]] Collected: 09/19/20 0937    Lab Status: Final result Specimen: Blood from Arm, Right Updated: 09/24/20 1001     Blood Culture No growth at 5 days    Respiratory Culture - Sputum, Cough [[id number]] Collected: 09/22/20 2316    Lab Status: Preliminary result Specimen: Sputum from Cough Updated: 09/24/20 0726     Respiratory Culture Scant growth (1+) Normal Respiratory Nida: NO S.aureus/MRSA or Pseudomonas aeruginosa     Gram Stain Few (2+) WBCs per low power field      Rare (1+) Epithelial cells per low power field      Moderate (3+) Gram negative bacilli      Rare (1+) Gram positive cocci    Respiratory Panel, PCR - Swab, Nasopharynx [[id number]]  (Normal) Collected: 09/19/20 1504    Lab Status: Final result Specimen: Swab from Nasopharynx Updated: 09/19/20 1651     ADENOVIRUS, PCR Not Detected     Coronavirus 229E Not Detected     Coronavirus HKU1 Not Detected     Coronavirus NL63 Not Detected     Coronavirus OC43 Not Detected     Human Metapneumovirus Not Detected     Human Rhinovirus/Enterovirus Not Detected     Influenza B PCR Not Detected     Parainfluenza Virus 1 Not Detected     Parainfluenza Virus 2 Not Detected     Parainfluenza Virus 3 Not Detected     Parainfluenza Virus 4 Not Detected     Bordetella pertussis pcr Not Detected     Influenza A H1 2009 PCR Not Detected     Chlamydophila pneumoniae PCR Not Detected     Mycoplasma  pneumo by PCR Not Detected     Influenza A PCR Not Detected     Influenza A H3 Not Detected     Influenza A H1 Not Detected     RSV, PCR Not Detected     Bordetella parapertussis PCR Not Detected    Narrative:      The coronavirus on the RVP is NOT COVID-19 and is NOT indicative of infection with COVID-19.           Xr Chest 2 View    Result Date: 9/10/2020  Impression: Bibasilar discoid atelectasis or scarring. The chest appears otherwise clear.  Electronically Signed By-Evert Krishnan On:9/10/2020 1:27 PM This report was finalized on [id number] by  Velvet Oneil    Xr Chest 1 View    Result Date: 9/21/2020  Impression: 1. Slight improvement of the bilateral airspace opacities likely representing pneumonia.  Electronically Signed By-Flaquito Lewis On:9/21/2020 11:11 AM This report was finalized on [id number] by  Velvet Echevarria    Xr Chest Ap    Result Date: 9/19/2020  Impression: Multifocal consolidation involving the right midlung and lung bases bilaterally consistent with multifocal pneumonia, worsened from 09/10/2020.  Electronically Signed By-Ramiro Villa On:9/19/2020 10:30 AM This report was finalized on [id number] by  Velvet Duque                             Test Results Pending at Discharge  Pending Labs     Order Current Status    BUN In process    Respiratory Culture - Sputum, Cough Preliminary result            Procedures Performed           Consults:   Consults     Date and Time Order Name Status Description    9/21/2020 1014 Inpatient Pulmonology Consult Completed             Discharge Details        Discharge Medications      New Medications      Instructions Start Date   cefdinir 300 MG capsule  Commonly known as: OMNICEF   300 mg, Oral, Every 12 Hours Scheduled      dexamethasone 6 MG tablet  Commonly known as: DECADRON   6 mg, Oral, Daily   Start Date: September 25, 2020     pantoprazole 40 MG EC tablet  Commonly known as: PROTONIX   40 mg, Oral, Daily      polyethylene glycol 17 g  packet  Commonly known as: MIRALAX   17 g, Oral, Daily   Start Date: September 25, 2020        Continue These Medications      Instructions Start Date   albuterol (2.5 MG/3ML) 0.083% nebulizer solution  Commonly known as: PROVENTIL   2.5 mg, Nebulization, Every 6 Hours PRN      albuterol sulfate  (90 Base) MCG/ACT inhaler  Commonly known as: PROVENTIL HFA;VENTOLIN HFA;PROAIR HFA   2 puffs, Inhalation, Every 4 Hours PRN      brompheniramine-pseudoephedrine-DM 30-2-10 MG/5ML syrup   5 mL, Oral, 4 Times Daily PRN      loratadine 10 MG tablet  Commonly known as: CLARITIN   10 mg, Oral, Every Night at Bedtime      Trelegy Ellipta 100-62.5-25 MCG/INH aerosol powder   Generic drug: Fluticasone-Umeclidin-Vilant   1 puff, Inhalation, Daily         Stop These Medications    doxycycline 100 MG capsule  Commonly known as: VIBRAMYCIN            Allergies   Allergen Reactions   • Sulfa Antibiotics Anaphylaxis         Discharge Disposition:  Home or Self Care    Diet:  Hospital:  Diet Order   Procedures   • Diet Regular         Discharge Activity:   Activity Instructions     AS tolerated                 CODE STATUS:    Code Status and Medical Interventions:   Ordered at: 09/19/20 1358     Level Of Support Discussed With:    Patient     Code Status:    CPR     Medical Interventions (Level of Support Prior to Arrest):    Full         Follow-up Appointments  No future appointments.          Condition on Discharge:      Stable      This patient has been examined wearing appropriate Personal Protective Equipment and discussed with pulmonologist. 09/24/20      Electronically signed by Lenin Brooks MD, 09/24/20, 2:05 PM EDT.      Time: I spent  40  minutes on this discharge activity which included face-to-face encounter with the patient/reviewing the data in the system/coordination of the care with the nursing staff as well as consultants/documentation/entering orders.

## 2020-09-24 NOTE — PROGRESS NOTES
Exercise Oximetry    Patient Name:Monisha Batista   MRN: [id number]   Date: 09/24/20             ROOM AIR BASELINE   SpO2% 86   Heart Rate 93   Blood Pressure      EXERCISE ON ROOM AIR SpO2% EXERCISE ON O2 @ 2 LPM SpO2%   1 MINUTE  1 MINUTE 86 with ambulation.   2 MINUTES  2 MINUTES 86 with ambulation   3 MINUTES  3 MINUTES 86 with ambulation   4 MINUTES  4 MINUTES 88 with ambulation    5 MINUTES  5 MINUTES 90 with ambulation   6 MINUTES  6 MINUTES 92 with ambulation              Distance Walked   Distance Walked   Dyspnea (Mirian Scale)   Dyspnea (Mirian Scale)   Fatigue (Mirian Scale)   Fatigue (Mirian Scale)   SpO2% Post Exercise   SpO2% Post Exercise  91    HR Post Exercise   HR Post Exercise 89    Time to Recovery   Time to Recovery  10 minutes.     Comments: Patient 86% on room air after ambulation to and from restroom.  Placed patient back on 2 liters, see above documentation.  Patient appears short of breath.  Patient has home 02 at .

## 2020-09-24 NOTE — PLAN OF CARE
Goal Outcome Evaluation:  Plan of Care Reviewed With: patient  Progress: improving     Problem: Adult Inpatient Plan of Care  Goal: Plan of Care Review  Outcome: Ongoing, Progressing  Flowsheets  Taken 9/24/2020 1343 by Suzie Price, RN  Progress: improving  Taken 9/24/2020 0563 by Edis Miller, RN  Outcome Summary: Patient has rested well throughout shift with no complaints. Patient has experienced no acute events during shift. Will continue to monitor.

## 2020-09-25 ENCOUNTER — READMISSION MANAGEMENT (OUTPATIENT)
Dept: CALL CENTER | Facility: HOSPITAL | Age: 65
End: 2020-09-25

## 2020-09-25 LAB
BACTERIA SPEC RESP CULT: NORMAL
BH BB BLOOD EXPIRATION DATE: NORMAL
BH BB BLOOD EXPIRATION DATE: NORMAL
BH BB BLOOD TYPE BARCODE: 5100
BH BB BLOOD TYPE BARCODE: 5100
BH BB DISPENSE STATUS: NORMAL
BH BB DISPENSE STATUS: NORMAL
BH BB PRODUCT CODE: NORMAL
BH BB PRODUCT CODE: NORMAL
BH BB UNIT NUMBER: NORMAL
BH BB UNIT NUMBER: NORMAL
CROSSMATCH INTERPRETATION: NORMAL
CROSSMATCH INTERPRETATION: NORMAL
GRAM STN SPEC: NORMAL
UNIT  ABO: NORMAL
UNIT  ABO: NORMAL
UNIT  RH: NORMAL
UNIT  RH: NORMAL

## 2020-09-25 NOTE — PROGRESS NOTES
Continued Stay Note  ALEXANDRIA Rojas     Patient Name: Monisha Batista  MRN: [id number]  Today's Date: 9/25/2020    Admit Date: 9/19/2020    Discharge Plan     Row Name 09/25/20 0904       Plan    Final Discharge Disposition Code  01 - home or self-care          Expected Discharge Date and Time     Expected Discharge Date Expected Discharge Time    Sep 24, 2020             Darline Brown RN

## 2020-09-25 NOTE — PAYOR COMM NOTE
"This is discharge notice for Monisha Batista, auth# [id number]  Pt discharged routine to home on 9/24/20.    [place] CANCHOLA RN  UTILIZATION REVIEW  [facility]  PH: [phone]  FX: [phone]      Monisha Wilder (65 y.o. Female)     Date of Birth Social Security Number Address Home Phone MRN    1955  [address] [place] [address] [phone] [id number]    Hinduism Marital Status          Non-Spiritism        Admission Date Admission Type Admitting Provider Attending Provider Department, Room/Bed    9/19/20 Emergency Todd, Lenin Chan MD  [facility] 2A PED[facility], 201/1    Discharge Date Discharge Disposition Discharge Destination        9/24/2020 Home or Self Care              Attending Provider: (none)   Allergies: Sulfa Antibiotics    Isolation: Enh Drop/Con   Infection: COVID (confirmed) (09/12/20)   Code Status: Prior    Ht: 162.6 cm (64\")   Wt: 101 kg (222 lb)    Admission Cmt: None   Principal Problem: COVID-19 virus infection [U07.1]                 Active Insurance as of 9/19/2020     Primary Coverage     Payor Plan Insurance Group Employer/Plan Group    [facility] BLUE CROSS [facility] BLUE CROSS BLUE [facility] PPO [id number]     Payor Plan Address Payor Plan Phone Number Payor Plan Fax Number Effective Dates    [address] [phone]  1/1/2020 - None Entered    [facility] [id number]       Subscriber Name Subscriber Birth Date Member ID       CHENTE BATISTA 1955 [id number]           Secondary Coverage     Payor Plan Insurance Group Employer/Plan Group    MEDICARE MEDICARE A & B      Payor Plan Address Payor Plan Phone Number Payor Plan Fax Number Effective Dates    [address] [phone]  1/1/2013 - None Entered    [facility] [id number]       Subscriber Name Subscriber Birth Date Member ID       MONISHA WILDER 1955 [id number]                 Emergency Contacts      (Rel.) Home Phone Work Phone Mobile Phone   "    CHENTE BATISTA (Spouse) -- -- [phone]               Discharge Summary      Lenin Brooks MD at 20 1405                [facility] Medicine Services  DISCHARGE SUMMARY        Prepared For PCP:  Polly Domingo MD    Patient Name: Monisha Batista  : 1955  MRN: [id number]      Date of Admission:   2020    Date of Discharge:  2020    Length of stay:  LOS: 5 days     Hospital Course     Presenting Problem:   Hypoxia [R09.02]  Fever in adult [R50.9]  Pneumonia of both lungs due to infectious organism, unspecified part of lung [J18.9]  COVID-19 virus infection [U07.1]  COVID-19 virus infection [U07.1]      Active Hospital Problems    Diagnosis  POA   • COVID-19 virus infection [U07.1]  Yes      Resolved Hospital Problems   No resolved problems to display.     Viral pneumonia/H CAP  Elevated procalcitonin  COVID-19 positive  Respiratory failure acute on admission  Chronic nocturnal hypoxemia  Obesity        Hospital Course:  Monisha Batista is a 65 y.o. female     Patient is a 65-year-old female with history of COPD on home oxygen 2.5 L at bedtime.  Presented to the emergency room on 2020 with complaints of cough productive of brownish sputum, progressively worsening shortness of breath and body aches.  Patient stated symptoms started about 10 days ago  and she had gone to an outlying urgent care facility where she was diagnosed with COVID-19 on 2020.  Since then her symptoms continue to worsen and she now has to use her home oxygen continuously.  Related having subjective fever and chills  Of note is that her  is also positive for COVID-19.  In the ED, temperature was 101.6 and patient 's oxygen saturation was 86% on room air improved to 93% with 5 L of oxygen via nasal cannula.  Chest x-ray done showed worsening multifocal pneumonia.       After admission patient treated with dexamethasone Remdesivir and Zithromax.  Antibiotics  changed to cefepime and dexamethasone increased secondary to worsening of her symptoms about 3 days after admission.  She had received 2 units of bolus of plasma with significant provement in her oxygen saturation.  She has home oxygen at night.awaiting 6-minute walk test prior to discharge to evaluate her oxygen requirements.  She was cleared for discharge by pulmonologist.  On discharge patient will be continued on antibiotics with Omnicef and dexamethasone.      Recommendation for Outpatient Providers:             Reasons For Change In Medications and Indications for New Medications:        Day of Discharge     HPI:       Vital Signs:   Temp:  [97 °F (36.1 °C)-98.4 °F (36.9 °C)] 97.2 °F (36.2 °C)  Heart Rate:  [] 77  Resp:  [18-24] 18  BP: (117-142)/(72-79) 125/76     Physical Exam:  Physical Exam  Improved air entry bilaterally with minimal rales at the bases.  Cardiopulmonary exam otherwise unremarkable  Pertinent  and/or Most Recent Results     Results from last 7 days   Lab Units 09/24/20  0736 09/23/20  0425 09/22/20  0530 09/21/20  0819 09/21/20  0349 09/20/20  0322 09/19/20  1009 09/19/20  0937   WBC 10*3/mm3 7.70 7.90 6.50 6.00  --  3.50  --  6.20   HEMOGLOBIN g/dL 14.3 13.4 13.6 13.8  --  14.0  --  14.7   HEMATOCRIT % 42.3 39.8 41.0 41.9  --  41.2  --  43.2   PLATELETS 10*3/mm3 363 334 292 277  --  207  --  204   SODIUM mmol/L 140 142 142 140 140 138 133*  --    POTASSIUM mmol/L 4.0 4.3 4.4 4.0 4.2 3.9 3.6  --    CHLORIDE mmol/L 106 106 105 104 104 102 97*  --    CO2 mmol/L 27.0 26.0 30.0* 28.0 26.0 26.0 25.0  --    BUN   --  20 20 16 16 10 7*  --    CREATININE mg/dL 0.65 0.69 0.63 0.63 0.59 0.53* 0.57  --    GLUCOSE mg/dL 79 152* 145* 132* 141* 129* 118*  --    CALCIUM mg/dL 7.6* 7.8* 8.1* 7.9* 7.9* 7.9* 8.0*  --      Results from last 7 days   Lab Units 09/24/20  0736 09/23/20  0425 09/22/20  0530 09/21/20  0349 09/20/20  0322 09/19/20  1009   BILIRUBIN mg/dL 0.5 0.4 0.3 0.3 0.2 0.3   ALK PHOS  U/L 74 81 74 64 64 61   ALT (SGPT) U/L 56* 73* 48* 42* 45* 45*   AST (SGOT) U/L 27 52* 47* 52* 63* 67*           Invalid input(s): TG, LDLCALC, LDLREALC  Results from last 7 days   Lab Units 09/21/20  0819 09/19/20  1009 09/19/20  0935   PROBNP pg/mL 516.2  --   --    TROPONIN T ng/mL  --  <0.010  --    PROCALCITONIN ng/mL  --  0.55*  --    LACTATE mmol/L  --   --  1.0       Brief Urine Lab Results     None          Microbiology Results Abnormal     Procedure Component Value - Date/Time    Blood Culture - Blood, Arm, Left [[id number]] Collected: 09/19/20 0937    Lab Status: Final result Specimen: Blood from Arm, Left Updated: 09/24/20 1001     Blood Culture No growth at 5 days    Blood Culture - Blood, Arm, Right [[id number]] Collected: 09/19/20 0937    Lab Status: Final result Specimen: Blood from Arm, Right Updated: 09/24/20 1001     Blood Culture No growth at 5 days    Respiratory Culture - Sputum, Cough [[id number]] Collected: 09/22/20 2316    Lab Status: Preliminary result Specimen: Sputum from Cough Updated: 09/24/20 0726     Respiratory Culture Scant growth (1+) Normal Respiratory Nida: NO S.aureus/MRSA or Pseudomonas aeruginosa     Gram Stain Few (2+) WBCs per low power field      Rare (1+) Epithelial cells per low power field      Moderate (3+) Gram negative bacilli      Rare (1+) Gram positive cocci    Respiratory Panel, PCR - Swab, Nasopharynx [[id number]]  (Normal) Collected: 09/19/20 1504    Lab Status: Final result Specimen: Swab from Nasopharynx Updated: 09/19/20 1651     ADENOVIRUS, PCR Not Detected     Coronavirus 229E Not Detected     Coronavirus HKU1 Not Detected     Coronavirus NL63 Not Detected     Coronavirus OC43 Not Detected     Human Metapneumovirus Not Detected     Human Rhinovirus/Enterovirus Not Detected     Influenza B PCR Not Detected     Parainfluenza Virus 1 Not Detected     Parainfluenza Virus 2 Not Detected     Parainfluenza Virus 3 Not Detected     Parainfluenza Virus 4 Not Detected        Bordetella pertussis pcr Not Detected     Influenza A H1 2009 PCR Not Detected     Chlamydophila pneumoniae PCR Not Detected     Mycoplasma pneumo by PCR Not Detected     Influenza A PCR Not Detected     Influenza A H3 Not Detected     Influenza A H1 Not Detected     RSV, PCR Not Detected     Bordetella parapertussis PCR Not Detected    Narrative:      The coronavirus on the RVP is NOT COVID-19 and is NOT indicative of infection with COVID-19.           Xr Chest 2 View    Result Date: 9/10/2020  Impression: Bibasilar discoid atelectasis or scarring. The chest appears otherwise clear.  Electronically Signed By-Evert Krishnan On:9/10/2020 1:27 PM This report was finalized on [id number] by  Velvet Oneil    Xr Chest 1 View    Result Date: 9/21/2020  Impression: 1. Slight improvement of the bilateral airspace opacities likely representing pneumonia.  Electronically Signed By-Flaquito Lewis On:9/21/2020 11:11 AM This report was finalized on [id number] by  Velvet Echevarria    Xr Chest Ap    Result Date: 9/19/2020  Impression: Multifocal consolidation involving the right midlung and lung bases bilaterally consistent with multifocal pneumonia, worsened from 09/10/2020.  Electronically Signed By-Ramiro Villa On:9/19/2020 10:30 AM This report was finalized on [id number] by  Ramiro Villa .                             Test Results Pending at Discharge  Pending Labs     Order Current Status    BUN In process    Respiratory Culture - Sputum, Cough Preliminary result            Procedures Performed           Consults:   Consults     Date and Time Order Name Status Description    9/21/2020 1014 Inpatient Pulmonology Consult Completed             Discharge Details        Discharge Medications      New Medications      Instructions Start Date   cefdinir 300 MG capsule  Commonly known as: OMNICEF   300 mg, Oral, Every 12 Hours Scheduled      dexamethasone 6 MG tablet  Commonly known as: DECADRON   6 mg, Oral, Daily    Start Date: September 25, 2020     pantoprazole 40 MG EC tablet  Commonly known as: PROTONIX   40 mg, Oral, Daily      polyethylene glycol 17 g packet  Commonly known as: MIRALAX   17 g, Oral, Daily   Start Date: September 25, 2020        Continue These Medications      Instructions Start Date   albuterol (2.5 MG/3ML) 0.083% nebulizer solution  Commonly known as: PROVENTIL   2.5 mg, Nebulization, Every 6 Hours PRN      albuterol sulfate  (90 Base) MCG/ACT inhaler  Commonly known as: PROVENTIL HFA;VENTOLIN HFA;PROAIR HFA   2 puffs, Inhalation, Every 4 Hours PRN      brompheniramine-pseudoephedrine-DM 30-2-10 MG/5ML syrup   5 mL, Oral, 4 Times Daily PRN      loratadine 10 MG tablet  Commonly known as: CLARITIN   10 mg, Oral, Every Night at Bedtime      Trelegy Ellipta 100-62.5-25 MCG/INH aerosol powder   Generic drug: Fluticasone-Umeclidin-Vilant   1 puff, Inhalation, Daily         Stop These Medications    doxycycline 100 MG capsule  Commonly known as: VIBRAMYCIN            Allergies   Allergen Reactions   • Sulfa Antibiotics Anaphylaxis         Discharge Disposition:  Home or Self Care    Diet:  Hospital:  Diet Order   Procedures   • Diet Regular         Discharge Activity:   Activity Instructions     AS tolerated                 CODE STATUS:    Code Status and Medical Interventions:   Ordered at: 09/19/20 1358     Level Of Support Discussed With:    Patient     Code Status:    CPR     Medical Interventions (Level of Support Prior to Arrest):    Full         Follow-up Appointments  No future appointments.          Condition on Discharge:      Stable      This patient has been examined wearing appropriate Personal Protective Equipment and discussed with pulmonologist. 09/24/20      Electronically signed by Lenin Brooks MD, 09/24/20, 2:05 PM EDT.      Time: I spent  40  minutes on this discharge activity which included face-to-face encounter with the patient/reviewing the data in the system/coordination of the  care with the nursing staff as well as consultants/documentation/entering orders.      Electronically signed by Lenin Brooks MD at 09/24/20 4091

## 2020-09-25 NOTE — PROGRESS NOTES
sepsis present on admission and secondary to covid 19 multifocal pneumoniaEnter Query Response Below      Query Response:              If applicable, please update the problem list.         Patient: Monisha Wilder        : 1955  Account: [id number]           Admit Date:          Options to Respond to Query:    1. Access the Encounter     a. From the To-Do Side bar, click Respond With New Note.     b. Answer query within the yellow box.                c. Update the Problem List if applicable.     ,     RISK FACTORS-65 year old female with history of COPD with oxygen usage at night.    CLINICAL INDICATORS-Presented with Covid 19 and has been found to have multifocal pneumonia, acute hypoxic respiratory failure.    Vitals on day of admission included temp. 101.9, , O2 sat. 86% on room air in  ED.  Labs also included lactate 1.0, proCT 0.55, CRP 11.4 and .     TREATMENTS-Intravenous antibiotics, titration of oxygen per NRB mask, Remdesivir and convalescent plasma, Decadron.    Please clarify if patient is being monitored/treated for any of the following:    *sepsis present on admission and secondary to covid 19 multifocal pneumonia  *covid 19 multifocal pneumonia only  *other________  *clinically undetermined    By submitting this query, we are merely seeking further clarification of documentation to accurately reflect all conditions that you are monitoring, evaluating, treating or that extend the hospitalization or utilize additional resources of care. Please utilize your independent clinical judgment when addressing the question(s) above.     This query and your response, once completed, will be entered into the legal medical record.    Sincerely,  Kingsley Verde RN, BSN  Clinical Documentation Integrity Program   [email or web address]

## 2020-09-25 NOTE — OUTREACH NOTE
COPD/PN Week 1 Survey      Responses   [facility] patient discharged from?  [facility]   COVID-19 Test Status  Not tested [tested positive at [facility] 9/11 per note]   Does the patient have one of the following disease processes/diagnoses(primary or secondary)?  COPD/Pneumonia   Is there a successful TCM telephone encounter documented?  No   Was the primary reason for admission:  Pneumonia   Week 1 attempt successful?  Yes   Call start time  1535   Call end time  1545   Discharge diagnosis  PNA d/t COVID-19 (positive on 9/11) , hypoxia,  Hx COPD   Meds reviewed with patient/caregiver?  Yes   Is the patient having any side effects they believe may be caused by any medication additions or changes?  No   Does the patient have all medications ordered at discharge?  Yes   Is the patient taking all medications as directed (includes completed medication regime)?  Yes   Does the patient have a primary care provider?   Yes   Does the patient have an appointment with their PCP or pulmonologist within 7 days of discharge?  No   What is preventing the patient from scheduling follow up appointments within 7 days of discharge?  Haven't had time   Has the patient kept scheduled appointments due by today?  N/A   What DME was ordered?  continuous O2 ordered from [employer]   Has all DME been delivered?  Yes   Pulse Ox monitoring  Intermittent   Pulse Ox device source  Patient   O2 Sat comments  91%@2L   O2 Sat: education provided  Sat levels   Psychosocial issues?  No   Comments  Pt c/o SOA w/ exertion, cough is improving but still prsent. Body aching is resolved, denies fever. Appetite is returning. Pt does get SOA with conversation.   Did the patient receive a copy of their discharge instructions?  Yes   Nursing interventions  Reviewed instructions with patient   What is the patient's perception of their health status since discharge?  Improving   Nursing Interventions  Nurse provided patient education   Is the patient/caregiver able to  teach back the hierarchy of who to call/visit for symptoms/problems? PCP, Specialist, Home health nurse, Urgent Care, ED, 911  Yes   Additional teach back comments  reviewed covid protocol   Is the patient/caregiver able to teach back signs and symptoms of worsening condition:  Fever/chills, Shortness of breath, Chest pain   Is the patient/caregiver able to teach back importance of completing antibiotic course of treatment?  Yes   Week 1 call completed?  Yes          Kyung Conley RN

## 2020-09-25 NOTE — OUTREACH NOTE
Prep Survey      Responses   [facility]   Is LACE score < 7 ?  No   Eligibility  Readm Mgmt   Discharge diagnosis  PNA d/t COVID-19 (positive on 9/11) , hypoxia,  Hx COPD   COVID-19 Test Status  Not tested   Does the patient have one of the following disease processes/diagnoses(primary or secondary)?  COPD/Pneumonia   Does the patient have Home health ordered?  No   Is there a DME ordered?  Yes   What DME was ordered?  continuous O2 ordered from [employer]   Prep survey completed?  Yes          Natasha Salinas RN

## 2020-09-26 ENCOUNTER — READMISSION MANAGEMENT (OUTPATIENT)
Dept: CALL CENTER | Facility: HOSPITAL | Age: 65
End: 2020-09-26

## 2020-09-26 NOTE — OUTREACH NOTE
COPD/PN Week 1 Survey      Responses   [facility] patient discharged from?  [facility]   COVID-19 Test Status  Not tested   Does the patient have one of the following disease processes/diagnoses(primary or secondary)?  COPD/Pneumonia   Is there a successful TCM telephone encounter documented?  No   Was the primary reason for admission:  Pneumonia   Week 1 attempt successful?  Yes   Call start time  1413   Call end time  1416   Discharge diagnosis  PNA d/t COVID-19 (positive on 9/11) , hypoxia,  Hx COPD   Meds reviewed with patient/caregiver?  Yes   Is the patient having any side effects they believe may be caused by any medication additions or changes?  No   Does the patient have all medications ordered at discharge?  Yes   Is the patient taking all medications as directed (includes completed medication regime)?  Yes   Does the patient have a primary care provider?   Yes   Does the patient have an appointment with their PCP or pulmonologist within 7 days of discharge?  No   What is preventing the patient from scheduling follow up appointments within 7 days of discharge?  Haven't had time   Nursing Interventions  Educated patient on importance of making appointment, Advised patient to make appointment   Has the patient kept scheduled appointments due by today?  N/A   Has home health visited the patient within 72 hours of discharge?  N/A   What DME was ordered?  continuous O2 ordered from [employer]   Has all DME been delivered?  Yes   Pulse Ox monitoring  Intermittent   Pulse Ox device source  Patient   O2 Sat comments  pt reports oxygen saturations running 90-91%   O2 Sat: education provided  Sat levels, When to seek care   Psychosocial issues?  No   Did the patient receive a copy of their discharge instructions?  Yes   Nursing interventions  Reviewed instructions with patient   What is the patient's perception of their health status since discharge?  Improving   Nursing Interventions  Nurse provided patient education    Is the patient/caregiver able to teach back the hierarchy of who to call/visit for symptoms/problems? PCP, Specialist, Home health nurse, Urgent Care, ED, 911  Yes   Additional teach back comments  pt reports her baseline oxygen levels are 93%   Is the patient able to teach back COPD zones?  Yes   Nursing interventions  Education provided on various zones   Patient reports what zone on this call?  Yellow Zone   Yellow Zone  Unable to complete daily activities   Yellow interventions  Continue to use daily medications, Use quick relief inhaler as ordered, Use oxygen as ordered, Get plenty of rest   Is the patient/caregiver able to teach back signs and symptoms of worsening condition:  Fever/chills, Shortness of breath, Chest pain   Is the patient/caregiver able to teach back importance of completing antibiotic course of treatment?  Yes   Week 1 call completed?  Yes          Jah Duron RN

## 2020-09-27 ENCOUNTER — READMISSION MANAGEMENT (OUTPATIENT)
Dept: CALL CENTER | Facility: HOSPITAL | Age: 65
End: 2020-09-27

## 2020-09-27 NOTE — OUTREACH NOTE
COPD/PN Week 1 Survey      Responses   [facility] patient discharged from?  [facility]   COVID-19 Test Status  Not tested   Does the patient have one of the following disease processes/diagnoses(primary or secondary)?  COPD/Pneumonia   Is there a successful TCM telephone encounter documented?  No   Was the primary reason for admission:  Pneumonia   Week 1 attempt successful?  Yes   Call start time  1221   Call end time  1222   Discharge diagnosis  PNA d/t COVID-19 (positive on 9/11) , hypoxia,  Hx COPD   Does the patient have all medications ordered at discharge?  Yes   Is the patient taking all medications as directed (includes completed medication regime)?  Yes   Does the patient have a primary care provider?   Yes   Does the patient have an appointment with their PCP or pulmonologist within 7 days of discharge?  No   What is preventing the patient from scheduling follow up appointments within 7 days of discharge?  Haven't had time   Nursing Interventions  Educated patient on importance of making appointment, Advised patient to make appointment   Has the patient kept scheduled appointments due by today?  N/A   Has home health visited the patient within 72 hours of discharge?  N/A   What DME was ordered?  continuous O2 ordered from [employer]   Has all DME been delivered?  Yes   Pulse Ox monitoring  Intermittent   Pulse Ox device source  Patient   O2 Sat comments  pt reports oxygen saturations running 94%   O2 Sat: education provided  Sat levels, When to seek care   Psychosocial issues?  No   Did the patient receive a copy of their discharge instructions?  Yes   Nursing interventions  Reviewed instructions with patient   Nursing Interventions  Nurse provided patient education   Is the patient/caregiver able to teach back the hierarchy of who to call/visit for symptoms/problems? PCP, Specialist, Home health nurse, Urgent Care, ED, 911  Yes   Additional teach back comments  pt reports her baseline oxygen levels are 93%    Is the patient/caregiver able to teach back signs and symptoms of worsening condition:  Fever/chills, Shortness of breath, Chest pain   Is the patient/caregiver able to teach back importance of completing antibiotic course of treatment?  Yes   Week 1 call completed?  Yes          Jah Duron RN

## 2020-09-30 ENCOUNTER — READMISSION MANAGEMENT (OUTPATIENT)
Dept: CALL CENTER | Facility: HOSPITAL | Age: 65
End: 2020-09-30

## 2020-09-30 NOTE — OUTREACH NOTE
COPD/PN Week 1 Survey      Responses   [facility] patient discharged from?  [facility]   Does the patient have one of the following disease processes/diagnoses(primary or secondary)?  COPD/Pneumonia   Is there a successful TCM telephone encounter documented?  No   Was the primary reason for admission:  Pneumonia   Week 1 attempt successful?  Yes   Call start time  1322   Call end time  1323   Discharge diagnosis  PNA d/t COVID-19 (positive on 9/11) , hypoxia,  Hx COPD   Meds reviewed with patient/caregiver?  Yes   Is the patient having any side effects they believe may be caused by any medication additions or changes?  No   Does the patient have all medications ordered at discharge?  Yes   Is the patient taking all medications as directed (includes completed medication regime)?  Yes   Does the patient have a primary care provider?   Yes   Has the patient kept scheduled appointments due by today?  Yes   Pulse Ox monitoring  Intermittent   Pulse Ox device source  Patient   Psychosocial issues?  No   Did the patient receive a copy of their discharge instructions?  Yes   Nursing interventions  Reviewed instructions with patient   What is the patient's perception of their health status since discharge?  Improving   Nursing Interventions  Nurse provided patient education   Is the patient/caregiver able to teach back the hierarchy of who to call/visit for symptoms/problems? PCP, Specialist, Home health nurse, Urgent Care, ED, 911  Yes   Is the patient/caregiver able to teach back signs and symptoms of worsening condition:  Fever/chills, Shortness of breath, Chest pain   Is the patient/caregiver able to teach back importance of completing antibiotic course of treatment?  Yes   Week 1 call completed?  Yes          Chichi Mena RN

## 2020-10-03 ENCOUNTER — READMISSION MANAGEMENT (OUTPATIENT)
Dept: CALL CENTER | Facility: HOSPITAL | Age: 65
End: 2020-10-03

## 2020-10-03 NOTE — OUTREACH NOTE
COVID-19 Week 2 Survey      Responses   [facility] patient discharged from?  [facility]   Does the patient have one of the following disease processes/diagnoses(primary or secondary)?  COPD/Pneumonia   Call Number  Call 1   COVID-19 Week 2: Call 1 attempt successful?  Yes   Call start time  1502   Call end time  1507   Meds reviewed with patient/caregiver?  Yes   Is the patient having any side effects they believe may be caused by any medication additions or changes?  No   Does the patient have all medications ordered at discharge?  Yes   Is the patient taking all medications as directed (includes completed medication regime)?  Yes   Does the patient have a primary care provider?   Yes   Has the patient kept scheduled appointments due by today?  Yes   Has home health visited the patient within 72 hours of discharge?  N/A   What DME was ordered?  using home oxygen as needed now   Has all DME been delivered?  Yes   Psychosocial issues?  No   Did the patient receive a copy of their discharge instructions?  Yes   Did the patient receive a copy of COVID-19 specific instructions?  Yes   Nursing interventions  Reviewed instructions with patient   What is the patient's perception of their health status since discharge?  Improving   Does the patient have any of the following symptoms?  Cough   Nursing Interventions  Nurse provided patient education   Pulse Ox monitoring  Intermittent   Pulse Ox device source  Patient   O2 Sat comments  reports O2 sats 95% on 2L home oxygen   O2 Sat education comments  States aware to seek care if O2 sats remain below 92%.   Is the patient/caregiver able to teach back steps to recovery at home?  Set small, achievable goals for return to baseline health, Eat a well-balance diet, Rest and rebuild strength, gradually increase activity   Is the patient/caregiver able to teach back the hierarchy of who to call/visit for symptoms/problems? PCP, Specialist, Home health nurse, Urgent Care, ED, 911   Yes   COVID-19 call completed?  Yes   Wrap up additional comments  States is improving-denies any SOA while on home oxygen. States occasional productive cough in the morning. States appetite improved.Denies any needs today.          Jo Dixon RN

## 2020-10-06 ENCOUNTER — READMISSION MANAGEMENT (OUTPATIENT)
Dept: CALL CENTER | Facility: HOSPITAL | Age: 65
End: 2020-10-06

## 2020-10-06 NOTE — OUTREACH NOTE
COVID-19 Week 2 Survey      Responses   [facility] [place] patient discharged from?  [facility]   Does the patient have one of the following disease processes/diagnoses(primary or secondary)?  COPD/Pneumonia   Call Number  Call 2   COVID-19 Week 2: Call 1 attempt successful?  Yes   Call start time  1702   Call end time  1704   Discharge diagnosis  PNA d/t COVID-19 (positive on 9/11) , hypoxia,  Hx COPD   Is patient permission given to speak with other caregiver?  Yes   Meds reviewed with patient/caregiver?  Yes   Is the patient having any side effects they believe may be caused by any medication additions or changes?  No   Does the patient have all medications ordered at discharge?  Yes   Is the patient taking all medications as directed (includes completed medication regime)?  Yes   Does the patient have a primary care provider?   Yes   Does the patient have an appointment with their PCP or specialist within 7 days of discharge?  Yes   Has the patient kept scheduled appointments due by today?  Yes   Has home health visited the patient within 72 hours of discharge?  N/A   Has all DME been delivered?  Yes   Psychosocial issues?  No   Comments  O2 still as needed.   Did the patient receive a copy of their discharge instructions?  Yes   Did the patient receive a copy of COVID-19 specific instructions?  Yes   Nursing interventions  Reviewed instructions with patient   What is the patient's perception of their health status since discharge?  Improving   Does the patient have any of the following symptoms?  Cough   Nursing Interventions  Nurse provided patient education   Pulse Ox monitoring  Intermittent   Pulse Ox device source  Patient   Is the patient/caregiver able to teach back steps to recovery at home?  Set small, achievable goals for return to baseline health, Eat a well-balance diet, Make a list of questions for provider's appointment, Weigh daily, Rest and rebuild strength, gradually increase activity, Practice good  oral hygiene   Is the patient/caregiver able to teach back the hierarchy of who to call/visit for symptoms/problems? PCP, Specialist, Home health nurse, Urgent Care, ED, 911  Yes   COVID-19 call completed?  Yes   Wrap up additional comments  She is improving, cough only in the morning.           Sharon Sierra RN

## 2020-10-09 ENCOUNTER — HOSPITAL ENCOUNTER (EMERGENCY)
Facility: HOSPITAL | Age: 65
Discharge: HOME | End: 2020-10-09
Admitting: EMERGENCY MEDICINE

## 2020-10-09 VITALS
BODY MASS INDEX: 37.6 KG/M2 | DIASTOLIC BLOOD PRESSURE: 75 MMHG | RESPIRATION RATE: 16 BRPM | OXYGEN SATURATION: 96 % | TEMPERATURE: 98.4 F | WEIGHT: 220.24 LBS | HEIGHT: 64 IN | HEART RATE: 100 BPM | SYSTOLIC BLOOD PRESSURE: 111 MMHG

## 2020-10-09 DIAGNOSIS — M54.32 SCIATICA OF LEFT SIDE: Primary | ICD-10-CM

## 2020-10-09 PROCEDURE — 25010000002 KETOROLAC TROMETHAMINE PER 15 MG: Performed by: PHYSICIAN ASSISTANT

## 2020-10-09 PROCEDURE — 96374 THER/PROPH/DIAG INJ IV PUSH: CPT

## 2020-10-09 PROCEDURE — 99283 EMERGENCY DEPT VISIT LOW MDM: CPT

## 2020-10-09 PROCEDURE — 25010000002 METHYLPREDNISOLONE PER 125 MG: Performed by: PHYSICIAN ASSISTANT

## 2020-10-09 PROCEDURE — 96375 TX/PRO/DX INJ NEW DRUG ADDON: CPT

## 2020-10-09 RX ORDER — KETOROLAC TROMETHAMINE 15 MG/ML
15 INJECTION, SOLUTION INTRAMUSCULAR; INTRAVENOUS ONCE
Status: COMPLETED | OUTPATIENT
Start: 2020-10-09 | End: 2020-10-09

## 2020-10-09 RX ORDER — METHYLPREDNISOLONE SODIUM SUCCINATE 125 MG/2ML
125 INJECTION, POWDER, LYOPHILIZED, FOR SOLUTION INTRAMUSCULAR; INTRAVENOUS ONCE
Status: COMPLETED | OUTPATIENT
Start: 2020-10-09 | End: 2020-10-09

## 2020-10-09 RX ORDER — METHYLPREDNISOLONE 4 MG/1
TABLET ORAL
Qty: 21 TABLET | Refills: 0 | Status: SHIPPED | OUTPATIENT
Start: 2020-10-09

## 2020-10-09 RX ORDER — SODIUM CHLORIDE 0.9 % (FLUSH) 0.9 %
10 SYRINGE (ML) INJECTION AS NEEDED
Status: DISCONTINUED | OUTPATIENT
Start: 2020-10-09 | End: 2020-10-09 | Stop reason: HOSPADM

## 2020-10-09 RX ORDER — METHOCARBAMOL 500 MG/1
500 TABLET, FILM COATED ORAL ONCE
Status: COMPLETED | OUTPATIENT
Start: 2020-10-09 | End: 2020-10-09

## 2020-10-09 RX ORDER — LIDOCAINE 50 MG/G
1 PATCH TOPICAL ONCE
Status: DISCONTINUED | OUTPATIENT
Start: 2020-10-09 | End: 2020-10-09 | Stop reason: HOSPADM

## 2020-10-09 RX ORDER — METHOCARBAMOL 750 MG/1
750 TABLET, FILM COATED ORAL 3 TIMES DAILY
Qty: 45 TABLET | Refills: 0 | Status: SHIPPED | OUTPATIENT
Start: 2020-10-09

## 2020-10-09 RX ADMIN — LIDOCAINE 1 PATCH: 50 PATCH TOPICAL at 13:03

## 2020-10-09 RX ADMIN — METHOCARBAMOL 500 MG: 500 TABLET, FILM COATED ORAL at 13:02

## 2020-10-09 RX ADMIN — KETOROLAC TROMETHAMINE 15 MG: 15 INJECTION, SOLUTION INTRAMUSCULAR; INTRAVENOUS at 13:02

## 2020-10-09 RX ADMIN — METHYLPREDNISOLONE SODIUM SUCCINATE 125 MG: 125 INJECTION, POWDER, FOR SOLUTION INTRAMUSCULAR; INTRAVENOUS at 13:02

## 2020-10-09 NOTE — ED NOTES
Ok to not isolate per Johana Noyola. Positive Covid on 9/11/2020. No symptoms        Shelly Caldwell, RN  10/09/20 6209

## 2020-10-09 NOTE — ED PROVIDER NOTES
Subjective   History:  Patient is a 65-year-old female presents to the ER with low back pain left hip pain radiating down her left leg.  She woke up with this.  No history of sciatica.  Denies any recent travel.  She has had a recent diagnosis of COVID approximately a month ago she wears oxygen secondary to this but she did is well beforehand for COPD and DONG.  She denies any significant shortness of breath that is new chest pain nausea vomiting she does not feel sick and feels much better from her recent COVID diagnosis she was diagnosed in the first week of September.  Denies any significant calf tenderness or leg redness or swelling.    Onset: 1 day  Location: Low back and left hip  Duration: Constant  Character: Sharp pain  Aggravating/Alleviating factors: Seems somewhat better if she bends her leg and leans over  Radiation left leg  Severity: Moderate            Review of Systems   Constitutional: Negative for diaphoresis, fatigue and fever.   HENT: Negative.    Respiratory: Negative for cough, choking and shortness of breath.    Cardiovascular: Negative for chest pain and palpitations.   Gastrointestinal: Negative for abdominal distention, abdominal pain, nausea and vomiting.   Genitourinary: Negative.  Negative for difficulty urinating.   Musculoskeletal: Positive for back pain.        Left leg pain   Neurological: Negative.    Psychiatric/Behavioral: Negative.        Past Medical History:   Diagnosis Date   • COPD (chronic obstructive pulmonary disease) (CMS/[facility])    • Sleep apnea        Allergies   Allergen Reactions   • Sulfa Antibiotics Anaphylaxis       Past Surgical History:   Procedure Laterality Date   • TONSILLECTOMY     • TUBAL ABDOMINAL LIGATION         No family history on file.    Social History     Socioeconomic History   • Marital status:      Spouse name: Not on file   • Number of children: Not on file   • Years of education: Not on file   • Highest education level: Not on file   Tobacco  Use   • Smoking status: Former Smoker   • Smokeless tobacco: Never Used   • Tobacco comment: quit 2011   Substance and Sexual Activity   • Alcohol use: Never     Frequency: Never   • Drug use: Never           Objective   Physical Exam  Vitals signs and nursing note reviewed.   Constitutional:       Appearance: She is well-developed.   HENT:      Head: Normocephalic and atraumatic.   Eyes:      Pupils: Pupils are equal, round, and reactive to light.   Neck:      Musculoskeletal: Normal range of motion.   Cardiovascular:      Rate and Rhythm: Normal rate and regular rhythm.   Pulmonary:      Effort: Pulmonary effort is normal.      Breath sounds: Normal breath sounds.   Musculoskeletal: Normal range of motion.      Comments: Pain with palpation over low back on left side and left upper buttock and left external hip. Increase in pain with flexion/extension of left leg. No edema, erythema, no calf tenderness of LLE. Pulses 2 in LE   Skin:     General: Skin is warm and dry.   Neurological:      Mental Status: She is alert and oriented to person, place, and time.   Psychiatric:         Mood and Affect: Mood normal.         Behavior: Behavior normal.         Thought Content: Thought content normal.         Judgment: Judgment normal.         Procedures           ED Course      Medications   sodium chloride 0.9 % flush 10 mL (has no administration in time range)   lidocaine (LIDODERM) 5 % 1 patch (1 patch Transdermal Medication Applied 10/9/20 1303)   methylPREDNISolone sodium succinate (SOLU-Medrol) injection 125 mg (125 mg Intravenous Given 10/9/20 1302)   ketorolac (TORADOL) injection 15 mg (15 mg Intravenous Given 10/9/20 1302)   methocarbamol (ROBAXIN) tablet 500 mg (500 mg Oral Given 10/9/20 1302)     Labs Reviewed - No data to display  No orders to display                                          MDM  Number of Diagnoses or Management Options  Sciatica of left side:   Diagnosis management comments: I examined the  patient using the appropriate personal protective equipment.      DISPOSITION:   Chart Review:  Comorbidity:  has a past medical history of COPD (chronic obstructive pulmonary disease) (CMS/[facility]) and Sleep apnea.    Imaging: Was interpreted by physician and reviewed by myself:  No radiology results for the last day    Disposition/Treatment:    Patient is a 65-year-old female presents to the ER with left lower back and hip pain radiating down her leg.  She was given Toradol Robaxin and Solu-Medrol with complete resolution of pain.  Patient does have previous diagnosis of COVID reports she is feeling significantly improved.  Patient has no edema erythema calf tenderness noted.  At this time she was treated for sciatica although she was given return precautions culture comes for any DVT signs.  I spoke with Dr. Ramos who agrees with this plan.  She was discharged home in stable condition.  Return precaution follow instruction provided      Final diagnoses:   Sciatica of left side            Johana Noyola PA-C  10/09/20 1453

## 2020-10-13 ENCOUNTER — READMISSION MANAGEMENT (OUTPATIENT)
Dept: CALL CENTER | Facility: HOSPITAL | Age: 65
End: 2020-10-13

## 2020-10-13 NOTE — OUTREACH NOTE
COVID-19 Week 3 Survey      Responses   [facility] patient discharged from?  [facility]   Does the patient have one of the following disease processes/diagnoses(primary or secondary)?  COPD/Pneumonia   Call Number  Call 1   COVID-19 Week 3: Call 1 attempt successful?  Yes   Call start time  1240   Call end time  1247   Discharge diagnosis  PNA d/t COVID-19 (positive on 9/11) , hypoxia,  Hx COPD   Is the patient taking all medications as directed (includes completed medication regime)?  Yes   Does the patient have a primary care provider?   Yes   Does the patient have an appointment with their PCP or specialist within 7 days of discharge?  Yes   Has the patient kept scheduled appointments due by today?  Yes   Has home health visited the patient within 72 hours of discharge?  N/A   What DME was ordered?  using home oxygen as needed now   Has all DME been delivered?  Yes   Comments  States she has not use oxygen today   Did the patient receive a copy of COVID-19 specific instructions?  Yes   Nursing interventions  Reviewed instructions with patient   What is the patient's perception of their health status since discharge?  Improving   Does the patient have any of the following symptoms?  Cough [slight productive cough]   Pulse Ox monitoring  Intermittent   Pulse Ox device source  Patient   O2 Sat comments  Sats have been 92-92% without oxygen.     O2 Sat: education provided  Sat levels, Monitoring frequency, When to seek care   Is the patient/caregiver able to teach back steps to recovery at home?  Set small, achievable goals for return to baseline health, Rest and rebuild strength, gradually increase activity   Is the patient/caregiver able to teach back the hierarchy of who to call/visit for symptoms/problems? PCP, Specialist, Home health nurse, Urgent Care, ED, 911  Yes   COVID-19 call completed?  Yes   Wrap up additional comments  She was in the ER but it was for sciatic pain not Covid19 related.  They prescribed  steroids and no further pain          Nohemi Campo, LPN

## 2020-10-20 ENCOUNTER — READMISSION MANAGEMENT (OUTPATIENT)
Dept: CALL CENTER | Facility: HOSPITAL | Age: 65
End: 2020-10-20

## 2020-10-20 NOTE — OUTREACH NOTE
COVID-19 Week 4 Survey      Responses   [facility] patient discharged from?  [facility]   Does the patient have one of the following disease processes/diagnoses(primary or secondary)?  COPD/Pneumonia   Call Number  Call 1   COVID-19 Week 4: Call 1 attempt successful?  Yes   Call start time  1156   Call end time  1159   Discharge diagnosis  PNA d/t COVID-19 (positive on 9/11) , hypoxia,  Hx COPD   Meds reviewed with patient/caregiver?  Yes   Is the patient having any side effects they believe may be caused by any medication additions or changes?  No   Does the patient have all medications ordered at discharge?  Yes   Is the patient taking all medications as directed (includes completed medication regime)?  Yes   Has the patient kept scheduled appointments due by today?  Yes   Is the patient still receiving Home Health Services?  Yes   What is the patient's perception of their health status since discharge?  Improving   Does the patient have any of the following symptoms?  None   Nursing Interventions  Nurse provided patient education   Pulse Ox monitoring  Intermittent   Pulse Ox device source  Patient   O2 Sat education comments  States aware to seek care if O2 sats remain below 92%.   Is the patient/caregiver able to teach back steps to recovery at home?  Set small, achievable goals for return to baseline health, Rest and rebuild strength, gradually increase activity, Make a list of questions for provider's appointment   Is the patient/caregiver able to teach back the hierarchy of who to call/visit for symptoms/problems? PCP, Specialist, Home health nurse, Urgent Care, ED, 911  Yes   Did the patient feel the follow up calls were helpful during their recovery period?  Yes   Was the number of calls appropriate?  Yes   Interested in COVID-19 Plasma Donation?  Uncertain          Tish Meyer LPN

## 2024-09-13 ENCOUNTER — HOSPITAL ENCOUNTER (OUTPATIENT)
Facility: HOSPITAL | Age: 69
Discharge: HOME | End: 2024-09-13
Attending: EMERGENCY MEDICINE
Payer: MEDICARE

## 2024-09-13 ENCOUNTER — APPOINTMENT (OUTPATIENT)
Dept: GENERAL RADIOLOGY | Facility: HOSPITAL | Age: 69
End: 2024-09-13
Payer: MEDICARE

## 2024-09-13 VITALS
DIASTOLIC BLOOD PRESSURE: 73 MMHG | TEMPERATURE: 98 F | HEART RATE: 70 BPM | BODY MASS INDEX: 36.38 KG/M2 | HEIGHT: 63 IN | RESPIRATION RATE: 22 BRPM | SYSTOLIC BLOOD PRESSURE: 137 MMHG | OXYGEN SATURATION: 95 % | WEIGHT: 205.3 LBS

## 2024-09-13 DIAGNOSIS — J44.9 CHRONIC OBSTRUCTIVE PULMONARY DISEASE, UNSPECIFIED COPD TYPE: ICD-10-CM

## 2024-09-13 DIAGNOSIS — B34.9 VIRAL SYNDROME: Primary | ICD-10-CM

## 2024-09-13 LAB
B PARAPERT DNA SPEC QL NAA+PROBE: NOT DETECTED
B PERT DNA SPEC QL NAA+PROBE: NOT DETECTED
C PNEUM DNA NPH QL NAA+NON-PROBE: NOT DETECTED
FLUAV SUBTYP SPEC NAA+PROBE: NOT DETECTED
FLUAV SUBTYP SPEC NAA+PROBE: NOT DETECTED
FLUBV RNA ISLT QL NAA+PROBE: NOT DETECTED
FLUBV RNA ISLT QL NAA+PROBE: NOT DETECTED
HADV DNA SPEC NAA+PROBE: NOT DETECTED
HCOV 229E RNA SPEC QL NAA+PROBE: NOT DETECTED
HCOV HKU1 RNA SPEC QL NAA+PROBE: NOT DETECTED
HCOV NL63 RNA SPEC QL NAA+PROBE: NOT DETECTED
HCOV OC43 RNA SPEC QL NAA+PROBE: NOT DETECTED
HMPV RNA NPH QL NAA+NON-PROBE: NOT DETECTED
HPIV1 RNA ISLT QL NAA+PROBE: NOT DETECTED
HPIV2 RNA SPEC QL NAA+PROBE: NOT DETECTED
HPIV3 RNA NPH QL NAA+PROBE: NOT DETECTED
HPIV4 P GENE NPH QL NAA+PROBE: DETECTED
M PNEUMO IGG SER IA-ACNC: NOT DETECTED
RHINOVIRUS RNA SPEC NAA+PROBE: NOT DETECTED
RSV RNA NPH QL NAA+NON-PROBE: NOT DETECTED
RSV RNA NPH QL NAA+NON-PROBE: NOT DETECTED
SARS-COV-2 RNA NPH QL NAA+NON-PROBE: NOT DETECTED
SARS-COV-2 RNA RESP QL NAA+PROBE: NOT DETECTED

## 2024-09-13 PROCEDURE — 87637 SARSCOV2&INF A&B&RSV AMP PRB: CPT

## 2024-09-13 PROCEDURE — 0202U NFCT DS 22 TRGT SARS-COV-2: CPT

## 2024-09-13 PROCEDURE — 99204 OFFICE O/P NEW MOD 45 MIN: CPT

## 2024-09-13 PROCEDURE — G0463 HOSPITAL OUTPT CLINIC VISIT: HCPCS

## 2024-09-13 PROCEDURE — 71046 X-RAY EXAM CHEST 2 VIEWS: CPT

## 2024-09-13 RX ORDER — DEXTROMETHORPHAN HYDROBROMIDE AND PROMETHAZINE HYDROCHLORIDE 15; 6.25 MG/5ML; MG/5ML
5 SYRUP ORAL 4 TIMES DAILY PRN
Qty: 118 ML | Refills: 0 | Status: SHIPPED | OUTPATIENT
Start: 2024-09-13

## 2024-09-13 RX ORDER — METHYLPREDNISOLONE 4 MG
TABLET, DOSE PACK ORAL
Qty: 21 TABLET | Refills: 0 | Status: SHIPPED | OUTPATIENT
Start: 2024-09-13

## 2024-09-13 NOTE — FSED PROVIDER NOTE
Subjective   History of Present Illness  Chief Complaint: Cough      HPI: Patient is a 69-year-old female who presents by private vehicle has a known history of COPD wears 2 L nasal cannula at all times she states for approximately 1 week she has had a persistent productive cough she is had some associated congestion.  She has family members that have similar symptoms.  She denies acute chest pain or shortness of breath.  She has had no lower extremity swelling, denies fevers nausea or vomiting.    PCP: O'mahendra    History provided by:  Patient      Review of Systems  See above as noted    Past Medical History:   Diagnosis Date   • COPD (chronic obstructive pulmonary disease)    • Sleep apnea        Allergies   Allergen Reactions   • Sulfa Antibiotics Anaphylaxis       Past Surgical History:   Procedure Laterality Date   • TONSILLECTOMY     • TUBAL ABDOMINAL LIGATION         History reviewed. No pertinent family history.    Social History     Socioeconomic History   • Marital status:    Tobacco Use   • Smoking status: Former   • Smokeless tobacco: Never   • Tobacco comments:     quit 2011   Vaping Use   • Vaping status: Never Used   Substance and Sexual Activity   • Alcohol use: Never   • Drug use: Never           Objective   Physical Exam  Vitals reviewed.   Constitutional:       Appearance: She is obese. She is not toxic-appearing.   HENT:      Head: Normocephalic.   Eyes:      Pupils: Pupils are equal, round, and reactive to light.   Cardiovascular:      Rate and Rhythm: Normal rate and regular rhythm.      Pulses: Normal pulses.      Heart sounds: Normal heart sounds. No murmur heard.  Pulmonary:      Effort: Tachypnea present. No accessory muscle usage or respiratory distress.      Breath sounds: No stridor. No wheezing.   Skin:     General: Skin is warm and dry.   Neurological:      General: No focal deficit present.      Mental Status: She is alert and oriented to person, place, and time.  "        Procedures           ED Course      /73 (BP Location: Right arm, Patient Position: Sitting)   Pulse 98   Temp 98 °F (36.7 °C) (Temporal)   Resp 22   Ht 160 cm (63\")   Wt 93.1 kg (205 lb 4.8 oz)   SpO2 90%   BMI 36.37 kg/m²   Labs Reviewed   COVID-19 AND FLU A/B, NP SWAB IN TRANSPORT MEDIA 1 HR TAT - Normal    Narrative:     Fact sheet for providers: https://www.fda.gov/media/204457/download    Fact sheet for patients: https://www.fda.gov/media/121227/download    Test performed by PCR.   RSV PCR - Normal   RESPIRATORY PANEL PCR W/ COVID-19 (SARS-COV-2), NP SWAB IN UTM/VTP, 2 HR TAT     Medications - No data to display  XR Chest 2 View    Result Date: 9/13/2024  Impression: Chronic findings in the lung bases. No acute chest findings. Electronically Signed: Genevieve Mitchell MD  9/13/2024 12:01 PM EDT  Workstation ID: [id number]                                        Medical Decision Making  Patient presented with above complaints, noted physical exam was completed she was placed on a cardiac monitor she was on 2 L nasal cannula with O2 sats at 95% this reports that this is her normal.  She has family members in the home that are sick with other viral illnesses.  She denies systemic infectious complaints, she has had no acute shortness of breath or chest pain.  A chest x-ray was obtained and was negative for acute abnormality on physical exam she has no wheezing or crackles.  COVID and influenza were negative as well as RSV, respiratory panel was sent and pending at time of discharge.  Shared decision-making with the patient she would like to be prescribed cough medicine as well as steroids, plans to follow-up with her pulmonologist as well as her PCP.  Discussed worrisome symptoms to monitor for advised to return as needed.  Patient gave verbal understanding and felt comfortable this plan of care.  Denies further questions or complaints.    /73 (BP Location: Right arm, Patient Position: Sitting)  " " Pulse 98   Temp 98 °F (36.7 °C) (Temporal)   Resp 22   Ht 160 cm (63\")   Wt 93.1 kg (205 lb 4.8 oz)   SpO2 90%   BMI 36.37 kg/m²      Chart review: 6/3/2024 outpatient visit with PCP related to medical renal's exam    Past Medical History:  No date: COPD (chronic obstructive pulmonary disease)  No date: Sleep apnea     Medications: Medications - No data to display       Radiology interpretation:  X-rays reviewed by me and interpreted by radiologist,   XR Chest 2 View    Result Date: 9/13/2024  Impression: Chronic findings in the lung bases. No acute chest findings. Electronically Signed: Genevieve Mitchell MD  9/13/2024 12:01 PM EDT  Workstation ID: [id number]       Lab interpretation:  Labs viewed by me significant for, Labs Reviewed  COVID-19 AND FLU A/B, NP SWAB IN TRANSPORT MEDIA 1 HR TAT - Normal       Narrative: Fact sheet for providers: https://www.fda.gov/media/676407/download                                Fact sheet for patients: https://www.fda.gov/media/684442/download                                Test performed by PCR.  RSV PCR - Normal  RESPIRATORY PANEL PCR W/ COVID-19 (SARS-COV-2), NP SWAB IN UTM/VTP, 2 HR TAT      Note Disclaimer: At [facility], we believe that sharing information builds trust and better  relationships. You are receiving this note because you recently visited [facility]. It is possible you will see health information before a provider has talked with you about it. This kind of information can be easy to misunderstand. To help you fully understand what it means for your health, we urge you to discuss this note with your provider.     Part of this note may be an electronic transcription/translation of spoken language to printed text using the Dragon Dictation System.    Appropriate PPE worn during exam.       Amount and/or Complexity of Data Reviewed  Labs: ordered.  Radiology: ordered.        Final diagnoses:   None       ED Disposition  ED Disposition       None      "       No follow-up provider specified.       Medication List      No changes were made to your prescriptions during this visit.

## 2024-09-13 NOTE — DISCHARGE INSTRUCTIONS
Prescription sent to your preferred pharmacy, continue using your inhalers and breathing treatments.    Follow-up with your PCP as well as your pulmonologist.    Return or go to the nearest emergency room if you begin to experience acute chest pain or shortness of breath.

## 2024-09-14 NOTE — FSED PROVIDER NOTE
History of Present Illness  Chief Complaint: Cough        HPI: Patient is a 69-year-old female who presents by private vehicle has a known history of COPD wears 2 L nasal cannula at all times she states for approximately 1 week she has had a persistent productive cough she is had some associated congestion.  She has family members that have similar symptoms.  She denies acute chest pain or shortness of breath.  She has had no lower extremity swelling, denies fevers nausea or vomiting.     PCP: O'mahendra     History provided by:  Patient        Review of Systems  See above as noted     Medical History        Past Medical History:   Diagnosis Date    COPD (chronic obstructive pulmonary disease)      Sleep apnea              Allergies        Allergies   Allergen Reactions    Sulfa Antibiotics Anaphylaxis            Surgical History         Past Surgical History:   Procedure Laterality Date    TONSILLECTOMY        TUBAL ABDOMINAL LIGATION                History reviewed. No pertinent family history.     Social History   Social History            Socioeconomic History    Marital status:    Tobacco Use    Smoking status: Former    Smokeless tobacco: Never    Tobacco comments:       quit 2011   Vaping Use    Vaping status: Never Used   Substance and Sexual Activity    Alcohol use: Never    Drug use: Never                        Objective  Physical Exam  Vitals reviewed.   Constitutional:       Appearance: She is obese. She is not toxic-appearing.   HENT:      Head: Normocephalic.   Eyes:      Pupils: Pupils are equal, round, and reactive to light.   Cardiovascular:      Rate and Rhythm: Normal rate and regular rhythm.      Pulses: Normal pulses.      Heart sounds: Normal heart sounds. No murmur heard.  Pulmonary:      Effort: Tachypnea present. No accessory muscle usage or respiratory distress.      Breath sounds: No stridor. No wheezing.   Skin:     General: Skin is warm and dry.   Neurological:      General: No focal  "deficit present.      Mental Status: She is alert and oriented to person, place, and time.            Procedures                 ED Course      /73 (BP Location: Right arm, Patient Position: Sitting)   Pulse 98   Temp 98 °F (36.7 °C) (Temporal)   Resp 22   Ht 160 cm (63\")   Wt 93.1 kg (205 lb 4.8 oz)   SpO2 90%   BMI 36.37 kg/m²       Labs Reviewed   COVID-19 AND FLU A/B, NP SWAB IN TRANSPORT MEDIA 1 HR TAT - Normal     Narrative:      Fact sheet for providers: https://www.fda.gov/media/997175/download     Fact sheet for patients: https://www.fda.gov/media/537260/download     Test performed by PCR.   RSV PCR - Normal   RESPIRATORY PANEL PCR W/ COVID-19 (SARS-COV-2), NP SWAB IN UTM/VTP, 2 HR TAT      Medications - No data to display  XR Chest 2 View     Result Date: 9/13/2024  Impression: Chronic findings in the lung bases. No acute chest findings. Electronically Signed: Genevieve Mitchell MD  9/13/2024 12:01 PM EDT  Workstation ID: [id number]                                     Medical Decision Making  Patient presented with above complaints, noted physical exam was completed she was placed on a cardiac monitor she was on 2 L nasal cannula with O2 sats at 95% this reports that this is her normal.  She has family members in the home that are sick with other viral illnesses.  She denies systemic infectious complaints, she has had no acute shortness of breath or chest pain.  A chest x-ray was obtained and was negative for acute abnormality on physical exam she has no wheezing or crackles.  COVID and influenza were negative as well as RSV, respiratory panel was sent and pending at time of discharge.  Shared decision-making with the patient she would like to be prescribed cough medicine as well as steroids, plans to follow-up with her pulmonologist as well as her PCP.  Discussed worrisome symptoms to monitor for advised to return as needed.  Patient gave verbal understanding and felt comfortable this plan of care. " " Denies further questions or complaints.     /73 (BP Location: Right arm, Patient Position: Sitting)   Pulse 98   Temp 98 °F (36.7 °C) (Temporal)   Resp 22   Ht 160 cm (63\")   Wt 93.1 kg (205 lb 4.8 oz)   SpO2 90%   BMI 36.37 kg/m²       Chart review: 6/3/2024 outpatient visit with PCP related to medical renal's exam     Past Medical History:  No date: COPD (chronic obstructive pulmonary disease)  No date: Sleep apnea      Medications: Medications - No data to display         Radiology interpretation:  X-rays reviewed by me and interpreted by radiologist,   XR Chest 2 View     Result Date: 9/13/2024  Impression: Chronic findings in the lung bases. No acute chest findings. Electronically Signed: Genevieve Mitchell MD  9/13/2024 12:01 PM EDT  Workstation ID: [id number]        Lab interpretation:  Labs viewed by me significant for, Labs Reviewed  COVID-19 AND FLU A/B, NP SWAB IN TRANSPORT MEDIA 1 HR TAT - Normal       Narrative: Fact sheet for providers: https://www.fda.gov/media/937905/download                                Fact sheet for patients: https://www.fda.gov/media/311647/download                                Test performed by PCR.  RSV PCR - Normal  RESPIRATORY PANEL PCR W/ COVID-19 (SARS-COV-2), NP SWAB IN UTM/VTP, 2 HR TAT       Note Disclaimer: At [facility], we believe that sharing information builds trust and better  relationships. You are receiving this note because you recently visited [facility]. It is possible you will see health information before a provider has talked with you about it. This kind of information can be easy to misunderstand. To help you fully understand what it means for your health, we urge you to discuss this note with your provider.      Part of this note may be an electronic transcription/translation of spoken language to printed text using the Dragon Dictation System.     Appropriate PPE worn during exam.        Amount and/or Complexity of Data " Reviewed  Labs: ordered.  Radiology: ordered.    Final diagnoses:   Viral syndrome   Chronic obstructive pulmonary disease, unspecified COPD type       ED Disposition  ED Disposition       ED Disposition   Discharge    Condition   Stable    Comment   --               Polly Domingo MD  [address] [place] [address] [phone]               Medication List        New Prescriptions      promethazine-dextromethorphan 6.25-15 MG/5ML syrup  Commonly known as: PROMETHAZINE-DM  Take 5 mL by mouth 4 (Four) Times a Day As Needed for Cough.               Where to Get Your Medications        These medications were sent to [facility] [address] [place] [address] [place] - [address] - [phone]  - [phone] FX  [address] [place] [address]      Phone: [phone]   methylPREDNISolone 4 MG dose pack  promethazine-dextromethorphan 6.25-15 MG/5ML syrup

## 2024-10-07 ENCOUNTER — HOSPITAL ENCOUNTER (OUTPATIENT)
Facility: HOSPITAL | Age: 69
Discharge: HOME | End: 2024-10-07
Attending: EMERGENCY MEDICINE | Admitting: EMERGENCY MEDICINE
Payer: MEDICARE

## 2024-10-07 ENCOUNTER — APPOINTMENT (OUTPATIENT)
Dept: GENERAL RADIOLOGY | Facility: HOSPITAL | Age: 69
End: 2024-10-07
Payer: MEDICARE

## 2024-10-07 VITALS
WEIGHT: 205.9 LBS | RESPIRATION RATE: 18 BRPM | TEMPERATURE: 97.7 F | DIASTOLIC BLOOD PRESSURE: 90 MMHG | SYSTOLIC BLOOD PRESSURE: 129 MMHG | OXYGEN SATURATION: 94 % | HEART RATE: 72 BPM | BODY MASS INDEX: 36.48 KG/M2 | HEIGHT: 63 IN

## 2024-10-07 DIAGNOSIS — J44.1 COPD WITH ACUTE EXACERBATION: Primary | ICD-10-CM

## 2024-10-07 LAB
FLUAV SUBTYP SPEC NAA+PROBE: NOT DETECTED
FLUBV RNA ISLT QL NAA+PROBE: NOT DETECTED
SARS-COV-2 RNA RESP QL NAA+PROBE: NOT DETECTED

## 2024-10-07 PROCEDURE — 71045 X-RAY EXAM CHEST 1 VIEW: CPT

## 2024-10-07 PROCEDURE — G0463 HOSPITAL OUTPT CLINIC VISIT: HCPCS | Performed by: EMERGENCY MEDICINE

## 2024-10-07 PROCEDURE — 63710000001 PREDNISONE PER 1 MG: Performed by: EMERGENCY MEDICINE

## 2024-10-07 PROCEDURE — 87636 SARSCOV2 & INF A&B AMP PRB: CPT | Performed by: EMERGENCY MEDICINE

## 2024-10-07 RX ORDER — PREDNISONE 50 MG/1
50 TABLET ORAL DAILY
Qty: 5 TABLET | Refills: 0 | Status: SHIPPED | OUTPATIENT
Start: 2024-10-07 | End: 2024-10-12

## 2024-10-07 RX ORDER — IPRATROPIUM BROMIDE AND ALBUTEROL SULFATE 2.5; .5 MG/3ML; MG/3ML
3 SOLUTION RESPIRATORY (INHALATION) ONCE
Status: COMPLETED | OUTPATIENT
Start: 2024-10-07 | End: 2024-10-07

## 2024-10-07 RX ORDER — PREDNISONE 20 MG/1
50 TABLET ORAL ONCE
Status: COMPLETED | OUTPATIENT
Start: 2024-10-07 | End: 2024-10-07

## 2024-10-07 RX ORDER — BENZONATATE 100 MG/1
100 CAPSULE ORAL 3 TIMES DAILY PRN
Qty: 20 CAPSULE | Refills: 0 | Status: SHIPPED | OUTPATIENT
Start: 2024-10-07 | End: 2024-10-14

## 2024-10-07 RX ORDER — DOXYCYCLINE 100 MG/1
100 CAPSULE ORAL 2 TIMES DAILY
Qty: 14 CAPSULE | Refills: 0 | Status: SHIPPED | OUTPATIENT
Start: 2024-10-07 | End: 2024-10-14

## 2024-10-07 RX ADMIN — PREDNISONE 50 MG: 20 TABLET ORAL at 09:47

## 2024-10-07 RX ADMIN — IPRATROPIUM BROMIDE AND ALBUTEROL SULFATE 3 ML: .5; 3 SOLUTION RESPIRATORY (INHALATION) at 09:49

## 2024-10-07 NOTE — FSED PROVIDER NOTE
Subjective   History of Present Illness  Patient is a 69-year-old female with history of emphysema on 2 and half liters of oxygen at home 24 hours a day presents with worsening cough and wheezing at home.  Patient was here 2 weeks ago got a course of steroids and some cough syrup and did some better for a while but over the course of the week has worsened again.  Maybe some mild subjective fevers.  No significant increased work of breathing.  No vomiting or diarrhea.  No chest pain.  Patient noted to be frequently coughing in the emergency department    History provided by:  Patient      Review of Systems    Past Medical History:   Diagnosis Date    COPD (chronic obstructive pulmonary disease)     Sleep apnea        Allergies   Allergen Reactions    Sulfa Antibiotics Anaphylaxis       Past Surgical History:   Procedure Laterality Date    TONSILLECTOMY      TUBAL ABDOMINAL LIGATION         History reviewed. No pertinent family history.    Social History     Socioeconomic History    Marital status:    Tobacco Use    Smoking status: Former    Smokeless tobacco: Never    Tobacco comments:     quit 2011   Vaping Use    Vaping status: Never Used   Substance and Sexual Activity    Alcohol use: Never    Drug use: Never           Objective   Physical Exam  Vitals and nursing note reviewed.   Constitutional:       General: She is not in acute distress.     Appearance: Normal appearance.   HENT:      Head: Normocephalic and atraumatic.      Nose: Nose normal.      Mouth/Throat:      Mouth: Mucous membranes are moist.   Cardiovascular:      Rate and Rhythm: Normal rate and regular rhythm.      Heart sounds: Normal heart sounds.   Pulmonary:      Effort: Pulmonary effort is normal.      Comments: Scattered coarse wheezes throughout  Abdominal:      General: Abdomen is flat.      Palpations: Abdomen is soft.   Musculoskeletal:         General: No swelling, tenderness or deformity. Normal range of motion.   Skin:      General: Skin is warm and dry.   Neurological:      General: No focal deficit present.      Mental Status: She is alert and oriented to person, place, and time.   Psychiatric:         Mood and Affect: Mood normal.         Procedures           ED Course  ED Course as of 10/07/24 1108   Mon Oct 07, 2024   1107 His vital signs are good and she is saturating adequately on her home oxygen.  Given the fact that she has been sick for 2 weeks and only transiently improved before worsening again, will add doxycycline coverage for atypical bacteria.  Her chest x-ray is unremarkable.  She will use her nebulizer at home as needed and return for increased work of breathing or any other emergency.  If not significantly improving will follow-up closely with her pulmonologist. [SS]      ED Course User Index  [SS] Sample, Vijay ROMANO MD                                           Medical Decision Making  Problems Addressed:  COPD with acute exacerbation: complicated acute illness or injury    Amount and/or Complexity of Data Reviewed  Radiology: ordered.    Risk  Prescription drug management.        Final diagnoses:   COPD with acute exacerbation       ED Disposition  ED Disposition       ED Disposition   Discharge    Condition   Stable    Comment   --               Polly Domingo MD  [address] [facility] [address] [phone]    Schedule an appointment as soon as possible for a visit on 10/11/2024  As needed    [address] [address] [phone]    If symptoms worsen         Medication List        New Prescriptions      benzonatate 100 MG capsule  Commonly known as: TESSALON  Take 1 capsule by mouth 3 (Three) Times a Day As Needed for Cough for up to 7 days.     doxycycline 100 MG capsule  Commonly known as: MONODOX  Take 1 capsule by mouth 2 (Two) Times a Day for 7 days.     predniSONE 50 MG tablet  Commonly known as: DELTASONE  Take 1 tablet by  mouth Daily for 5 days.               Where to Get Your Medications        These medications were sent to [facility] [address] [place] [address], [place] - [address] - [phone]  - [phone] FX  [address] [place] [address]      Phone: [phone]   benzonatate 100 MG capsule  doxycycline 100 MG capsule  predniSONE 50 MG tablet

## 2024-10-07 NOTE — DISCHARGE INSTRUCTIONS
Fully there is no pneumonia in your chest, however, due to the length of your sickness, will try 1 more course of steroids but add an antibiotic in case of any atypical bacterial infection.  Use your albuterol at home every 3-4 hours as needed.  Return for severe worsening difficulty breathing or any other emergency.

## 2025-05-07 ENCOUNTER — HOSPITAL ENCOUNTER (EMERGENCY)
Facility: HOSPITAL | Age: 70
Discharge: HOME | End: 2025-05-07
Attending: EMERGENCY MEDICINE
Payer: MEDICAID

## 2025-05-07 ENCOUNTER — APPOINTMENT (OUTPATIENT)
Dept: GENERAL RADIOLOGY | Facility: HOSPITAL | Age: 70
End: 2025-05-07
Payer: MEDICAID

## 2025-05-07 VITALS
BODY MASS INDEX: 38.75 KG/M2 | OXYGEN SATURATION: 95 % | DIASTOLIC BLOOD PRESSURE: 71 MMHG | WEIGHT: 218.7 LBS | TEMPERATURE: 99.1 F | HEART RATE: 105 BPM | RESPIRATION RATE: 18 BRPM | HEIGHT: 63 IN | SYSTOLIC BLOOD PRESSURE: 132 MMHG

## 2025-05-07 DIAGNOSIS — B97.89 VIRAL RESPIRATORY INFECTION: Primary | ICD-10-CM

## 2025-05-07 DIAGNOSIS — J98.8 VIRAL RESPIRATORY INFECTION: Primary | ICD-10-CM

## 2025-05-07 LAB
ALBUMIN SERPL-MCNC: 4 G/DL (ref 3.5–5.2)
ALBUMIN/GLOB SERPL: 1.5 G/DL
ALP SERPL-CCNC: 80 U/L (ref 39–117)
ALT SERPL W P-5'-P-CCNC: 13 U/L (ref 1–33)
ANION GAP SERPL CALCULATED.3IONS-SCNC: 10.1 MMOL/L (ref 5–15)
AST SERPL-CCNC: 20 U/L (ref 1–32)
BASOPHILS # BLD AUTO: 0.03 10*3/MM3 (ref 0–0.2)
BASOPHILS NFR BLD AUTO: 0.6 % (ref 0–1.5)
BILIRUB SERPL-MCNC: 0.3 MG/DL (ref 0–1.2)
BUN SERPL-MCNC: 6 MG/DL (ref 8–23)
BUN/CREAT SERPL: 9.1 (ref 7–25)
CALCIUM SPEC-SCNC: 8.6 MG/DL (ref 8.6–10.5)
CHLORIDE SERPL-SCNC: 105 MMOL/L (ref 98–107)
CO2 SERPL-SCNC: 24.9 MMOL/L (ref 22–29)
CREAT SERPL-MCNC: 0.66 MG/DL (ref 0.57–1)
DEPRECATED RDW RBC AUTO: 51.9 FL (ref 37–54)
EGFRCR SERPLBLD CKD-EPI 2021: 95.7 ML/MIN/1.73
EOSINOPHIL # BLD AUTO: 0.03 10*3/MM3 (ref 0–0.4)
EOSINOPHIL NFR BLD AUTO: 0.6 % (ref 0.3–6.2)
ERYTHROCYTE [DISTWIDTH] IN BLOOD BY AUTOMATED COUNT: 13.6 % (ref 12.3–15.4)
FLUAV SUBTYP SPEC NAA+PROBE: NOT DETECTED
FLUBV RNA ISLT QL NAA+PROBE: NOT DETECTED
GLOBULIN UR ELPH-MCNC: 2.7 GM/DL
GLUCOSE SERPL-MCNC: 101 MG/DL (ref 65–99)
HCT VFR BLD AUTO: 40.3 % (ref 34–46.6)
HGB BLD-MCNC: 12.8 G/DL (ref 12–15.9)
IMM GRANULOCYTES # BLD AUTO: 0.01 10*3/MM3 (ref 0–0.05)
IMM GRANULOCYTES NFR BLD AUTO: 0.2 % (ref 0–0.5)
LYMPHOCYTES # BLD AUTO: 0.65 10*3/MM3 (ref 0.7–3.1)
LYMPHOCYTES NFR BLD AUTO: 12.2 % (ref 19.6–45.3)
MCH RBC QN AUTO: 32.2 PG (ref 26.6–33)
MCHC RBC AUTO-ENTMCNC: 31.8 G/DL (ref 31.5–35.7)
MCV RBC AUTO: 101.5 FL (ref 79–97)
MONOCYTES # BLD AUTO: 0.87 10*3/MM3 (ref 0.1–0.9)
MONOCYTES NFR BLD AUTO: 16.3 % (ref 5–12)
NEUTROPHILS NFR BLD AUTO: 3.75 10*3/MM3 (ref 1.7–7)
NEUTROPHILS NFR BLD AUTO: 70.1 % (ref 42.7–76)
PLATELET # BLD AUTO: 177 10*3/MM3 (ref 140–450)
PMV BLD AUTO: 10.7 FL (ref 6–12)
POTASSIUM SERPL-SCNC: 3.7 MMOL/L (ref 3.5–5.2)
PROT SERPL-MCNC: 6.7 G/DL (ref 6–8.5)
QT INTERVAL: 359 MS
QTC INTERVAL: 432 MS
RBC # BLD AUTO: 3.97 10*6/MM3 (ref 3.77–5.28)
SARS-COV-2 RNA RESP QL NAA+PROBE: NOT DETECTED
SODIUM SERPL-SCNC: 140 MMOL/L (ref 136–145)
STREP A PCR: NOT DETECTED
WBC NRBC COR # BLD AUTO: 5.34 10*3/MM3 (ref 3.4–10.8)

## 2025-05-07 PROCEDURE — 80053 COMPREHEN METABOLIC PANEL: CPT | Performed by: EMERGENCY MEDICINE

## 2025-05-07 PROCEDURE — 99284 EMERGENCY DEPT VISIT MOD MDM: CPT | Performed by: EMERGENCY MEDICINE

## 2025-05-07 PROCEDURE — 93005 ELECTROCARDIOGRAM TRACING: CPT | Performed by: EMERGENCY MEDICINE

## 2025-05-07 PROCEDURE — 85025 COMPLETE CBC W/AUTO DIFF WBC: CPT | Performed by: EMERGENCY MEDICINE

## 2025-05-07 PROCEDURE — 87636 SARSCOV2 & INF A&B AMP PRB: CPT | Performed by: EMERGENCY MEDICINE

## 2025-05-07 PROCEDURE — 99284 EMERGENCY DEPT VISIT MOD MDM: CPT

## 2025-05-07 PROCEDURE — 87651 STREP A DNA AMP PROBE: CPT | Performed by: EMERGENCY MEDICINE

## 2025-05-07 PROCEDURE — 71046 X-RAY EXAM CHEST 2 VIEWS: CPT

## 2025-05-07 PROCEDURE — 93010 ELECTROCARDIOGRAM REPORT: CPT | Performed by: EMERGENCY MEDICINE

## 2025-05-07 RX ORDER — DOXYCYCLINE 100 MG/1
100 CAPSULE ORAL 2 TIMES DAILY
Qty: 14 CAPSULE | Refills: 0 | Status: SHIPPED | OUTPATIENT
Start: 2025-05-07 | End: 2025-05-14

## 2025-05-07 RX ORDER — SODIUM CHLORIDE 0.9 % (FLUSH) 0.9 %
10 SYRINGE (ML) INJECTION AS NEEDED
Status: DISCONTINUED | OUTPATIENT
Start: 2025-05-07 | End: 2025-05-07 | Stop reason: HOSPADM

## 2025-05-07 NOTE — ED NOTES
Pt on 3L NC and tachypneic at this time. MD at bedside for assessment. O2 sat 94-95%. At baseline pt is on 2.5L NC at home.

## 2025-05-07 NOTE — FSED PROVIDER NOTE
Subjective   History of Present Illness  69-year-old female with a history of COPD who is on 2.5 L nasal cannula oxygen at home presents complaining of head congestion nasal congestion and shortness of breath and cough.  She denies feeling dyspneic as if she does not get enough oxygen but rather explains that her shortness of breath is because of all of the congestion that she is having she cannot breathe through her nose like normal.  She does have a bit of change from her baseline cough says it is harder and productive of a little bit of sputum but in her opinion the sputum is related to postnasal drip.  No chest pain no fevers no chills no nausea or vomiting.  Review of Systems  As noted in HPI  Past Medical History:   Diagnosis Date    COPD (chronic obstructive pulmonary disease)     Sleep apnea        Allergies   Allergen Reactions    Sulfa Antibiotics Anaphylaxis       Past Surgical History:   Procedure Laterality Date    TONSILLECTOMY      TUBAL ABDOMINAL LIGATION         No family history on file.    Social History     Socioeconomic History    Marital status:    Tobacco Use    Smoking status: Former    Smokeless tobacco: Never    Tobacco comments:     quit 2011   Vaping Use    Vaping status: Never Used   Substance and Sexual Activity    Alcohol use: Never    Drug use: Never           Objective   Physical Exam    INITIAL VITAL SIGNS: Reviewed by me.  Pulse ox normal  GENERAL: Alert. Well developed and well nourished. No respiratory distress.  HEAD: Normocephalic.   EYES: No conjunctival injection.  ENT: Oral mucosa is moist.  NECK/BACK: Supple. Full range of motion.  RESPIRATORY: Non-labored respirations.  Good air movement in all lung fields no wheezing rales or rhonchi  EXTREMITIES: No deformity.  SKIN: Warm and dry. No rashes. No diaphoresis.  NEUROLOGIC: Alert. Normal gait    Procedures     Nonischemic EKG, CBC without elevation in white count or neutrophils, reassuring metabolic panel COVID flu is  negative      ED Course  ED Course as of 05/07/25 1443   Wed May 07, 2025   1441 H&P as above, 69-year-old female with history of COPD complains of head nasal congestion.  She has some cough as well.  She is not dyspneic but she explains that she cannot breathe through her nose like normal.  She says her oxygen at home has a humidifier in it which helps.  Workup shows strep swabs negative chest x-ray without evidence for pneumonia.  Though she thinks the sputum she is producing is related to postnasal drip it does represent a change in sputum and with her cough having a different characteristic than normal will put her on some antibiotics as she does have COPD.  She is resting comfortably on her basal rate of oxygen even though she is slightly low when she got here but given her chronic hypoxia needing oxygen 88% is not that bad but she is 93 to 94% here on oxygen.  Encouraged to use Afrin nasal spray to facilitate breathing through the nose and follow-up with her doctor. [RO]      ED Course User Index  [RO] Ki Joshua MD                                           Medical Decision Making  Problems Addressed:  Viral respiratory infection: acute illness or injury    Amount and/or Complexity of Data Reviewed  Labs: ordered. Decision-making details documented in ED Course.  Radiology: ordered. Decision-making details documented in ED Course.  ECG/medicine tests: ordered and independent interpretation performed. Decision-making details documented in ED Course.    Risk  Prescription drug management.        Final diagnoses:   Viral respiratory infection       ED Disposition  ED Disposition       ED Disposition   Discharge    Condition   Good    Comment   --               Polly Domingo MD  [address] [id number] [phone]    Call   To schedule follow-up appointment         Medication List        New Prescriptions      doxycycline 100 MG capsule  Commonly known as: MONODOX  Take 1 capsule by  mouth 2 (Two) Times a Day for 7 days.               Where to Get Your Medications        These medications were sent to [facility] [address] [place] [address] [place] - [address] - [phone]  - [phone] FX  [address] [place] [address]      Phone: [phone]   doxycycline 100 MG capsule